# Patient Record
Sex: MALE | Race: BLACK OR AFRICAN AMERICAN | NOT HISPANIC OR LATINO | Employment: OTHER | URBAN - METROPOLITAN AREA
[De-identification: names, ages, dates, MRNs, and addresses within clinical notes are randomized per-mention and may not be internally consistent; named-entity substitution may affect disease eponyms.]

---

## 2017-01-13 ENCOUNTER — OFFICE VISIT (OUTPATIENT)
Dept: OPHTHALMOLOGY | Facility: CLINIC | Age: 82
End: 2017-01-13
Payer: MEDICARE

## 2017-01-13 DIAGNOSIS — H57.03 SMALL PUPIL: ICD-10-CM

## 2017-01-13 DIAGNOSIS — Z96.1 PSEUDOPHAKIA OF BOTH EYES: ICD-10-CM

## 2017-01-13 DIAGNOSIS — Z48.810 POSTOPERATIVE CARE FOR CATARACT: Primary | ICD-10-CM

## 2017-01-13 DIAGNOSIS — H40.023 OPEN ANGLE WITH BORDERLINE FINDINGS AND HIGH GLAUCOMA RISK IN OU: ICD-10-CM

## 2017-01-13 DIAGNOSIS — H52.31 ANISOMETROPIA: ICD-10-CM

## 2017-01-13 DIAGNOSIS — Z98.49 POSTOPERATIVE CARE FOR CATARACT: Primary | ICD-10-CM

## 2017-01-13 PROCEDURE — 99024 POSTOP FOLLOW-UP VISIT: CPT | Mod: S$GLB,,, | Performed by: OPHTHALMOLOGY

## 2017-01-13 PROCEDURE — 99999 PR PBB SHADOW E&M-EST. PATIENT-LVL II: CPT | Mod: PBBFAC,,, | Performed by: OPHTHALMOLOGY

## 2017-01-13 NOTE — PROGRESS NOTES
HPI     Post-op Evaluation    Additional comments: Pt states left eye is doing okay           Comments   S/p Complex Phaco IOL OS 11/3016    MEDS:  PA BID OS       Last edited by Paty Thomas MA on 1/13/2017  2:31 PM. (History)            Assessment /Plan     For exam results, see Encounter Report.    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 IFIS    PC IOL - od    Remote    Not sure who did it  or where it was done - if at [facility] it was pre- 2004    Ended up a bit hyperopic still - about +1.00   UNKNOWN  TYPE AND POWER OF IOL     Hyperopia / presbyopia     PLAN      S/P Complex phaco / IOL os - trypan blue and Dayan ring  - 11/30/2016    Aim for plano (other eye is a +1.00 and has PC IOL already )    PCB00 23.0    POM 1  phaco/IOL   Doing well - with mild corneal edema and mild increase in IOP   Begin Pred Acetate - decrease to q day till gone     F/U 4 weeks, AR/MR ou -  ++ anisometropia - OD is slightly hyperopic post CE yrs ago // OS is slightly myopic now   -still has a vicryl suture in corneal incision   - ?? May need slab off - old pt of MACARIO Hartley // has also seen Johnathan once     I have seen and personally examined the patient.  I agree with the findings, assessment and plan of the resident and/or fellow.     Annamarie Darling MD         grand daughter - Kathy Talavera - phone [phone] -

## 2017-01-17 ENCOUNTER — TELEPHONE (OUTPATIENT)
Dept: INTERNAL MEDICINE | Facility: CLINIC | Age: 82
End: 2017-01-17

## 2017-03-01 ENCOUNTER — HOSPITAL ENCOUNTER (EMERGENCY)
Facility: OTHER | Age: 82
Discharge: HOME | End: 2017-03-01
Attending: EMERGENCY MEDICINE
Payer: MEDICARE

## 2017-03-01 VITALS
HEART RATE: 78 BPM | DIASTOLIC BLOOD PRESSURE: 69 MMHG | TEMPERATURE: 98 F | SYSTOLIC BLOOD PRESSURE: 134 MMHG | OXYGEN SATURATION: 98 % | HEIGHT: 69 IN | RESPIRATION RATE: 18 BRPM | BODY MASS INDEX: 23.7 KG/M2 | WEIGHT: 160 LBS

## 2017-03-01 DIAGNOSIS — K59.00 CONSTIPATION, UNSPECIFIED CONSTIPATION TYPE: ICD-10-CM

## 2017-03-01 DIAGNOSIS — R33.9 URINARY RETENTION: Primary | ICD-10-CM

## 2017-03-01 LAB
BILIRUB UR QL STRIP: NEGATIVE
CLARITY UR: CLEAR
COLOR UR: YELLOW
GLUCOSE UR QL STRIP: NEGATIVE
HGB UR QL STRIP: ABNORMAL
KETONES UR QL STRIP: NEGATIVE
LEUKOCYTE ESTERASE UR QL STRIP: NEGATIVE
NITRITE UR QL STRIP: NEGATIVE
PH UR STRIP: 6 [PH] (ref 5–8)
PROT UR QL STRIP: NEGATIVE
SP GR UR STRIP: 1.01 (ref 1–1.03)
URN SPEC COLLECT METH UR: ABNORMAL
UROBILINOGEN UR STRIP-ACNC: NEGATIVE EU/DL

## 2017-03-01 PROCEDURE — 99283 EMERGENCY DEPT VISIT LOW MDM: CPT

## 2017-03-01 PROCEDURE — 81003 URINALYSIS AUTO W/O SCOPE: CPT

## 2017-03-01 RX ORDER — DOCUSATE SODIUM 100 MG/1
100 CAPSULE, LIQUID FILLED ORAL 2 TIMES DAILY
Qty: 60 CAPSULE | Refills: 0 | Status: SHIPPED | OUTPATIENT
Start: 2017-03-01 | End: 2019-04-03

## 2017-03-01 NOTE — ED PROVIDER NOTES
"Encounter Date: 3/1/2017       History     Chief Complaint   Patient presents with    Urinary Retention     Pt CO incomplete emptying of bladder, and unable to have BM Xs 2 days.     Review of patient's allergies indicates:  No Known Allergies  HPI Comments: Patient is 85 year old male with history of HTN, HLD, BPH, arthritis who presents with complaints of incomplete bladder emptying and constipation for the past 4 days. He has had trouble with these symptoms in the past. He is taking BPH medications but not taking stool softeners regularly. There is report of chronic left leg neuropathy without any changes. There is no report of trauma or injury or back pain. There is no report of bright red blood per rectum. There is no history of GI bleeding. Patient has never been catheterized in the past. He reports his last episode of urination was "sometime" this morning (exact time unable to be determined) He is currently accompanied by his granddaughter who is at bedside.     The history is provided by the patient.     Past Medical History:   Diagnosis Date    Amblyopia     refractive amblyopia left eye ()    Arthritis     BPH (benign prostatic hypertrophy)     Colon polyp     Hyperlipidemia     Hypertension      Past Surgical History:   Procedure Laterality Date    CATARACT EXTRACTION W/  INTRAOCULAR LENS IMPLANT Right n/a    done at [facility] before 2004    CATARACT EXTRACTION W/  INTRAOCULAR LENS IMPLANT Left 110/30/16    OS     TRANSURETHRAL RESECTION OF PROSTATE       Family History   Problem Relation Age of Onset    Diabetes Father     Diabetes Paternal Aunt     Cancer Brother     Heart disease Neg Hx     Amblyopia Neg Hx     Blindness Neg Hx     Cataracts Neg Hx     Glaucoma Neg Hx     Hypertension Neg Hx     Macular degeneration Neg Hx     Retinal detachment Neg Hx     Strabismus Neg Hx     Stroke Neg Hx     Thyroid disease Neg Hx      Social History   Substance Use Topics "    Smoking status: Former Smoker     Quit date: 9/26/1987    Smokeless tobacco: Never Used    Alcohol use No     Review of Systems   Constitutional: Negative for chills and fever.   HENT: Negative for sore throat and trouble swallowing.    Eyes: Negative for visual disturbance.   Respiratory: Negative for cough and shortness of breath.    Cardiovascular: Negative for chest pain.   Gastrointestinal: Positive for constipation. Negative for abdominal pain, diarrhea, nausea and vomiting.   Genitourinary: Negative for dysuria and flank pain.        Incomplete bladder emptying     Musculoskeletal: Negative for back pain, neck pain and neck stiffness.   Skin: Negative for rash.   Neurological: Negative for dizziness, syncope, weakness and headaches.   Psychiatric/Behavioral: Negative for confusion.       Physical Exam   Initial Vitals   BP Pulse Resp Temp SpO2   03/01/17 0925 03/01/17 0925 03/01/17 0925 03/01/17 0925 03/01/17 0925   161/83 83 18 98.1 °F (36.7 °C) 98 %     Physical Exam    Nursing note and vitals reviewed.  Constitutional: He appears well-developed and well-nourished. He is not diaphoretic. No distress.   Healthy appearing elderly AAM in NAD or apparent pain. He makes good eye contact and ambulates with ease.    HENT:   Head: Normocephalic and atraumatic.   Eyes: Conjunctivae and EOM are normal. Pupils are equal, round, and reactive to light. Right eye exhibits no discharge. Left eye exhibits no discharge. No scleral icterus.   Neck: Normal range of motion. Neck supple.   Cardiovascular: Normal rate, regular rhythm and normal heart sounds. Exam reveals no gallop and no friction rub.    No murmur heard.  Pulmonary/Chest: Breath sounds normal. He has no wheezes. He has no rhonchi. He has no rales.   Abdominal: Soft. Bowel sounds are normal. There is no tenderness. There is no rebound and no guarding.   Very mild suprapubic TTP without distention or overlying skin changes  No CVA TTP     Genitourinary:    Genitourinary Comments: guaiac negative stool with soft brown stool in vault, normal rectal tone no masses or bright red blood per rectum.    Musculoskeletal: Normal range of motion. He exhibits no edema or tenderness.   Lymphadenopathy:     He has no cervical adenopathy.   Neurological: He is alert and oriented to person, place, and time. He has normal strength. He displays normal reflexes. No cranial nerve deficit or sensory deficit.   Skin: Skin is warm and dry. No rash and no abscess noted. No erythema.   Psychiatric: He has a normal mood and affect. His behavior is normal. Thought content normal.         ED Course   Procedures  Labs Reviewed - No data to display     Labs Reviewed   URINALYSIS - Abnormal; Notable for the following:        Result Value    Occult Blood UA Trace (*)     All other components within normal limits          Medical Decision Making:   ED Management:  Urgent evaluation of 85 year old male who presents with complaints of constipation and sensation of incomplete bladder emptying. Patient is afebrile, non-toxic appearing and hemodynamically stable. Physical exam reveals benign abdomen, no lumbar midline bony TTP, normal cardiopulmomary auscultation and no focal neuro deficits. Patient has gauiac negative stool and has bowel movement while in the ED. He is also able to urinate without difficulty. No catheter warranted. UA reveals no evidence of UTI. Suspect symptoms are related to constipation and BPH. Granddaughter does admit that patient may not be completely compliant with BPH meds and stool softener. This is likely contributing to today's symptoms. Will discharge with instruction to follow-up with urology in 1-2 days for symptom re-check. He is amendable. Case discussed with attending who agrees with plan.       Patient able to urinate 400ccin the ED no alexis warranted. Will discharge with instruction to follow-up with Eliazar (uriologist) in 1-2 days for symptom re-check.                     ED Course     Clinical Impression:   The primary encounter diagnosis was Urinary retention. A diagnosis of Constipation, unspecified constipation type was also pertinent to this visit.          Ciara Rowe PA-C  03/01/17 7503

## 2017-03-01 NOTE — ED AVS SNAPSHOT
[facility]  [address]  [facility] [address]               Kevyn Leach   3/1/2017  9:27 AM   ED    Description:  Male : 1931   Department:  [facility]           Your Care was Coordinated By:     Provider Role From To    Steve Garduno II, MD Attending Provider 17 0935 --    Ciara Rowe PA-C Physician Assistant 17 0935 --      Reason for Visit     Urinary Retention           Diagnoses this Visit        Comments    Urinary retention    -  Primary     Constipation, unspecified constipation type           ED Disposition     None           To Do List           Follow-up Information     Follow up with Satnam Perea Jr, MD In 2 days.    Specialty:  Urology    Why:  For symptom re-check.     Contact information:    [address]  [facility] [address]  [phone]         These Medications        Disp Refills Start End    docusate sodium (COLACE) 100 MG capsule 60 capsule 0 3/1/2017     Take 1 capsule (100 mg total) by mouth 2 (two) times daily. - Oral    Pharmacy: [facility] [address] [place] [address] (N), [place] - [address] Ph #: [phone]         [facility]     [facility] Care [facility] -  Assistance  Registered nurses in the [facility] provide clinical advisement, health education, appointment booking, and other advisory services.  Call for this free service at [phone].             Medications           Message regarding Medications     Verify the changes and/or additions to your medication regime listed below are the same as discussed with your clinician today.  If any of these changes or additions are incorrect, please notify your healthcare provider.        START taking these NEW medications        Refills    docusate sodium (COLACE) 100 MG capsule 0    Sig: Take 1 capsule (100 mg total) by mouth 2 (two) times daily.    Class: Print    Route: Oral           Verify  that the below list of medications is an accurate representation of the medications you are currently taking.  If none reported, the list may be blank. If incorrect, please contact your healthcare provider. Carry this list with you in case of emergency.           Current Medications     docusate sodium (COLACE) 100 MG capsule Take 1 capsule (100 mg total) by mouth 2 (two) times daily.    finasteride (PROSCAR) 5 mg tablet Take 1 tablet (5 mg total) by mouth once daily.    hydrocortisone 2.5 % cream Apply topically 2 (two) times daily.    lovastatin (MEVACOR) 20 MG tablet TAKE ONE TABLET BY MOUTH ONCE DAILY    meloxicam (MOBIC) 15 MG tablet Take 1 tablet (15 mg total) by mouth once daily.    prednisoLONE acetate (PRED FORTE) 1 % DrpS Place 1 drop into the left eye 2 (two) times daily.     tamsulosin (FLOMAX) 0.4 mg Cp24 Take 1 capsule (0.4 mg total) by mouth once daily.    triamterene-hydrochlorothiazide 37.5-25 mg (DYAZIDE) 37.5-25 mg per capsule Take 1 capsule by mouth once daily.    verapamil (CALAN-SR) 240 MG CR tablet Take 1 tablet (240 mg total) by mouth once daily.           Clinical Reference Information           Your Vitals Were     BP                   134/69           Allergies as of 3/1/2017     No Known Allergies      Immunizations Administered on Date of Encounter - 3/1/2017     None      ED Micro, Lab, POCT     Start Ordered       Status Ordering Provider    03/01/17 0955 03/01/17 0954  Urinalysis  STAT      Final result       ED Imaging Orders     None        Discharge Instructions         Treating Constipation    Constipation is a common and often uncomfortable problem. Constipation means you have bowel movements fewer than 3 times per week, or strain to pass hard, dry stool. It can last a short time. Or it can be a problem that never seems to go away. The good news is that it can often be treated and controlled.  Eat more fiber  One of the best ways to help treat constipation is to increase your  fiber intake. You can do this either through diet or by using fiber supplements. Fiber (in whole grains, fruits, and vegetables) adds bulk and absorbs water to soften the stool. This helps the stool pass through the colon more easily. When you increase your fiber intake, do it slowly to avoid side effects such as bloating. Also increase the amount of water that you drink. Eating more of the following foods can add fiber to your diet.  · High-fiber cereals  · Whole grains, bran, and brown rice  · Vegetables such as carrots, broccoli, and greens  · Fresh fruits (especially apples, pears, and dried fruits like raisins and apricots)  · Nuts and legumes (especially beans such as lentils, kidney beans, and lima beans)  Get physically active  Exercise helps improve the working of your colon which helps ease constipation. Try to get some physical activity every day. If you havent been active for a while, talk to your healthcare provider before starting again.  Laxatives  Your healthcare provider may suggest an over-the-counter product to help ease your constipation. He or she may suggest the use of bulk-forming agents or laxatives. The use of laxatives, if used as directed, is common and safe. Follow directions carefully when using them. See your healthcare provider for new-onset constipation, or long-term constipation, to rule out other causes such as medicines or thyroid disease.  Date Last Reviewed: 7/1/2016  © 7536-7603 [employer]. [address] [place] [address], [place] [address]. All rights reserved. This information is not intended as a substitute for professional medical care. Always follow your healthcare professional's instructions.          Constipation (Adult)  Constipation means that you have bowel movements that are less frequent than usual. Stools often become very hard and difficult to pass.  Constipation is very common. At some point in life it affects almost everyone. Since everyone's bowel habits  are different, what is constipation to one person may not be to another. Your healthcare provider may do tests to diagnose constipation. It depends on what he or she finds when evaluating you.    Symptoms of constipation include:  · Abdominal pain  · Bloating  · Vomiting  · Painful bowel movements  · Itching, swelling, bleeding, or pain around the anus  Causes  Constipation can have many causes. These include:  · Diet low in fiber  · Too much dairy  · Not drinking enough liquids  · Lack of exercise or physical activity. This is especially true for older adults.  · Changes in lifestyle or daily routine, including pregnancy, aging, work, and travel  · Frequent use or misuse of laxatives  · Ignoring the urge to have a bowel movement or delaying it until later  · Medicines, such as certain prescription pain medicines, iron supplements, antacids, certain antidepressants, and calcium supplements  · Diseases like irritable bowel syndrome, bowel obstructions, stroke, diabetes, thyroid disease, Parkinson disease, hemorrhoids, and colon cancer  Complications  Potential complications of constipation can include:  · Hemorrhoids  · Rectal bleeding from hemorrhoids or anal fissures (skin tears)  · Hernias  · Dependency on laxatives  · Chronic constipation  · Fecal impaction  · Bowel obstruction or perforation  Home care  All treatment should be done after talking with your healthcare provider. This is especially true if you have another medical problems, are taking prescription medicines, or are an older adult. Treatment most often involves lifestyle changes. You may also need medicines. Your healthcare provider will tell you which will work best for you. Follow the advice below to help avoid this problem in the future.  Lifestyle changes  These lifestyle changes can help prevent constipation:  · Diet. Eat a high-fiber diet, with fresh fruit and vegetables, and reduce dairy intake, meats, and processed foods  · Fluids. It's  important to get enough fluids each day. Drink plenty of water when you eat more fiber. If you are on diet that limits the amount of fluid you can have, talk about this with your healthcare provider.  · Regular exercise. Check with your healthcare provider first.  Medications  Take any medicines as directed. Some laxatives are safe to use only every now and then. Others can be taken on a regular basis. Talk with your doctor or pharmacist if you have questions.  Prescription pain medicines can cause constipation. If you are taking this kind of medicine, ask your healthcare provider if you should also take a stool softener.  Medicines you may take to treat constipation include:  · Fiber supplements  · Stool softeners  · Laxatives  · Enemas  · Rectal suppositories  Follow-up care  Follow up with your healthcare provider if symptoms don't get better in the next few days. You may need to have more tests or see a specialist.  Call 911  Call 911 if any of these occur:  · Trouble breathing  · Stiff, rigid abdomen that is severely painful to touch  · Confusion  · Fainting or loss of consciousness  · Rapid heart rate  · Chest pain  When to seek medical advice  Call your healthcare provider right away if any of these occur:  · Fever over 100.4°F (38°C)  · Failure to resume normal bowel movements  · Pain in your abdomen or back gets worse  · Nausea or vomiting  · Swelling in your abdomen  · Blood in the stool  · Black, tarry stool  · Involuntary weight loss  · Weakness  Date Last Reviewed: 12/30/2015  © 4568-1319 [employer]. [address] [place] [address], [place] [address]. All rights reserved. This information is not intended as a substitute for professional medical care. Always follow your healthcare professional's instructions.          Discharge References/Attachments     BENIGN PROSTATIC HYPERPLASIA (ENGLISH)    URINARY RETENTION, MALE (ENGLISH)      Your Scheduled Appointments     Mar 02, 2017 11:00 AM CST   Post  OP with MD Sixto Kong - Ophthalmology ([facility] )    [address]  [facility] [address] [phone]              Smoking Cessation     If you would like to quit smoking:   You may be eligible for free services if you are a [place] resident and started smoking cigarettes before September 1, 1988.  Call the Smoking Cessation Trust (SCT) toll free at (351) 593-3491 or (647) 102-3087.   Call 1-800-QUIT-NOW if you do not meet the above criteria.             [facility] complies with applicable Federal civil rights laws and does not discriminate on the basis of race, color, national origin, age, disability, or sex.        Language Assistance Services     ATTENTION: Language assistance services are available, free of charge. Please call [phone].      ATENCIÓN: Si habla español, tiene a jerome disposición servicios gratuitos de asistencia lingüística. Llame al [phone].     CHÚ Ý: N?u b?n nói Ti?ng Vi?t, có các d?ch v? h? tr? ngôn ng? mi?n phí dành cho b?n. G?i s? [phone].

## 2017-03-01 NOTE — ED TRIAGE NOTES
Pt states difficulty emptying bladder - states difficulty starting stream and going in small amounts - pt also states no BM for the past 2 days - c/o abdominal pain

## 2017-03-01 NOTE — DISCHARGE INSTRUCTIONS
Treating Constipation    Constipation is a common and often uncomfortable problem. Constipation means you have bowel movements fewer than 3 times per week, or strain to pass hard, dry stool. It can last a short time. Or it can be a problem that never seems to go away. The good news is that it can often be treated and controlled.  Eat more fiber  One of the best ways to help treat constipation is to increase your fiber intake. You can do this either through diet or by using fiber supplements. Fiber (in whole grains, fruits, and vegetables) adds bulk and absorbs water to soften the stool. This helps the stool pass through the colon more easily. When you increase your fiber intake, do it slowly to avoid side effects such as bloating. Also increase the amount of water that you drink. Eating more of the following foods can add fiber to your diet.  · High-fiber cereals  · Whole grains, bran, and brown rice  · Vegetables such as carrots, broccoli, and greens  · Fresh fruits (especially apples, pears, and dried fruits like raisins and apricots)  · Nuts and legumes (especially beans such as lentils, kidney beans, and lima beans)  Get physically active  Exercise helps improve the working of your colon which helps ease constipation. Try to get some physical activity every day. If you havent been active for a while, talk to your healthcare provider before starting again.  Laxatives  Your healthcare provider may suggest an over-the-counter product to help ease your constipation. He or she may suggest the use of bulk-forming agents or laxatives. The use of laxatives, if used as directed, is common and safe. Follow directions carefully when using them. See your healthcare provider for new-onset constipation, or long-term constipation, to rule out other causes such as medicines or thyroid disease.  Date Last Reviewed: 7/1/2016  © 3483-4353 [employer]. [address] [place] [address], [place] [address]. All rights reserved.  This information is not intended as a substitute for professional medical care. Always follow your healthcare professional's instructions.          Constipation (Adult)  Constipation means that you have bowel movements that are less frequent than usual. Stools often become very hard and difficult to pass.  Constipation is very common. At some point in life it affects almost everyone. Since everyone's bowel habits are different, what is constipation to one person may not be to another. Your healthcare provider may do tests to diagnose constipation. It depends on what he or she finds when evaluating you.    Symptoms of constipation include:  · Abdominal pain  · Bloating  · Vomiting  · Painful bowel movements  · Itching, swelling, bleeding, or pain around the anus  Causes  Constipation can have many causes. These include:  · Diet low in fiber  · Too much dairy  · Not drinking enough liquids  · Lack of exercise or physical activity. This is especially true for older adults.  · Changes in lifestyle or daily routine, including pregnancy, aging, work, and travel  · Frequent use or misuse of laxatives  · Ignoring the urge to have a bowel movement or delaying it until later  · Medicines, such as certain prescription pain medicines, iron supplements, antacids, certain antidepressants, and calcium supplements  · Diseases like irritable bowel syndrome, bowel obstructions, stroke, diabetes, thyroid disease, Parkinson disease, hemorrhoids, and colon cancer  Complications  Potential complications of constipation can include:  · Hemorrhoids  · Rectal bleeding from hemorrhoids or anal fissures (skin tears)  · Hernias  · Dependency on laxatives  · Chronic constipation  · Fecal impaction  · Bowel obstruction or perforation  Home care  All treatment should be done after talking with your healthcare provider. This is especially true if you have another medical problems, are taking prescription medicines, or are an older adult. Treatment  most often involves lifestyle changes. You may also need medicines. Your healthcare provider will tell you which will work best for you. Follow the advice below to help avoid this problem in the future.  Lifestyle changes  These lifestyle changes can help prevent constipation:  · Diet. Eat a high-fiber diet, with fresh fruit and vegetables, and reduce dairy intake, meats, and processed foods  · Fluids. It's important to get enough fluids each day. Drink plenty of water when you eat more fiber. If you are on diet that limits the amount of fluid you can have, talk about this with your healthcare provider.  · Regular exercise. Check with your healthcare provider first.  Medications  Take any medicines as directed. Some laxatives are safe to use only every now and then. Others can be taken on a regular basis. Talk with your doctor or pharmacist if you have questions.  Prescription pain medicines can cause constipation. If you are taking this kind of medicine, ask your healthcare provider if you should also take a stool softener.  Medicines you may take to treat constipation include:  · Fiber supplements  · Stool softeners  · Laxatives  · Enemas  · Rectal suppositories  Follow-up care  Follow up with your healthcare provider if symptoms don't get better in the next few days. You may need to have more tests or see a specialist.  Call 911  Call 911 if any of these occur:  · Trouble breathing  · Stiff, rigid abdomen that is severely painful to touch  · Confusion  · Fainting or loss of consciousness  · Rapid heart rate  · Chest pain  When to seek medical advice  Call your healthcare provider right away if any of these occur:  · Fever over 100.4°F (38°C)  · Failure to resume normal bowel movements  · Pain in your abdomen or back gets worse  · Nausea or vomiting  · Swelling in your abdomen  · Blood in the stool  · Black, tarry stool  · Involuntary weight loss  · Weakness  Date Last Reviewed: 12/30/2015  © 6032-9070 [employer]. [address] [place] [address], [place] [address]. All rights reserved. This information is not intended as a substitute for professional medical care. Always follow your healthcare professional's instructions.

## 2017-03-15 DIAGNOSIS — E78.5 HYPERLIPIDEMIA: ICD-10-CM

## 2017-03-15 RX ORDER — LOVASTATIN 20 MG/1
TABLET ORAL
Qty: 30 TABLET | Refills: 0 | Status: SHIPPED | OUTPATIENT
Start: 2017-03-15 | End: 2017-04-13 | Stop reason: SDUPTHER

## 2017-03-21 RX ORDER — FINASTERIDE 5 MG/1
TABLET, FILM COATED ORAL
Qty: 90 TABLET | Refills: 3 | Status: SHIPPED | OUTPATIENT
Start: 2017-03-21 | End: 2017-10-31 | Stop reason: SDUPTHER

## 2017-04-13 DIAGNOSIS — E78.5 HYPERLIPIDEMIA: ICD-10-CM

## 2017-04-18 RX ORDER — LOVASTATIN 20 MG/1
TABLET ORAL
Qty: 30 TABLET | Refills: 12 | Status: SHIPPED | OUTPATIENT
Start: 2017-04-18 | End: 2018-04-30 | Stop reason: SDUPTHER

## 2017-04-24 DIAGNOSIS — N40.0 BENIGN NON-NODULAR PROSTATIC HYPERPLASIA WITHOUT LOWER URINARY TRACT SYMPTOMS: ICD-10-CM

## 2017-04-24 DIAGNOSIS — N40.0 BPH (BENIGN PROSTATIC HYPERTROPHY): ICD-10-CM

## 2017-04-25 RX ORDER — TAMSULOSIN HYDROCHLORIDE 0.4 MG/1
CAPSULE ORAL
Qty: 90 CAPSULE | Refills: 12 | Status: SHIPPED | OUTPATIENT
Start: 2017-04-25 | End: 2018-04-30 | Stop reason: SDUPTHER

## 2017-04-28 DIAGNOSIS — I10 ESSENTIAL HYPERTENSION: ICD-10-CM

## 2017-05-01 RX ORDER — VERAPAMIL HYDROCHLORIDE 240 MG/1
TABLET, FILM COATED, EXTENDED RELEASE ORAL
Qty: 90 TABLET | Refills: 12 | Status: SHIPPED | OUTPATIENT
Start: 2017-05-01 | End: 2018-07-26 | Stop reason: SDUPTHER

## 2017-05-18 DIAGNOSIS — I10 ESSENTIAL HYPERTENSION: ICD-10-CM

## 2017-05-18 RX ORDER — TRIAMTERENE AND HYDROCHLOROTHIAZIDE 37.5; 25 MG/1; MG/1
CAPSULE ORAL
Qty: 90 CAPSULE | Refills: 3 | Status: SHIPPED | OUTPATIENT
Start: 2017-05-18 | End: 2018-04-30 | Stop reason: SDUPTHER

## 2017-05-23 ENCOUNTER — HOSPITAL ENCOUNTER (EMERGENCY)
Facility: HOSPITAL | Age: 82
Discharge: HOME | End: 2017-05-23
Attending: FAMILY MEDICINE
Payer: MEDICARE

## 2017-05-23 ENCOUNTER — TELEPHONE (OUTPATIENT)
Dept: INTERNAL MEDICINE | Facility: CLINIC | Age: 82
End: 2017-05-23

## 2017-05-23 VITALS
WEIGHT: 169 LBS | OXYGEN SATURATION: 100 % | HEIGHT: 69 IN | HEART RATE: 77 BPM | RESPIRATION RATE: 16 BRPM | TEMPERATURE: 99 F | DIASTOLIC BLOOD PRESSURE: 88 MMHG | SYSTOLIC BLOOD PRESSURE: 174 MMHG | BODY MASS INDEX: 25.03 KG/M2

## 2017-05-23 DIAGNOSIS — R41.82 ALTERED MENTAL STATE: ICD-10-CM

## 2017-05-23 DIAGNOSIS — K59.00 CONSTIPATION, ACUTE: Primary | ICD-10-CM

## 2017-05-23 LAB
ALBUMIN SERPL BCP-MCNC: 3.9 G/DL
ALP SERPL-CCNC: 49 U/L
ALT SERPL W/O P-5'-P-CCNC: 11 U/L
ANION GAP SERPL CALC-SCNC: 11 MMOL/L
AST SERPL-CCNC: 19 U/L
BASOPHILS # BLD AUTO: 0.01 K/UL
BASOPHILS NFR BLD: 0.2 %
BILIRUB SERPL-MCNC: 0.5 MG/DL
BILIRUB UR QL STRIP: NEGATIVE
BUN SERPL-MCNC: 14 MG/DL
CALCIUM SERPL-MCNC: 10.2 MG/DL
CHLORIDE SERPL-SCNC: 102 MMOL/L
CLARITY UR REFRACT.AUTO: CLEAR
CO2 SERPL-SCNC: 26 MMOL/L
COLOR UR AUTO: ABNORMAL
CREAT SERPL-MCNC: 0.9 MG/DL
DIFFERENTIAL METHOD: NORMAL
EOSINOPHIL # BLD AUTO: 0.1 K/UL
EOSINOPHIL NFR BLD: 1.2 %
ERYTHROCYTE [DISTWIDTH] IN BLOOD BY AUTOMATED COUNT: 13.9 %
EST. GFR  (AFRICAN AMERICAN): >60 ML/MIN/1.73 M^2
EST. GFR  (NON AFRICAN AMERICAN): >60 ML/MIN/1.73 M^2
GLUCOSE SERPL-MCNC: 78 MG/DL
GLUCOSE UR QL STRIP: NEGATIVE
HCT VFR BLD AUTO: 45.6 %
HGB BLD-MCNC: 14.7 G/DL
HGB UR QL STRIP: NEGATIVE
KETONES UR QL STRIP: ABNORMAL
LEUKOCYTE ESTERASE UR QL STRIP: NEGATIVE
LYMPHOCYTES # BLD AUTO: 1.5 K/UL
LYMPHOCYTES NFR BLD: 25.9 %
MCH RBC QN AUTO: 28.4 PG
MCHC RBC AUTO-ENTMCNC: 32.2 %
MCV RBC AUTO: 88 FL
MONOCYTES # BLD AUTO: 0.7 K/UL
MONOCYTES NFR BLD: 11.9 %
NEUTROPHILS # BLD AUTO: 3.6 K/UL
NEUTROPHILS NFR BLD: 60.6 %
NITRITE UR QL STRIP: NEGATIVE
PH UR STRIP: 7 [PH] (ref 5–8)
PLATELET # BLD AUTO: 208 K/UL
PMV BLD AUTO: 9.9 FL
POTASSIUM SERPL-SCNC: 3.4 MMOL/L
PROT SERPL-MCNC: 8.6 G/DL
PROT UR QL STRIP: NEGATIVE
RBC # BLD AUTO: 5.17 M/UL
SODIUM SERPL-SCNC: 139 MMOL/L
SP GR UR STRIP: 1.01 (ref 1–1.03)
TSH SERPL DL<=0.005 MIU/L-ACNC: 0.7 UIU/ML
URN SPEC COLLECT METH UR: ABNORMAL
UROBILINOGEN UR STRIP-ACNC: NEGATIVE EU/DL
WBC # BLD AUTO: 5.88 K/UL

## 2017-05-23 PROCEDURE — 99284 EMERGENCY DEPT VISIT MOD MDM: CPT

## 2017-05-23 PROCEDURE — 25000003 PHARM REV CODE 250: Performed by: EMERGENCY MEDICINE

## 2017-05-23 PROCEDURE — 81003 URINALYSIS AUTO W/O SCOPE: CPT

## 2017-05-23 PROCEDURE — 85025 COMPLETE CBC W/AUTO DIFF WBC: CPT

## 2017-05-23 PROCEDURE — 99285 EMERGENCY DEPT VISIT HI MDM: CPT | Mod: ,,, | Performed by: EMERGENCY MEDICINE

## 2017-05-23 PROCEDURE — 80053 COMPREHEN METABOLIC PANEL: CPT

## 2017-05-23 PROCEDURE — 84443 ASSAY THYROID STIM HORMONE: CPT

## 2017-05-23 RX ORDER — POTASSIUM CHLORIDE 20 MEQ/15ML
60 SOLUTION ORAL
Status: COMPLETED | OUTPATIENT
Start: 2017-05-23 | End: 2017-05-23

## 2017-05-23 RX ORDER — POLYETHYLENE GLYCOL 3350 17 G/17G
17 POWDER, FOR SOLUTION ORAL DAILY
Qty: 595 G | Refills: 0 | Status: SHIPPED | OUTPATIENT
Start: 2017-05-23 | End: 2017-05-23

## 2017-05-23 RX ORDER — POLYETHYLENE GLYCOL 3350 17 G/17G
17 POWDER, FOR SOLUTION ORAL DAILY
Qty: 595 G | Refills: 0 | Status: SHIPPED | OUTPATIENT
Start: 2017-05-23 | End: 2022-04-25

## 2017-05-23 RX ADMIN — POTASSIUM CHLORIDE 60 MEQ: 20 SOLUTION ORAL at 05:05

## 2017-05-23 NOTE — ED TRIAGE NOTES
"Pt complains of burning in his legs and " they feel like they are going out "  Pt also complains of pain to left side of face  Rates his pain a 5/10   Pt states he has been losing weight over the past 3 to 4 weeks but does not know how much because he has not weighed himself  "

## 2017-05-23 NOTE — ED NOTES
Pt identifiers Kevyn Leach were checked and are correct  LOC: The patient is awake, alert, aware of environment with an appropriate affect. Oriented x3, speaking appropriately  APPEARANCE: Pt resting comfortably, in no acute distress, pt is clean and well groomed, clothing properly fastened  SKIN: Skin warm, dry and intact, normal skin turgor, moist mucus membranes  RESPIRATORY: Airway is open and patent, respirations are spontaneous, even and unlabored, normal effort and rate Breath sounds clear aly to all lung fields on auscultation  CARDIAC: Radial pulses strong and irregular , 1 + peripheral  edema noted, capillary refill < 3 seconds, bilateral radial pulses 2+  ABDOMEN: Soft, nontender, nondistended. Bowel sounds present to all four quad of abd on auscultation   NEUROLOGIC:, facial expression is symmetrical, patient moving all extremities spontaneously, normal sensation in all extremities when touched with a finger.  Follows all commands appropriately  MUSCULOSKELETAL: No obvious deformities.

## 2017-05-23 NOTE — ED PROVIDER NOTES
Encounter Date: 5/23/2017    SCRIBE #1 NOTE: I, Lorin Nina, am scribing for, and in the presence of,  Dr. Gresham . I have scribed the following portions of the note - the Resident attestation.       History     Chief Complaint   Patient presents with    Weakness     bilateral lower extremity, patient also states that his bowel wont move; however he had a bowel movement on yesterday      Review of patient's allergies indicates:  No Known Allergies    HPI   85M with bilateral lower extremity weakness. He presented to the intake department with c/o vague symptoms worrisome for AMS.  He noticed problems driving especially with backing up yesterday which is unusual for him. Bilateral LE are weaker than usual.  He has trouble standing and started using a makeshift cane in the past week - he reports this history to me as well. However, he denies rectal pain to me which he endorsed to the intake staff. Able to tolerate PO.        Past Medical History:   Diagnosis Date    Amblyopia     refractive amblyopia left eye ()    Arthritis     BPH (benign prostatic hypertrophy)     Colon polyp     Hyperlipidemia     Hypertension      Past Surgical History:   Procedure Laterality Date    CATARACT EXTRACTION W/  INTRAOCULAR LENS IMPLANT Right n/a    done at [facility] before 2004    CATARACT EXTRACTION W/  INTRAOCULAR LENS IMPLANT Left 110/30/16    OS     TRANSURETHRAL RESECTION OF PROSTATE       Family History   Problem Relation Age of Onset    Diabetes Father     Diabetes Paternal Aunt     Cancer Brother     Heart disease Neg Hx     Amblyopia Neg Hx     Blindness Neg Hx     Cataracts Neg Hx     Glaucoma Neg Hx     Hypertension Neg Hx     Macular degeneration Neg Hx     Retinal detachment Neg Hx     Strabismus Neg Hx     Stroke Neg Hx     Thyroid disease Neg Hx      Social History   Substance Use Topics    Smoking status: Former Smoker     Quit date: 9/26/1987    Smokeless tobacco: Never  Used    Alcohol use No     Review of Systems   Constitutional: Negative for fatigue and fever.   HENT: Negative for sneezing and sore throat.    Respiratory: Negative for cough and shortness of breath.    Cardiovascular: Negative for chest pain and palpitations.   Gastrointestinal: Positive for constipation. Negative for diarrhea and nausea.   Genitourinary: Negative for dysuria and flank pain.   Musculoskeletal: Negative for arthralgias and back pain.   Skin: Negative for pallor and rash.   Neurological: Positive for weakness. Negative for tremors.   Hematological: Does not bruise/bleed easily.   Psychiatric/Behavioral: Negative for agitation and behavioral problems.       Physical Exam     Initial Vitals [05/23/17 1201]   BP Pulse Resp Temp SpO2   (!) 170/82 82 18 99.1 °F (37.3 °C) 98 %     Physical Exam    Nursing note and vitals reviewed.  Constitutional: He appears well-developed and well-nourished. He is not diaphoretic. No distress.   Pleasant AA male with wife at bedside. Alert and oriented x 3.   HENT:   Head: Normocephalic and atraumatic.   Nose: Nose normal.   Mouth/Throat: Oropharynx is clear and moist. No oropharyngeal exudate.   Eyes: Conjunctivae and EOM are normal. Pupils are equal, round, and reactive to light. Right eye exhibits no discharge. Left eye exhibits no discharge. No scleral icterus.   Neck: Normal range of motion. Neck supple. No thyromegaly present. No tracheal deviation present. No JVD present.   Cardiovascular: Normal rate, regular rhythm, normal heart sounds and intact distal pulses. Exam reveals no gallop and no friction rub.    No murmur heard.  Pulmonary/Chest: Breath sounds normal. No stridor. No respiratory distress. He has no wheezes. He has no rhonchi. He has no rales. He exhibits no tenderness.   Abdominal: Soft. Bowel sounds are normal. He exhibits no distension and no mass. There is no tenderness. There is no rebound and no guarding.   Musculoskeletal: Normal range of  motion. He exhibits no edema or tenderness.   4/5 motor strength in bilateral lower extremities.    Lymphadenopathy:     He has no cervical adenopathy.   Neurological: He is alert and oriented to person, place, and time. He has normal strength. No cranial nerve deficit or sensory deficit.   No dysdiadokinesis or dysmetria. Gait non-antalgic however slow and steady with assistance of a stick.cane.    Skin: Skin is warm and dry. No rash and no abscess noted. No erythema. No pallor.   Psychiatric: He has a normal mood and affect. Thought content normal.            ED Course   Procedures  Labs Reviewed   COMPREHENSIVE METABOLIC PANEL - Abnormal; Notable for the following:        Result Value    Potassium 3.4 (*)     Total Protein 8.6 (*)     Alkaline Phosphatase 49 (*)     All other components within normal limits   URINALYSIS - Abnormal; Notable for the following:     Ketones, UA Trace (*)     All other components within normal limits   CBC W/ AUTO DIFFERENTIAL   TSH          X-Rays:   Independently Interpreted Readings:   Head CT: Age-related involutional changes noted without evidence of intracranial hemorrhage or mass effect.   Other Readings:  XR Abdomen - No jessica evidence of intestinal obstruction noted. Stool noted in the rectum.       Imaging Results          CT Head Without Contrast (Final result)  Result time 05/23/17 17:30:37    Final result by Michael Pal MD (05/23/17 17:30:37)                 Impression:       Noncontrast CT demonstrates no evidence of acute intracranial pathology.    Changes of chronic small vessel ischemic disease and cerebral volume loss.  ______________________________________     Electronically signed by resident: DEREK DUARTE  Date:     05/23/17  Time:    17:20            As the supervising and teaching physician, I personally reviewed the images and resident's interpretation and I agree with the findings.          Electronically signed by: MICHAEL PAL MD  Date:      05/23/17  Time:    17:30              Narrative:    CT head without contrast    CLINICAL INDICATION: Mild bilateral lower extremity weakness of gradual onset over the course of weeks    TECHNIQUE: Axial CT images obtained throughout the region of the head without the use of intravenous contrast. Axial, sagittal and coronal reconstructions were performed.    COMPARISON: No available dedicated priors, Limited comparison made to maxillofacial CT 5/2004    FINDINGS:  There is generalized cerebral volume loss with compensatory enlargement of the ventricular system and widening of sulci. There is patchy periventricular white matter hypoattenuation.    The brain appears within normal limits. No parenchymal mass, hemorrhage, edema or major vascular distribution infarct.    No extra-axial blood or fluid collections.    The cranium appears intact. Mastoid air cells and paranasal sinuses are essentially clear.  There are postoperative changes in the orbits.                             X-Ray Abdomen Flat And Erect (Final result)  Result time 05/23/17 16:31:00    Final result by Ector Jeffery MD (05/23/17 16:31:00)                 Impression:     As above.      Electronically signed by: ECTOR JEFFERY MD  Date:     05/23/17  Time:    16:31              Narrative:    AP abdomen x-ray, 3 view(s)    Comparison: Not available.    Findings: Copious stool noted within the rectum. Bowel gas pattern is nonobstructive.  No evidence for pneumoperitoneum.  Degenerative changes are seen in the lower lumbar spine.                              Medical Decision Making:   History:   Old Medical Records: I decided to obtain old medical records.  Independently Interpreted Test(s):   I have ordered and independently interpreted X-rays - see prior notes.  Clinical Tests:   Lab Tests: Ordered and Reviewed  Radiological Study: Ordered and Reviewed       APC / Resident Notes:   MDM: 85M with bilateral leg pain. Appeared confused at intake  Initial  ddx included but was not limited: ICH/SAH, metabolic encephalopathy, hepatic encephalopathy, dysrrhythmia, constipation, SBO  CBC wnl  CMP wnl  UA negative for nitrites, WBC, or RBC  CT head negative for acute pathology; age related microvascular changes and volume loss.  XR Abdomen reveals large volume of stool, but no obstruction.   Will discharge to home with rx for miralax, PCP f/u and return precautions.   Jagdish Lyles MD  PGY-1 [employer] EM           Scribe Attestation:   Scribe #1: I performed the above scribed service and the documentation accurately describes the services I performed. I attest to the accuracy of the note.    Attending Attestation:   Physician Attestation Statement for Resident:  As the supervising MD   Physician Attestation Statement: I have personally seen and examined this patient.   I agree with the above history. -: 84y/o male with a history of recurrent constipation presents for evaluation of generalized weakness and constipation.    As the supervising MD I agree with the above PE.    As the supervising MD I agree with the above treatment, course, plan, and disposition.  I have reviewed and agree with the residents interpretation of the following: lab data, x-rays and CT scans.          Physician Attestation for Scribe:  Physician Attestation Statement for Scribe #1: I, Dr. Gresham, reviewed documentation, as scribed by Lorin Nina in my presence, and it is both accurate and complete.                 ED Course     Clinical Impression:   The primary encounter diagnosis was Constipation, acute. A diagnosis of Altered mental state was also pertinent to this visit.    Disposition:   Disposition: Discharged  Condition: Stable       Jagdish Lyles MD  Resident  05/25/17 1702       Michael Gresham MD  06/02/17 6339

## 2017-05-23 NOTE — PROVIDER PROGRESS NOTES - EMERGENCY DEPT.
Encounter Date: 5/23/2017    ED Physician Progress Notes         SCRIBE NOTE: I, Jeanne Maldonado, am scribing for, and in the presence of,  Dr. Prasad.  Physician Statement: I, Dr. Prasad, personally performed the services described in this documentation as scribed by Jeanne Maldonado in my presence, and it is both accurate and complete.    I initially evaluated this patient and ordered workup while in intake.  The patient will receive a full evaluation in an ED pod when space is available.  All results from tests ordered in intake will not be followed by the intake team, including myself. All results will be followed by the ED Pod team.    Time seen by provider: 3:09 PM    This is a 85 y.o. male who presents with complaint of vague symptoms that are worrisome for AMS.  He noticed problems driving especially with backing up yesterday which is unusual for him.  Bilateral LE are weaker than usual.  He has trouble standing and started  Using a cane in the past week.  The patient denies significant LE pain but indicates pain in his rectal area that feels like constipation but he had a BM yesterday.  He doesn't appear toxic but I am concerned that he may have suffered a CVA given his vague symptoms. The patient has palpable dorsalis pedis pulses bilaterally and no other evidence of acute ischemia. I will obtain appropriate blood work, UA, and head CT and refer to the main ED pods for final disposition.

## 2017-05-23 NOTE — TELEPHONE ENCOUNTER
----- Message from Liliya Bravo sent at 5/23/2017 11:02 AM CDT -----  Contact: dazahra/Kathy/[phone]  Pt's granddaughter said that she is calling in regards needing to speak with the nurse she stated that pt thinks that something is wrong, but the family said that they don't see anything wrong with pt please call her asap. Please call and advise            Thank you

## 2017-05-23 NOTE — TELEPHONE ENCOUNTER
Family states they think he is fine but always ask to go to the ER they feel for attention. Feels weak, no dyspnea no chest pain no other sx's he constantly does this. She asked for someone to see him today. appt booked

## 2017-05-24 ENCOUNTER — TELEPHONE (OUTPATIENT)
Dept: INTERNAL MEDICINE | Facility: CLINIC | Age: 82
End: 2017-05-24

## 2017-05-24 NOTE — TELEPHONE ENCOUNTER
Another visit to ER for constipation/ they said he needs fu this week with you granddaughter states he was told to stop all medications until he speaks with you,

## 2017-05-24 NOTE — TELEPHONE ENCOUNTER
----- Message from Antonietta Duarte sent at 5/24/2017 10:21 AM CDT -----  Contact: Pt's granddaughter  Pt's granddaughter is calling to speak with nurse in regards to Pt's care.  Pt was seen in ED yesterday 5/23/17 and granddaughter would like nurse to speak with pt based diagnosis from discharge papers.    Pt's granddaughter can be reached at [phone].  Thank you

## 2017-05-25 ENCOUNTER — OFFICE VISIT (OUTPATIENT)
Dept: INTERNAL MEDICINE | Facility: CLINIC | Age: 82
End: 2017-05-25
Payer: MEDICARE

## 2017-05-25 ENCOUNTER — TELEPHONE (OUTPATIENT)
Dept: INTERNAL MEDICINE | Facility: CLINIC | Age: 82
End: 2017-05-25

## 2017-05-25 VITALS
WEIGHT: 166.88 LBS | SYSTOLIC BLOOD PRESSURE: 120 MMHG | BODY MASS INDEX: 24.72 KG/M2 | DIASTOLIC BLOOD PRESSURE: 78 MMHG | HEIGHT: 69 IN | HEART RATE: 60 BPM

## 2017-05-25 DIAGNOSIS — K59.00 CONSTIPATION, UNSPECIFIED CONSTIPATION TYPE: ICD-10-CM

## 2017-05-25 DIAGNOSIS — R26.81 GAIT INSTABILITY: ICD-10-CM

## 2017-05-25 DIAGNOSIS — I10 ESSENTIAL HYPERTENSION: Primary | ICD-10-CM

## 2017-05-25 PROCEDURE — 99999 PR PBB SHADOW E&M-EST. PATIENT-LVL III: CPT | Mod: PBBFAC,,, | Performed by: INTERNAL MEDICINE

## 2017-05-25 PROCEDURE — 99499 UNLISTED E&M SERVICE: CPT | Mod: S$GLB,,, | Performed by: INTERNAL MEDICINE

## 2017-05-25 NOTE — TELEPHONE ENCOUNTER
----- Message from Dahlia Bobo sent at 5/24/2017 11:02 AM CDT -----  Good Morning,    The patient's wife is calling to see if the patient needs a f/u appointment after his ED visit on 5/23/17.  Please call with any information.    Thank you,    Dahlia Bobo  [facility]

## 2017-05-25 NOTE — PROGRESS NOTES
Subjective:       Patient ID: Kevyn Leach is a 85 y.o. male.    Chief Complaint: Follow-up (constipation) and Leg Pain (bilaterial)    Feeling better today-  BP controlled-  Back on reg meds.  Will cancel this appt-  Issues have self-corrected      Leg Pain        Review of Systems    Objective:      Physical Exam    Assessment:       No diagnosis found.    Plan:       There are no diagnoses linked to this encounter.    No Follow-up on file.

## 2017-05-31 RX ORDER — MELOXICAM 15 MG/1
TABLET ORAL
Qty: 30 TABLET | Refills: 12 | Status: SHIPPED | OUTPATIENT
Start: 2017-05-31 | End: 2017-10-03

## 2017-07-31 ENCOUNTER — OFFICE VISIT (OUTPATIENT)
Dept: OPHTHALMOLOGY | Facility: CLINIC | Age: 82
End: 2017-07-31
Payer: MEDICARE

## 2017-07-31 ENCOUNTER — OFFICE VISIT (OUTPATIENT)
Dept: INTERNAL MEDICINE | Facility: CLINIC | Age: 82
End: 2017-07-31
Payer: MEDICARE

## 2017-07-31 VITALS
SYSTOLIC BLOOD PRESSURE: 140 MMHG | HEART RATE: 78 BPM | DIASTOLIC BLOOD PRESSURE: 80 MMHG | WEIGHT: 161.81 LBS | BODY MASS INDEX: 23.97 KG/M2 | HEIGHT: 69 IN

## 2017-07-31 DIAGNOSIS — H57.03 SMALL PUPIL: ICD-10-CM

## 2017-07-31 DIAGNOSIS — Z96.1 PSEUDOPHAKIA OF BOTH EYES: ICD-10-CM

## 2017-07-31 DIAGNOSIS — I70.0 AORTIC ATHEROSCLEROSIS: ICD-10-CM

## 2017-07-31 DIAGNOSIS — N40.1 BENIGN PROSTATIC HYPERPLASIA WITH LOWER URINARY TRACT SYMPTOMS, SYMPTOM DETAILS UNSPECIFIED: ICD-10-CM

## 2017-07-31 DIAGNOSIS — M48.061 LUMBAR STENOSIS: ICD-10-CM

## 2017-07-31 DIAGNOSIS — E78.5 HYPERLIPIDEMIA, UNSPECIFIED HYPERLIPIDEMIA TYPE: ICD-10-CM

## 2017-07-31 DIAGNOSIS — Z00.00 ENCOUNTER FOR PREVENTIVE HEALTH EXAMINATION: Primary | ICD-10-CM

## 2017-07-31 DIAGNOSIS — H40.013 OPEN ANGLE WITH BORDERLINE FINDINGS AND LOW GLAUCOMA RISK IN BOTH EYES: Primary | ICD-10-CM

## 2017-07-31 DIAGNOSIS — M47.812 CERVICAL SPINE ARTHRITIS: ICD-10-CM

## 2017-07-31 DIAGNOSIS — I10 ESSENTIAL HYPERTENSION: ICD-10-CM

## 2017-07-31 PROCEDURE — G0439 PPPS, SUBSEQ VISIT: HCPCS | Mod: S$GLB,,, | Performed by: NURSE PRACTITIONER

## 2017-07-31 PROCEDURE — 92012 INTRM OPH EXAM EST PATIENT: CPT | Mod: S$GLB,,, | Performed by: OPHTHALMOLOGY

## 2017-07-31 PROCEDURE — 99499 UNLISTED E&M SERVICE: CPT | Mod: S$GLB,,, | Performed by: NURSE PRACTITIONER

## 2017-07-31 PROCEDURE — 99999 PR PBB SHADOW E&M-EST. PATIENT-LVL IV: CPT | Mod: PBBFAC,,, | Performed by: NURSE PRACTITIONER

## 2017-07-31 PROCEDURE — 99999 PR PBB SHADOW E&M-EST. PATIENT-LVL II: CPT | Mod: PBBFAC,,, | Performed by: OPHTHALMOLOGY

## 2017-07-31 NOTE — PROGRESS NOTES
"Kevyn Leach presented for a  Medicare AWV and comprehensive Health Risk Assessment today. The following components were reviewed and updated:    · Medical history  · Family History  · Social history  · Allergies and Current Medications  · Health Risk Assessment  · Health Maintenance  · Care Team     ** See Completed Assessments for Annual Wellness Visit within the encounter summary.**       The following assessments were completed:  · Living Situation  · CAGE  · Depression Screening  · Timed Get Up and Go  · Whisper Test  · Cognitive Function Screening  ·   ·   ·   · Nutrition Screening  · ADL Screening  · PAQ Screening    Vitals:    07/31/17 1124   BP: (!) 140/80   BP Location: Left arm   Pulse: 78   Weight: 73.4 kg (161 lb 13.1 oz)   Height: 5' 9" (1.753 m)     Body mass index is 23.9 kg/m².  Physical Exam   Constitutional: He is oriented to person, place, and time.   Thin, frail   HENT:   Head: Normocephalic.   Cardiovascular: Normal rate and regular rhythm.    Pulmonary/Chest: Effort normal and breath sounds normal.   Abdominal: Soft. Bowel sounds are normal.   Musculoskeletal: He exhibits no edema.   Gait antalgic, ambulates with cane   Neurological: He is alert and oriented to person, place, and time.   Skin: Skin is warm and dry.   Psychiatric: He has a normal mood and affect.   Nursing note and vitals reviewed.        Diagnoses and health risks identified today and associated recommendations/orders:    1. Encounter for preventive health examination  Declined TDAP, zoster vaccines.  Here for Health Risk Assessment. Follow up in one year.    2. Essential hypertension  Chronic, stable on current medications. Followed by PCP.    3. Aortic atherosclerosis  Chronic, stable on current medications. Noted CT of abdomen 6/25/14.  Followed by PCP.    4. Benign prostatic hyperplasia with lower urinary tract symptoms, symptom details unspecified  Chronic, stable on current medications. Followed by PCP.    5. " Hyperlipidemia, unspecified hyperlipidemia type  Chronic, stable on current medication. Followed by PCP.    6. Lumbar stenosis  Chronic, stable. Followed by PCP.    7. Cervical spine arthritis  Chronic, reporting right arm/shoulder pain. Noted MRI cervical spine 6/04/16. Followed by PCP.    8. Abnormal cognitive assessment.  Mini-Cog score 2/7. 1/3 word recall. 1/4 clock drawing. Reporting no memory problems, but does not know which medications he taking. Grand daughter reports concerns about his memory. She reports that she set up his medications, but he is not taking them as ordered and is resistant to using a pill planner. He drives occasionally. Grand daughter is seeking POA to assist with management.      Provided Kevyn with a 5-10 year written screening schedule and personal prevention plan. Recommendations were developed using the USPSTF age appropriate recommendations. Education, counseling, and referrals were provided as needed. After Visit Summary printed and given to patient which includes a list of additional screenings\tests needed.    Return in 4 weeks (on 8/29/2017).    Lory Mayorga NP

## 2017-07-31 NOTE — PATIENT INSTRUCTIONS
Counseling and Referral of Other Preventative  (Italic type indicates deductible and co-insurance are waived)    Patient Name: Kevyn Leach  Today's Date: 7/31/2017      SERVICE LIMITATIONS RECOMMENDATION    Vaccines    · Pneumococcal (once after 65)    · Influenza (annually)    · Hepatitis B (if medium/high risk)    · Prevnar 13      Hepatitis B medium/high risk factors:       - End-stage renal disease       - Hemophiliacs who received Factor VII or         IX concentrates       - Clients of institutions for the mentally             retarded       - Persons who live in the same house as          a HepB carrier       - Homosexual men       - Illicit injectable drug abusers     Pneumococcal: Done, no repeat necessary     Influenza: N/A     Hepatitis B: N/A     Prevnar 13: Done, no repeat necessary    Prostate cancer screening (annually to age 75)     Prostate specific antigen (PSA) Shared decision making with Provider. Sometimes a co-pay may be required if the patient decides to have this test. The USPSTF no longer recommends prostate cancer screening routinely in medicine: per PCP    Colorectal cancer screening (to age 75)    · Fecal occult blood test (annual)  · Flexible sigmoidoscopy (5y)  · Screening colonoscopy (10y)  · Barium enema   N/A    Diabetes self-management training (no USPSTF recommendations)  Requires referral by treating physician for patient with diabetes or renal disease. 10 hours of initial DSMT sessions of no less than 30 minutes each in a continuous 12-month period. 2 hours of follow-up DSMT in subsequent years.  N/A    Glaucoma screening (no USPSTF recommendation)  Diabetes mellitus, family history   , age 50 or over    American, age 65 or over  Done this year, repeat every year    Medical nutrition therapy for diabetes or renal disease (no recommended schedule)  Requires referral by treating physician for patient with diabetes or renal disease or kidney transplant  within the past 3 years.  Can be provided in same year as diabetes self-management training (DSMT), and CMS recommends medical nutrition therapy take place after DSMT. Up to 3 hours for initial year and 2 hours in subsequent years.  N/A    Cardiovascular screening blood tests (every 5 years)  · Fasting lipid panel  Order as a panel if possible  Last done 4/2015, recommend to repeat every 5  years    Diabetes screening tests (at least every 3 years, Medicare covers annually or at 6-month intervals for prediabetic patients)  · Fasting blood sugar (FBS) or glucose tolerance test (GTT)  Patient must be diagnosed with one of the following:       - Hypertension       - Dyslipidemia       - Obesity (BMI 30kg/m2)       - Previous elevated impaired FBS or GTT       ... or any two of the following:       - Overweight (BMI 25 but <30)       - Family history of diabetes       - Age 65 or older       - History of gestational diabetes or birth of baby weighing more than 9 pounds  Done this year, repeat every year    Abdominal aortic aneurysm screening (once)  · Sonogram   Limited to patients who meet one of the following criteria:       - Men who are 65-75 years old and have smoked more than 100 cigarette in their lifetime       - Anyone with a family history of abdominal aortic aneurysm       - Anyone recommended for screening by the USPSTF  N/A    HIV screening (annually for increased risk patients)  · HIV-1 and HIV-2 by EIA, or CHRISTO, rapid antibody test or oral mucosa transudate  Patients must be at increased risk for HIV infection per USPSTF guidelines or pregnant. Tests covered annually for patient at increased risk or as requested by the patient. Pregnant patients may receive up to 3 tests during pregnancy.  Risks discussed, screening is not recommended    Smoking cessation counseling (up to 8 sessions per year)  Patients must be asymptomatic of tobacco-related conditions to receive as a preventative service.  Non-smoker     Subsequent annual wellness visit  At least 12 months since last AWV  Return in one year     The following information is provided to all patients.  This information is to help you find resources for any of the problems found today that may be affecting your health:                Living healthy guide: www.[facility].[place].[facility]      Understanding Diabetes: www.diabetes.org      Eating healthy: www.cdc.gov/healthyweight      CDC home safety checklist: www.cdc.gov/steadi/patient.html      Agency on Aging: www.goea.[place].[facility]      Alcoholics anonymous (AA): www.aa.org      Physical Activity: www.bob.nih.gov/iv0rbos      Tobacco use: www.quitwithusla.org

## 2017-07-31 NOTE — Clinical Note
Here for HRA. Mini-Cog score 2/7 with 1/3 word recall, 1/4 clock drawing. He denies any memory problems, but did not know which medications he is taking. Also he as lost 5 pounds since seeing you in May. Thanks

## 2017-07-31 NOTE — PROGRESS NOTES
HPI     Follow-up    Additional comments: Pt here for overdue follow up CE OS           Comments   Pt here for overdue follow up for sx OS.  S/p Complex Phaco IOL OS 11/3016    MEDS:  [facility] OS       Last edited by Paty Thomas MA on 7/31/2017 10:04 AM. (History)            Assessment /Plan     For exam results, see Encounter Report.    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 IFIS    PC IOL - od    Remote    Not sure who did it  or where it was done - if at [facility] it was pre- 2004    Ended up a bit hyperopic still - about +1.00   UNKNOWN  TYPE AND POWER OF IOL     Hyperopia / presbyopia     PLAN      S/P Complex phaco / IOL os - trypan blue and Dayan ring  - 11/30/2016    Aim for plano (other eye is a +1.00 and has PC IOL already )    PCB00 23.0    POM 8  phaco/IOL   Doing well   STOP pred acetate  Ok to use AT's prn     Rx glasses given - can go without if likes     F/u 1 YEAR - routine dilated eye exam ou - low risk for glaucoma - but is being monitored   Old pt of MACARIO Hartley   Also saw Johnathan in past   Or can see me     I have seen and personally examined the patient.  I agree with the findings, assessment and plan of the resident and/or fellow.     Annamarie Darling MD         grand daughter - Kathy Talavera - phone [phone] -

## 2017-10-03 ENCOUNTER — NURSE TRIAGE (OUTPATIENT)
Dept: ADMINISTRATIVE | Facility: CLINIC | Age: 82
End: 2017-10-03

## 2017-10-03 ENCOUNTER — HOSPITAL ENCOUNTER (EMERGENCY)
Facility: HOSPITAL | Age: 82
Discharge: HOME | End: 2017-10-03
Attending: EMERGENCY MEDICINE
Payer: MEDICARE

## 2017-10-03 VITALS
HEART RATE: 76 BPM | OXYGEN SATURATION: 99 % | HEIGHT: 69 IN | RESPIRATION RATE: 17 BRPM | TEMPERATURE: 99 F | DIASTOLIC BLOOD PRESSURE: 72 MMHG | BODY MASS INDEX: 23.7 KG/M2 | WEIGHT: 160 LBS | SYSTOLIC BLOOD PRESSURE: 160 MMHG

## 2017-10-03 DIAGNOSIS — M79.10 MYALGIA: Primary | ICD-10-CM

## 2017-10-03 DIAGNOSIS — R52 PAIN: ICD-10-CM

## 2017-10-03 DIAGNOSIS — G62.9 NEUROPATHY: ICD-10-CM

## 2017-10-03 DIAGNOSIS — M47.812 CERVICAL SPINE ARTHRITIS: ICD-10-CM

## 2017-10-03 DIAGNOSIS — I10 ESSENTIAL HYPERTENSION: ICD-10-CM

## 2017-10-03 LAB
ANION GAP SERPL CALC-SCNC: 10 MMOL/L
BASOPHILS # BLD AUTO: 0.02 K/UL
BASOPHILS NFR BLD: 0.3 %
BUN SERPL-MCNC: 11 MG/DL
CALCIUM SERPL-MCNC: 9.8 MG/DL
CHLORIDE SERPL-SCNC: 102 MMOL/L
CK SERPL-CCNC: 122 U/L
CO2 SERPL-SCNC: 27 MMOL/L
CREAT SERPL-MCNC: 1 MG/DL
DIFFERENTIAL METHOD: ABNORMAL
EOSINOPHIL # BLD AUTO: 0.1 K/UL
EOSINOPHIL NFR BLD: 1.1 %
ERYTHROCYTE [DISTWIDTH] IN BLOOD BY AUTOMATED COUNT: 14.2 %
EST. GFR  (AFRICAN AMERICAN): >60 ML/MIN/1.73 M^2
EST. GFR  (NON AFRICAN AMERICAN): >60 ML/MIN/1.73 M^2
GLUCOSE SERPL-MCNC: 90 MG/DL
HCT VFR BLD AUTO: 41.7 %
HGB BLD-MCNC: 13.5 G/DL
LYMPHOCYTES # BLD AUTO: 2 K/UL
LYMPHOCYTES NFR BLD: 30.3 %
MAGNESIUM SERPL-MCNC: 2.1 MG/DL
MCH RBC QN AUTO: 28.2 PG
MCHC RBC AUTO-ENTMCNC: 32.4 G/DL
MCV RBC AUTO: 87 FL
MONOCYTES # BLD AUTO: 0.8 K/UL
MONOCYTES NFR BLD: 11.4 %
NEUTROPHILS # BLD AUTO: 3.8 K/UL
NEUTROPHILS NFR BLD: 56.6 %
PHOSPHATE SERPL-MCNC: 2.9 MG/DL
PLATELET # BLD AUTO: 218 K/UL
PMV BLD AUTO: 9.7 FL
POTASSIUM SERPL-SCNC: 3.5 MMOL/L
RBC # BLD AUTO: 4.78 M/UL
SODIUM SERPL-SCNC: 139 MMOL/L
WBC # BLD AUTO: 6.66 K/UL

## 2017-10-03 PROCEDURE — 84100 ASSAY OF PHOSPHORUS: CPT

## 2017-10-03 PROCEDURE — 93005 ELECTROCARDIOGRAM TRACING: CPT

## 2017-10-03 PROCEDURE — 99285 EMERGENCY DEPT VISIT HI MDM: CPT | Mod: ,,, | Performed by: EMERGENCY MEDICINE

## 2017-10-03 PROCEDURE — 82550 ASSAY OF CK (CPK): CPT

## 2017-10-03 PROCEDURE — 93010 ELECTROCARDIOGRAM REPORT: CPT | Mod: ,,, | Performed by: INTERNAL MEDICINE

## 2017-10-03 PROCEDURE — 83735 ASSAY OF MAGNESIUM: CPT

## 2017-10-03 PROCEDURE — 85025 COMPLETE CBC W/AUTO DIFF WBC: CPT

## 2017-10-03 PROCEDURE — 99284 EMERGENCY DEPT VISIT MOD MDM: CPT | Mod: 25

## 2017-10-03 PROCEDURE — 25000003 PHARM REV CODE 250: Performed by: EMERGENCY MEDICINE

## 2017-10-03 PROCEDURE — 80048 BASIC METABOLIC PNL TOTAL CA: CPT

## 2017-10-03 RX ORDER — TRAMADOL HYDROCHLORIDE 50 MG/1
50 TABLET ORAL
Status: COMPLETED | OUTPATIENT
Start: 2017-10-03 | End: 2017-10-03

## 2017-10-03 RX ORDER — MELOXICAM 15 MG/1
15 TABLET ORAL DAILY
Qty: 15 TABLET | Refills: 0 | Status: SHIPPED | OUTPATIENT
Start: 2017-10-03 | End: 2017-10-31 | Stop reason: SDUPTHER

## 2017-10-03 RX ADMIN — TRAMADOL HYDROCHLORIDE 50 MG: 50 TABLET, FILM COATED ORAL at 06:10

## 2017-10-03 NOTE — TELEPHONE ENCOUNTER
Reason for Disposition   Abdominal pain and age > 60    Protocols used: ST BACK PAIN-A-OH    Patient is complaining of a constant pain his side that radiates from left to right.

## 2017-10-03 NOTE — ED NOTES
LOC: The patient is awake, alert, and oriented to place, time, situation. Affect is appropriate.  Speech is appropriate and clear.     APPEARANCE: Patient resting comfortably in no acute distress.  Patient is clean and well groomed.    SKIN: The skin is warm and dry; color consistent with ethnicity.  Patient has normal skin turgor and moist mucus membranes.  Skin intact; no breakdown or bruising noted.     MUSCULOSKELETAL: Patient moving upper and lower extremities without difficulty.  Report weakness. C/o neck pain, left foot pain. Ambulates with cane assist.    RESPIRATORY: Airway is open and patent. Respirations spontaneous, even, easy, and non-labored.  Patient has a normal effort and rate.  No accessory muscle use noted. Denies cough.     CARDIAC:   No peripheral edema noted. No complaints of chest pain.      ABDOMEN: Soft and non tender to palpation.  No distention noted.     NEUROLOGIC: Eyes open spontaneously.  Behavior appropriate to situation.  Follows commands; facial expression symmetrical.  Purposeful motor response noted;  Report decreased sensation in all extremities.

## 2017-10-03 NOTE — ED PROVIDER NOTES
Encounter Date: 10/3/2017    SCRIBE #1 NOTE: I, Shirlene Anthony, am scribing for, and in the presence of,  Dr. Sierra. I have scribed the following portions of the note - Other sections scribed: CAMMIE ROS.       History     Chief Complaint   Patient presents with    Neck Pain     Pt presented to the ED c/o neck pain and left side pain on and off. Pt c/o back pain as well. Pt denies injury.      Time seen by provider: 5:57 PM    This is a 86 y.o. male with PM Hx of HTN, HLD, BPH, and arthritis who presents with complaint of waxing and waning LUE pain and left-sided neck pain for two days. Pt states symptoms originally started on the right side and describes pain as sharp. Now pain mostly on the left side affecting his neck, and upper and lower extremities. No exacerbating or relieving factors. Pt has been taking Tylenol for pain, last dose at 3 PM today. Pt denies fever, cough, sore throat, ear pain, dysuria, hematuria, constipation, or any other symptoms at this time. No associated sleep disturbance. Pt denies any recent increased activity, falls, or trauma. He denies any hx of similar pain however his relative states he complains of such pain every other month or so. No hx of DM but relative reports hx of neuropathy.     Pt also reports weight loss in the last 3-6 months. Relative states he does not eat much in general and tends to eat wieners often. LBM this morning.           The history is provided by the patient, a relative and medical records.     Review of patient's allergies indicates:  No Known Allergies  Past Medical History:   Diagnosis Date    Amblyopia     refractive amblyopia left eye ()    Arthritis     BPH (benign prostatic hypertrophy)     Colon polyp     Hyperlipidemia     Hypertension      Past Surgical History:   Procedure Laterality Date    CATARACT EXTRACTION W/  INTRAOCULAR LENS IMPLANT Right n/a    done at [facility] before 2004    CATARACT EXTRACTION W/  INTRAOCULAR LENS IMPLANT  [facility]    EYE SURGERY      PROSTATE SURGERY      TRANSURETHRAL RESECTION OF PROSTATE       Family History   Problem Relation Age of Onset    Diabetes Father     Diabetes Paternal Aunt     Cancer Brother     No Known Problems Mother     Heart disease Neg Hx     Amblyopia Neg Hx     Blindness Neg Hx     Cataracts Neg Hx     Glaucoma Neg Hx     Hypertension Neg Hx     Macular degeneration Neg Hx     Retinal detachment Neg Hx     Strabismus Neg Hx     Stroke Neg Hx     Thyroid disease Neg Hx      Social History   Substance Use Topics    Smoking status: Former Smoker     Quit date: 9/26/1987    Smokeless tobacco: Never Used    Alcohol use No     Review of Systems   Constitutional: Positive for unexpected weight change (weight loss in the last 3-6 months). Negative for chills and fever.   HENT: Negative for congestion, ear pain and sore throat.    Eyes: Negative for visual disturbance.   Respiratory: Negative for cough and shortness of breath.    Cardiovascular: Negative for chest pain.   Gastrointestinal: Negative for abdominal pain, constipation, nausea and vomiting.   Genitourinary: Negative for dysuria and hematuria.   Musculoskeletal:        Positive left-sided neck pain, LUE pain, and LLE pain.   Skin: Negative for rash.   Neurological: Negative for headaches.       Physical Exam     Initial Vitals [10/03/17 1646]   BP Pulse Resp Temp SpO2   (!) 160/72 76 17 98.6 °F (37 °C) 99 %      MAP       101.33         Physical Exam    Constitutional: He appears well-developed and well-nourished. He is not diaphoretic. No distress.   HENT:   Head: Normocephalic and atraumatic.   Right Ear: External ear normal.   Left Ear: External ear normal.   Mouth/Throat: Oropharynx is clear and moist.   Eyes: Conjunctivae are normal. Pupils are equal, round, and reactive to light. Right eye exhibits no discharge. Left eye exhibits no discharge. No scleral icterus.   Neck: Normal range of  motion. Neck supple. No tracheal deviation present. No JVD present.   No bony tenderness, some reproduction of pain when turning head fully to L, mild tenderness over L trapezius, no rash, no rigidity   Cardiovascular: Normal rate, regular rhythm and intact distal pulses. Exam reveals no gallop.    Pulmonary/Chest: Breath sounds normal. No stridor. No respiratory distress. He has no wheezes. He has no rhonchi. He has no rales.   Abdominal: Soft. Bowel sounds are normal. He exhibits no distension. There is no tenderness.   Musculoskeletal: Normal range of motion. He exhibits no edema or tenderness.   No spinal tenderness   Lymphadenopathy:     He has no cervical adenopathy.   Neurological: He is alert and oriented to person, place, and time. He has normal strength. No cranial nerve deficit or sensory deficit.   Skin: Skin is warm and dry. No rash noted. No erythema.   Psychiatric: He has a normal mood and affect.         ED Course   Procedures  Labs Reviewed   CBC W/ AUTO DIFFERENTIAL - Abnormal; Notable for the following:        Result Value    Hemoglobin 13.5 (*)     All other components within normal limits   BASIC METABOLIC PANEL   MAGNESIUM   PHOSPHORUS   CK             Medical Decision Making:   History:   Old Medical Records: I decided to obtain old medical records.  Initial Assessment:   On further questioning, from both pt and relative, these are chronic complaints.  He has intermittent flares of this pain, which shoots down the arm primarily and occ the leg.  There is no focal weakness or sensory deficit, no headaches, no trauma, no fevers.  I suspect this is radicular pain, likely from djd.  Low suspicion for stroke, dissection, cord compression, other bony pathology.  Will check screening labs, provide with pain medication, and re-eval.      Feeling better after meds.  Screening studies w/o acute findings.  D/w pt/fam suspected cause and recommended home care, further mgt by pcp on an outpt basis.  They  indicate understanding and are agreeable to d/c.  Advised on reasons to return to ED.    Clinical Tests:   Lab Tests: Ordered and Reviewed  Radiological Study: Ordered and Reviewed  Medical Tests: Ordered and Reviewed            Scribe Attestation:   Scribe #1: I performed the above scribed service and the documentation accurately describes the services I performed. I attest to the accuracy of the note.            ED Course      Clinical Impression:   The primary encounter diagnosis was Myalgia. Diagnoses of Pain, Neuropathy, Essential hypertension, and Cervical spine arthritis were also pertinent to this visit.                           Bigg Sierra MD  10/08/17 3131

## 2017-10-31 ENCOUNTER — IMMUNIZATION (OUTPATIENT)
Dept: INTERNAL MEDICINE | Facility: CLINIC | Age: 82
End: 2017-10-31
Payer: MEDICARE

## 2017-10-31 ENCOUNTER — OFFICE VISIT (OUTPATIENT)
Dept: INTERNAL MEDICINE | Facility: CLINIC | Age: 82
End: 2017-10-31
Payer: MEDICARE

## 2017-10-31 VITALS
BODY MASS INDEX: 24 KG/M2 | HEIGHT: 69 IN | DIASTOLIC BLOOD PRESSURE: 72 MMHG | HEART RATE: 76 BPM | SYSTOLIC BLOOD PRESSURE: 110 MMHG | WEIGHT: 162.06 LBS

## 2017-10-31 DIAGNOSIS — I10 ESSENTIAL HYPERTENSION: Primary | ICD-10-CM

## 2017-10-31 DIAGNOSIS — R41.89 COGNITIVE IMPAIRMENT: ICD-10-CM

## 2017-10-31 PROCEDURE — 99214 OFFICE O/P EST MOD 30 MIN: CPT | Mod: S$GLB,,, | Performed by: INTERNAL MEDICINE

## 2017-10-31 PROCEDURE — G0008 ADMIN INFLUENZA VIRUS VAC: HCPCS | Mod: S$GLB,,, | Performed by: INTERNAL MEDICINE

## 2017-10-31 PROCEDURE — 99999 PR PBB SHADOW E&M-EST. PATIENT-LVL III: CPT | Mod: PBBFAC,,, | Performed by: INTERNAL MEDICINE

## 2017-10-31 PROCEDURE — 99499 UNLISTED E&M SERVICE: CPT | Mod: S$GLB,,, | Performed by: INTERNAL MEDICINE

## 2017-10-31 PROCEDURE — 90662 IIV NO PRSV INCREASED AG IM: CPT | Mod: S$GLB,,, | Performed by: INTERNAL MEDICINE

## 2017-10-31 RX ORDER — FINASTERIDE 5 MG/1
5 TABLET, FILM COATED ORAL DAILY
Qty: 90 TABLET | Refills: 3 | Status: SHIPPED | OUTPATIENT
Start: 2017-10-31 | End: 2018-11-28 | Stop reason: SDUPTHER

## 2017-10-31 RX ORDER — DONEPEZIL HYDROCHLORIDE 5 MG/1
5 TABLET, FILM COATED ORAL NIGHTLY
Qty: 30 TABLET | Refills: 11 | Status: SHIPPED | OUTPATIENT
Start: 2017-10-31 | End: 2018-06-22 | Stop reason: SDUPTHER

## 2017-10-31 RX ORDER — MELOXICAM 15 MG/1
15 TABLET ORAL DAILY
Qty: 30 TABLET | Refills: 5 | Status: SHIPPED | OUTPATIENT
Start: 2017-10-31 | End: 2019-06-07

## 2017-10-31 NOTE — PROGRESS NOTES
Subjective:       Patient ID: Kevyn Leach is a 86 y.o. male.    Chief Complaint: Annual Exam; Neck Pain (L); and Nail Problem (R foot)    Neck Pain    This is a chronic problem. The current episode started more than 1 year ago. The problem occurs intermittently. The problem has been waxing and waning. The quality of the pain is described as aching. The pain is moderate.   Neurologic Problem   The patient's primary symptoms include memory loss. This is a chronic problem. The neurological problem developed insidiously. The problem has been gradually worsening since onset. There was no focality noted.     Review of Systems   Psychiatric/Behavioral: Positive for memory loss.       Objective:      Physical Exam   Constitutional: He is oriented to person, place, and time. He appears well-developed and well-nourished. No distress.   HENT:   Head: Normocephalic and atraumatic.   Mouth/Throat: Oropharynx is clear and moist.   Eyes: Conjunctivae are normal. Pupils are equal, round, and reactive to light.   Neck: Normal range of motion. Neck supple.   Cardiovascular: Normal rate, regular rhythm and normal heart sounds.    Pulmonary/Chest: Effort normal and breath sounds normal. He has no wheezes.   Abdominal: Soft. Bowel sounds are normal. There is no tenderness.   Musculoskeletal: Normal range of motion. He exhibits no edema or tenderness.   Neurological: He is alert and oriented to person, place, and time. No cranial nerve deficit.   Skin: No erythema.   Psychiatric: He has a normal mood and affect.   Vitals reviewed.      Assessment:       1. Essential hypertension    2. Cognitive impairment        Plan:       Kevyn was seen today for annual exam, neck pain and nail problem.    Diagnoses and all orders for this visit:    Essential hypertension    Cognitive impairment  Comments:  mild, seen on HRA  Orders:  -     donepezil (ARICEPT) 5 MG tablet; Take 1 tablet (5 mg total) by mouth every evening.    Other orders  -      meloxicam (MOBIC) 15 MG tablet; Take 1 tablet (15 mg total) by mouth once daily.  -     finasteride (PROSCAR) 5 mg tablet; Take 1 tablet (5 mg total) by mouth once daily.        Return in about 6 months (around 4/30/2018) for F/U APPOINTMENT WITH ME.

## 2018-02-16 ENCOUNTER — HOSPITAL ENCOUNTER (EMERGENCY)
Facility: HOSPITAL | Age: 83
Discharge: HOME | End: 2018-02-16
Attending: FAMILY MEDICINE
Payer: MEDICARE

## 2018-02-16 VITALS
TEMPERATURE: 99 F | HEIGHT: 69 IN | SYSTOLIC BLOOD PRESSURE: 163 MMHG | HEART RATE: 76 BPM | RESPIRATION RATE: 16 BRPM | DIASTOLIC BLOOD PRESSURE: 83 MMHG | WEIGHT: 160 LBS | BODY MASS INDEX: 23.7 KG/M2 | OXYGEN SATURATION: 97 %

## 2018-02-16 DIAGNOSIS — R25.1 TREMOR, UNSPECIFIED: ICD-10-CM

## 2018-02-16 DIAGNOSIS — R61 DIAPHORESIS: Primary | ICD-10-CM

## 2018-02-16 DIAGNOSIS — R68.89 MULTIPLE COMPLAINTS: ICD-10-CM

## 2018-02-16 LAB
ALBUMIN SERPL BCP-MCNC: 3.4 G/DL
ALP SERPL-CCNC: 55 U/L
ALT SERPL W/O P-5'-P-CCNC: 10 U/L
ANION GAP SERPL CALC-SCNC: 9 MMOL/L
AST SERPL-CCNC: 19 U/L
BASOPHILS # BLD AUTO: 0.02 K/UL
BASOPHILS NFR BLD: 0.3 %
BILIRUB SERPL-MCNC: 0.5 MG/DL
BUN SERPL-MCNC: 14 MG/DL
CALCIUM SERPL-MCNC: 9.6 MG/DL
CHLORIDE SERPL-SCNC: 102 MMOL/L
CO2 SERPL-SCNC: 30 MMOL/L
CREAT SERPL-MCNC: 1 MG/DL
DIFFERENTIAL METHOD: ABNORMAL
EOSINOPHIL # BLD AUTO: 0.1 K/UL
EOSINOPHIL NFR BLD: 1.5 %
ERYTHROCYTE [DISTWIDTH] IN BLOOD BY AUTOMATED COUNT: 13.9 %
EST. GFR  (AFRICAN AMERICAN): >60 ML/MIN/1.73 M^2
EST. GFR  (NON AFRICAN AMERICAN): >60 ML/MIN/1.73 M^2
GLUCOSE SERPL-MCNC: 84 MG/DL
HCT VFR BLD AUTO: 41.1 %
HGB BLD-MCNC: 13 G/DL
IMM GRANULOCYTES # BLD AUTO: 0.01 K/UL
IMM GRANULOCYTES NFR BLD AUTO: 0.2 %
LYMPHOCYTES # BLD AUTO: 1.7 K/UL
LYMPHOCYTES NFR BLD: 28.3 %
MCH RBC QN AUTO: 27.5 PG
MCHC RBC AUTO-ENTMCNC: 31.6 G/DL
MCV RBC AUTO: 87 FL
MONOCYTES # BLD AUTO: 0.8 K/UL
MONOCYTES NFR BLD: 13.7 %
NEUTROPHILS # BLD AUTO: 3.4 K/UL
NEUTROPHILS NFR BLD: 56 %
NRBC BLD-RTO: 0 /100 WBC
PLATELET # BLD AUTO: 204 K/UL
PMV BLD AUTO: 9.8 FL
POTASSIUM SERPL-SCNC: 3.5 MMOL/L
PROT SERPL-MCNC: 8 G/DL
RBC # BLD AUTO: 4.73 M/UL
SODIUM SERPL-SCNC: 141 MMOL/L
TSH SERPL DL<=0.005 MIU/L-ACNC: 0.56 UIU/ML
WBC # BLD AUTO: 6.05 K/UL

## 2018-02-16 PROCEDURE — 93005 ELECTROCARDIOGRAM TRACING: CPT

## 2018-02-16 PROCEDURE — 80053 COMPREHEN METABOLIC PANEL: CPT

## 2018-02-16 PROCEDURE — 99284 EMERGENCY DEPT VISIT MOD MDM: CPT | Mod: ,,, | Performed by: FAMILY MEDICINE

## 2018-02-16 PROCEDURE — 85025 COMPLETE CBC W/AUTO DIFF WBC: CPT

## 2018-02-16 PROCEDURE — 84443 ASSAY THYROID STIM HORMONE: CPT

## 2018-02-16 PROCEDURE — 93010 ELECTROCARDIOGRAM REPORT: CPT | Mod: ,,, | Performed by: INTERNAL MEDICINE

## 2018-02-16 PROCEDURE — 99284 EMERGENCY DEPT VISIT MOD MDM: CPT | Mod: 25

## 2018-02-16 NOTE — ED PROVIDER NOTES
Encounter Date: 2/16/2018    SCRIBE #1 NOTE: I, Armen Sharp, am scribing for, and in the presence of,  Dr. Prasad. I have scribed the following portions of the note - Other sections scribed: HPI, ROS, PE.       History     Chief Complaint   Patient presents with    Multiple Complaints     woke up sweating, burning all over, R arm is shaking more than usual, lil pain to r side of neck     Time patient was seen by the provider: 12:25 PM      The patient is a 86 y.o. male with co-morbidities including: HLD, HTN, and arthritis. He woke up twice last night with diaphoresis. The pt notes that he has these symptoms every three months but not as severe. The pt did not do anything significant yesterday except for cutting his lawn; he had not done it in a while though.  When he woke up the first time, the pt noted of  burning sensation all over his body that lasted for few seconds; the daughter notes that she noticed tremor on his rt arm. These symptoms are not present at the time of exam.  The pt also notes of posterior neck pain. He denies N/V/D, chest pain, SOB, fever(??), trouble swallowing,dysuria or any neurological complaints. He had his flu shot this year and denies any sick contact. He has not eaten anything this morning; states that he did not feel hungry . No change in medications.  No other complaints.      The history is provided by the patient, medical records and a relative.     Review of patient's allergies indicates:  No Known Allergies  Past Medical History:   Diagnosis Date    Amblyopia     refractive amblyopia left eye ()    Arthritis     BPH (benign prostatic hypertrophy)     Colon polyp     Hyperlipidemia     Hypertension      Past Surgical History:   Procedure Laterality Date    CATARACT EXTRACTION W/  INTRAOCULAR LENS IMPLANT Right n/a    done at [facility] before 2004    CATARACT EXTRACTION W/  INTRAOCULAR LENS IMPLANT [facility]    EYE SURGERY      PROSTATE  SURGERY      TRANSURETHRAL RESECTION OF PROSTATE       Family History   Problem Relation Age of Onset    Diabetes Father     Diabetes Paternal Aunt     Cancer Brother     No Known Problems Mother     Heart disease Neg Hx     Amblyopia Neg Hx     Blindness Neg Hx     Cataracts Neg Hx     Glaucoma Neg Hx     Hypertension Neg Hx     Macular degeneration Neg Hx     Retinal detachment Neg Hx     Strabismus Neg Hx     Stroke Neg Hx     Thyroid disease Neg Hx      Social History   Substance Use Topics    Smoking status: Former Smoker     Quit date: 9/26/1987    Smokeless tobacco: Never Used    Alcohol use No     Review of Systems   Constitutional: Positive for diaphoresis. Negative for fever.        Positive for burning sensation all over the body.   HENT: Negative for sore throat and trouble swallowing.    Respiratory: Negative for shortness of breath.    Cardiovascular: Negative for chest pain.   Gastrointestinal: Negative for diarrhea, nausea and vomiting.   Genitourinary: Negative for dysuria.   Musculoskeletal: Negative for back pain.   Skin: Negative for rash.   Neurological: Positive for tremors. Negative for weakness.   Hematological: Does not bruise/bleed easily.       Physical Exam     Initial Vitals [02/16/18 1007]   BP Pulse Resp Temp SpO2   (!) 188/91 86 18 98.4 °F (36.9 °C) 99 %      MAP       123.33         Physical Exam    Nursing note and vitals reviewed.  Constitutional: No distress.   Pt is elderly, appears younger than his age.    HENT:   Head: Normocephalic and atraumatic.   Eyes: No scleral icterus.   Neck:   No tenderness, no masses.    Cardiovascular: Normal rate, regular rhythm, normal heart sounds and intact distal pulses.   Pulmonary/Chest: Breath sounds normal. No respiratory distress. He has no wheezes. He has no rhonchi. He has no rales.   Abdominal: Soft. He exhibits no distension. There is no tenderness. There is no rebound and no guarding.   Musculoskeletal:   Full ROM in  extremities x4.  Equal strength bilaterally. No clubbing, cyanosis, or edema. Peripheral vascular at baseline.   Neurological: He is alert and oriented to person, place, and time.   No acute focal deficits.          ED Course   Procedures  Labs Reviewed - No data to display          Medical Decision Making:   History:   Old Medical Records: I decided to obtain old medical records.  Clinical Tests:   Lab Tests: Ordered and Reviewed  Medical Tests: Ordered and Reviewed  ED Management:  Unremarkable.  Blood and urine tests.  Patient stable for discharge.  Follow-up with PCP for additional symptoms            Scribe Attestation:   Scribe #1: I performed the above scribed service and the documentation accurately describes the services I performed. I attest to the accuracy of the note.               Clinical Impression:   The encounter diagnosis was Multiple complaints.                           Rolando Prasad MD  02/16/18 0977

## 2018-02-16 NOTE — ED NOTES
LOC: The patient is awake, alert and aware of environment with an appropriate affect, the patient is oriented x 3 and speaking appropriately.  APPEARANCE: Patient resting comfortably and in no acute distress, patient is clean and well groomed, patient's clothing is properly fastened.  SKIN: The skin is warm and dry, color consistent with ethnicity, patient has normal skin turgor and moist mucus membranes, skin intact, no breakdown or bruising noted.  MUSCULOSKELETAL: Patient moving all extremities spontaneously, no obvious swelling or deformities noted.  RESPIRATORY: Airway is open and patent, respirations are spontaneous, patient has a normal effort and rate, no accessory muscle use noted.  ABDOMEN: Soft and non tender to palpation, no distention noted.  NEUROLOGIC:  facial expression is symmetrical, patient moving all extremities spontaneously, normal sensation in all extremities when touched with a finger.  Follows all commands appropriately.

## 2018-02-16 NOTE — ED TRIAGE NOTES
"Pt states the he went to bed fine last night, he woke up at 1 am and 6 am and "everything was wet".  Pt states he was sweating profusely and "felt like my body was burning". Pt's granddaughter states pt with vague symptoms this morning.  Granddaughter reports pt with hand tremors "when you mention it".  "

## 2018-02-16 NOTE — DISCHARGE INSTRUCTIONS
Your blood tests are fine.      It does not appear that your episode of severe night sweat this morning was the result of anything dangerous.  Continue your usual medicines  and activities.  Report any persistent symptoms or problems to Dr. Lindquist or return to ED.

## 2018-04-30 DIAGNOSIS — I10 ESSENTIAL HYPERTENSION: ICD-10-CM

## 2018-04-30 DIAGNOSIS — N40.0 BENIGN NON-NODULAR PROSTATIC HYPERPLASIA WITHOUT LOWER URINARY TRACT SYMPTOMS: ICD-10-CM

## 2018-04-30 DIAGNOSIS — N40.0 BENIGN PROSTATIC HYPERPLASIA: ICD-10-CM

## 2018-04-30 DIAGNOSIS — E78.5 HYPERLIPIDEMIA: ICD-10-CM

## 2018-04-30 RX ORDER — LOVASTATIN 20 MG/1
TABLET ORAL
Qty: 60 TABLET | Refills: 6 | Status: SHIPPED | OUTPATIENT
Start: 2018-04-30 | End: 2019-04-23 | Stop reason: SDUPTHER

## 2018-04-30 RX ORDER — TAMSULOSIN HYDROCHLORIDE 0.4 MG/1
CAPSULE ORAL
Qty: 90 CAPSULE | Refills: 12 | Status: SHIPPED | OUTPATIENT
Start: 2018-04-30 | End: 2019-07-31 | Stop reason: SDUPTHER

## 2018-04-30 RX ORDER — TRIAMTERENE AND HYDROCHLOROTHIAZIDE 37.5; 25 MG/1; MG/1
CAPSULE ORAL
Qty: 90 CAPSULE | Refills: 3 | Status: SHIPPED | OUTPATIENT
Start: 2018-04-30 | End: 2019-04-03

## 2018-05-18 ENCOUNTER — HOSPITAL ENCOUNTER (EMERGENCY)
Facility: HOSPITAL | Age: 83
Discharge: HOME | End: 2018-05-18
Attending: EMERGENCY MEDICINE
Payer: MEDICARE

## 2018-05-18 VITALS
DIASTOLIC BLOOD PRESSURE: 72 MMHG | SYSTOLIC BLOOD PRESSURE: 148 MMHG | HEART RATE: 87 BPM | HEIGHT: 69 IN | RESPIRATION RATE: 16 BRPM | BODY MASS INDEX: 20.73 KG/M2 | TEMPERATURE: 98 F | OXYGEN SATURATION: 97 % | WEIGHT: 140 LBS

## 2018-05-18 DIAGNOSIS — S30.810A ABRASION OF LEFT BUTTOCK, INITIAL ENCOUNTER: Primary | ICD-10-CM

## 2018-05-18 DIAGNOSIS — R68.84 JAW PAIN, NON-TMJ: ICD-10-CM

## 2018-05-18 DIAGNOSIS — R52 PAIN: ICD-10-CM

## 2018-05-18 PROCEDURE — 99283 EMERGENCY DEPT VISIT LOW MDM: CPT | Mod: 25

## 2018-05-18 PROCEDURE — 99284 EMERGENCY DEPT VISIT MOD MDM: CPT | Mod: ,,, | Performed by: EMERGENCY MEDICINE

## 2018-05-18 RX ORDER — BACITRACIN ZINC 500 UNIT/G
OINTMENT (GRAM) TOPICAL 2 TIMES DAILY
Qty: 30 G | Refills: 0 | Status: SHIPPED | OUTPATIENT
Start: 2018-05-18 | End: 2018-05-23

## 2018-05-18 NOTE — ED TRIAGE NOTES
Patient states pain to left buttock x 2 days, superficial abrasion to left middle buttock, pain with sitting

## 2018-05-18 NOTE — ED NOTES
Patient identifiers verified and correct for  Walker  C/C: Pain and abrasion to left buttock,   APPEARANCE: awake and alert in NAD.  SKIN: warm, dry and intact. Scratch like westley to left inner buttock, no open areas, no redness  MUSCULOSKELETAL: Patient moving all extremities spontaneously, no obvious swelling or deformities noted. Ambulates independently.  RESPIRATORY: Denies shortness of breath.Respirations unlabored.   CARDIAC: Denies CP, 2+ distal pulses; no peripheral edema  ABDOMEN: S/ND/NT, Denies nausea  : voids spontaneously, denies difficulty  Neurologic: AAO x 4; follows commands equal strength in all extremities; denies numbness/tingling. Denies dizziness

## 2018-05-18 NOTE — ED PROVIDER NOTES
SCRIBE #1 NOTE: I, Natali June, am scribing for, and in the presence of, Dr. Zimmerman.       CC: Tailbone Pain      HPI: Kevyn Leach is a 86 y.o. year old male who presents to the ED complaining of a blister on his left buttock for the past few days that causes pain when he sits. He also endorses bilateral leg pain. Pt denies any recent falls. He denies any other acute complaints aside from some soreness in his mouth for the past few days. Pt does not have teeth in the affected area on the left side. He states that his mouth hurts when he chews        Past Medical History:   Diagnosis Date    Amblyopia     refractive amblyopia left eye ()    Arthritis     BPH (benign prostatic hypertrophy)     Colon polyp     Hyperlipidemia     Hypertension      Past Surgical History:   Procedure Laterality Date    CATARACT EXTRACTION W/  INTRAOCULAR LENS IMPLANT Right n/a    done at [facility] before 2004    CATARACT EXTRACTION W/  INTRAOCULAR LENS IMPLANT Left 11/30/2016    OS     EYE SURGERY      PROSTATE SURGERY      TRANSURETHRAL RESECTION OF PROSTATE       Family History   Problem Relation Age of Onset    Diabetes Father     Diabetes Paternal Aunt     Cancer Brother     No Known Problems Mother     Heart disease Neg Hx     Amblyopia Neg Hx     Blindness Neg Hx     Cataracts Neg Hx     Glaucoma Neg Hx     Hypertension Neg Hx     Macular degeneration Neg Hx     Retinal detachment Neg Hx     Strabismus Neg Hx     Stroke Neg Hx     Thyroid disease Neg Hx      No current facility-administered medications on file prior to encounter.      Current Outpatient Prescriptions on File Prior to Encounter   Medication Sig Dispense Refill    docusate sodium (COLACE) 100 MG capsule Take 1 capsule (100 mg total) by mouth 2 (two) times daily. 60 capsule 0    donepezil (ARICEPT) 5 MG tablet Take 1 tablet (5 mg total) by mouth every evening. 30 tablet 11    finasteride (PROSCAR) 5 mg tablet Take 1  tablet (5 mg total) by mouth once daily. 90 tablet 3    hydrocortisone 2.5 % cream Apply topically 2 (two) times daily. 28 g 1    lovastatin (MEVACOR) 20 MG tablet TAKE ONE TABLET BY MOUTH ONCE DAILY 60 tablet 6    meloxicam (MOBIC) 15 MG tablet Take 1 tablet (15 mg total) by mouth once daily. 30 tablet 5    polyethylene glycol (GLYCOLAX) 17 gram/dose powder Take 17 g by mouth once daily. 595 g 0    tamsulosin (FLOMAX) 0.4 mg Cp24 TAKE ONE CAPSULE BY MOUTH ONCE DAILY 90 capsule 12    triamterene-hydrochlorothiazide 37.5-25 mg (DYAZIDE) 37.5-25 mg per capsule TAKE ONE CAPSULE BY MOUTH ONCE DAILY 90 capsule 3    verapamil (CALAN-SR) 240 MG CR tablet TAKE ONE TABLET BY MOUTH ONCE DAILY 90 tablet 12     Patient has no known allergies.  Social History     Social History    Marital status:      Spouse name: N/A    Number of children: N/A    Years of education: N/A     Social History Main Topics    Smoking status: Former Smoker     Quit date: 9/26/1987    Smokeless tobacco: Never Used    Alcohol use No    Drug use: No    Sexual activity: No     Other Topics Concern    None     Social History Narrative    Lives with wife; 3 kids; 10 grandkids; 5 great grandkids    +driving day and night       ROS:     Constitutional : neg  HEENT post for left sided jaw pain  Resp neg  Cardiac  neg  GI neg   neg  Neuro neg  Heme/Immune: neg  Endo neg  Skin pos for blister on the left buttock    PHYSICAL EXAM:  Vitals:    05/18/18 1121   BP: (!) 148/72   Pulse: 87   Resp: 16   Temp: 98.4 °F (36.9 °C)         PHYSICAL EXAM:   general: comfortable, in no acute distress  VS: triage VS reviewed  HEENT: NC/AT, PERRL, EOMI. Jaw: mild ttp over the left mandibular body, No erythema, no edema, no deformity, and no mass that is palpated over the jaw, no pain over the gingiva, no swelling or lesions over the gingiva. No trismus. Submandibular gland is soft and non tender.   CV: RRR, no m/r/g, no LE pitting edema  Resp:  CTAB  ABD:  ND, + normal BS, NT  Renal: No CVAT  Neuro: AAO x 3, 5/5 muscle strength in upper and lower extremities, sensation grossly intact, face symmetric, speech normal  Ext: no deformity, +2 symmetrical peripheral pulses   Skin: wound over the left buttock appears to be an abrasion with slight erythema and induration along the borders of the wound, no fluctuance, no discharge.           DATA & INTERVENTIONS:    LABS reviewed:  Labs Reviewed - No data to display    RADIOLOGY reviewed:  Imaging Results          X-Ray Mandible More Than 4 Views (Final result)  Result time 05/18/18 13:05:47    Final result by Ad Cramer MD (05/18/18 13:05:47)                 Impression:      No acute abnormality.      Electronically signed by: Ad Cramer MD  Date:    05/18/2018  Time:    13:05             Narrative:    EXAMINATION:  XR MANDIBLE MORE THAN 4 VIEWS    CLINICAL HISTORY:  Pain, unspecified    TECHNIQUE:  Mandible, four views.    COMPARISON:  None    FINDINGS:  No displaced fracture or subluxation.  No lytic or blastic bone lesion.    Paranasal sinuses and mastoid air cells are clear.    Unremarkable soft tissues.                                MEDICATIONS/FLUIDS:  Medications - No data to display      MDM: 86 year old male presents with wound over the left buttock and left sided jaw pain. Unlikely infection or mass. Will get an XR.     XR Mandible with no bony lesions.   Left buttock wound appears to be an abrasion, no concern for infection         IMPRESSION:  1.) Left buttock abrasion   2.) Jaw pain    Dispo: Discharge    Critical Care Time: N/A     Kim Zimmreman MD  05/19/18 0912

## 2018-06-22 ENCOUNTER — OFFICE VISIT (OUTPATIENT)
Dept: INTERNAL MEDICINE | Facility: CLINIC | Age: 83
End: 2018-06-22
Payer: MEDICARE

## 2018-06-22 VITALS
DIASTOLIC BLOOD PRESSURE: 60 MMHG | WEIGHT: 165.38 LBS | SYSTOLIC BLOOD PRESSURE: 122 MMHG | HEART RATE: 90 BPM | HEIGHT: 69 IN | BODY MASS INDEX: 24.5 KG/M2

## 2018-06-22 DIAGNOSIS — R41.89 COGNITIVE IMPAIRMENT: ICD-10-CM

## 2018-06-22 DIAGNOSIS — L98.9 SKIN LESION OF LEFT LOWER EXTREMITY: ICD-10-CM

## 2018-06-22 DIAGNOSIS — I70.0 AORTIC ATHEROSCLEROSIS: ICD-10-CM

## 2018-06-22 DIAGNOSIS — M47.812 CERVICAL SPINE ARTHRITIS: ICD-10-CM

## 2018-06-22 DIAGNOSIS — I10 ESSENTIAL HYPERTENSION: Primary | ICD-10-CM

## 2018-06-22 PROCEDURE — 99999 PR PBB SHADOW E&M-EST. PATIENT-LVL III: CPT | Mod: PBBFAC,,, | Performed by: INTERNAL MEDICINE

## 2018-06-22 PROCEDURE — 99214 OFFICE O/P EST MOD 30 MIN: CPT | Mod: S$GLB,,, | Performed by: INTERNAL MEDICINE

## 2018-06-22 PROCEDURE — 99499 UNLISTED E&M SERVICE: CPT | Mod: S$PBB,,, | Performed by: INTERNAL MEDICINE

## 2018-06-22 RX ORDER — DONEPEZIL HYDROCHLORIDE 10 MG/1
10 TABLET, FILM COATED ORAL NIGHTLY
Qty: 30 TABLET | Refills: 11 | Status: SHIPPED | OUTPATIENT
Start: 2018-06-22 | End: 2019-06-25 | Stop reason: SDUPTHER

## 2018-06-22 NOTE — PROGRESS NOTES
Subjective:       Patient ID: Kevyn Leach is a 86 y.o. male.    Chief Complaint: Annual Exam    Hypertension   This is a chronic problem. The current episode started today. The problem is unchanged. The problem is controlled. Pertinent negatives include no chest pain or shortness of breath. The current treatment provides significant improvement. There are no compliance problems.      Review of Systems   Respiratory: Negative for shortness of breath.    Cardiovascular: Negative for chest pain.   Skin:        Continuing complaints of lesion left buttock-  2 Ed visits with no findings       Objective:      Physical Exam   Constitutional: He is oriented to person, place, and time. He appears well-developed and well-nourished. No distress.   HENT:   Head: Normocephalic and atraumatic.   Mouth/Throat: Oropharynx is clear and moist.   Eyes: Conjunctivae are normal. Pupils are equal, round, and reactive to light.   Neck: Normal range of motion. Neck supple.   Cardiovascular: Normal rate, regular rhythm and normal heart sounds.    Pulmonary/Chest: Effort normal and breath sounds normal. He has no wheezes.   Abdominal: Soft. Bowel sounds are normal. There is no tenderness.   Musculoskeletal: Normal range of motion. He exhibits no edema or tenderness.   Neurological: He is alert and oriented to person, place, and time. No cranial nerve deficit.   Skin: No erythema.   Minor skin irri/excoriation left buttock   Psychiatric: He has a normal mood and affect.   Vitals reviewed.      Assessment:       1. Essential hypertension    2. Skin lesion of left lower extremity    3. Cognitive impairment    4. Aortic atherosclerosis    5. Cervical spine arthritis        Plan:       Kevyn was seen today for annual exam.    Diagnoses and all orders for this visit:    Essential hypertension  -     CBC auto differential; Future  -     Comprehensive metabolic panel; Future    Skin lesion of left lower extremity  Comments:  continued concerns  in spite of minimal findings-  ?fixed delusion?  Will ask Derm to confirm no disease process present, also asked michelleter to get a seat cushio  Orders:  -     Ambulatory consult to Dermatology    Cognitive impairment  Comments:  better with Aricept-  will incr dose  Orders:  -     donepezil (ARICEPT) 10 MG tablet; Take 1 tablet (10 mg total) by mouth every evening.    Aortic atherosclerosis  Comments:  meds stable-  no symptoms    Cervical spine arthritis  Comments:  cont pain-  tries exercises and Mobic-  no change in pattern, no change in mgmt plan        Follow-up in about 9 months (around 3/22/2019) for F/U APPOINTMENT WITH ME, WITH LAB BEFORE.

## 2018-06-25 RX ORDER — MELOXICAM 15 MG/1
TABLET ORAL
Qty: 30 TABLET | Refills: 12 | Status: SHIPPED | OUTPATIENT
Start: 2018-06-25 | End: 2019-04-03

## 2018-07-26 DIAGNOSIS — I10 ESSENTIAL HYPERTENSION: ICD-10-CM

## 2018-07-27 RX ORDER — VERAPAMIL HYDROCHLORIDE 240 MG/1
TABLET, FILM COATED, EXTENDED RELEASE ORAL
Qty: 90 TABLET | Refills: 12 | Status: SHIPPED | OUTPATIENT
Start: 2018-07-27 | End: 2019-07-31 | Stop reason: SDUPTHER

## 2018-09-18 ENCOUNTER — TELEPHONE (OUTPATIENT)
Dept: INTERNAL MEDICINE | Facility: CLINIC | Age: 83
End: 2018-09-18

## 2018-09-18 NOTE — TELEPHONE ENCOUNTER
----- Message from Shefali Tian sent at 9/17/2018 11:28 AM CDT -----  Contact: Grand daughter Barbara Cell# [phone]  She's calling in regards to wanting to speak with you about her grandfather.

## 2018-09-18 NOTE — TELEPHONE ENCOUNTER
Spoke with Barbara, she took her grandfather to the ER yesterday and has to follow up with us in 3 days.    Barbara will schedule an appointment either Thursday or Friday via pt portal.

## 2018-12-03 RX ORDER — FINASTERIDE 5 MG/1
TABLET, FILM COATED ORAL
Qty: 90 TABLET | Refills: 3 | Status: SHIPPED | OUTPATIENT
Start: 2018-12-03 | End: 2019-11-01 | Stop reason: SDUPTHER

## 2018-12-06 ENCOUNTER — TELEPHONE (OUTPATIENT)
Dept: INTERNAL MEDICINE | Facility: CLINIC | Age: 83
End: 2018-12-06

## 2018-12-06 ENCOUNTER — OFFICE VISIT (OUTPATIENT)
Dept: INTERNAL MEDICINE | Facility: CLINIC | Age: 83
End: 2018-12-06
Payer: MEDICARE

## 2018-12-06 VITALS
HEIGHT: 69 IN | TEMPERATURE: 98 F | OXYGEN SATURATION: 95 % | DIASTOLIC BLOOD PRESSURE: 78 MMHG | SYSTOLIC BLOOD PRESSURE: 132 MMHG | WEIGHT: 157.63 LBS | HEART RATE: 76 BPM | BODY MASS INDEX: 23.35 KG/M2

## 2018-12-06 DIAGNOSIS — L85.3 DRY SKIN DERMATITIS: ICD-10-CM

## 2018-12-06 DIAGNOSIS — M15.9 OSTEOARTHRITIS OF MULTIPLE JOINTS, UNSPECIFIED OSTEOARTHRITIS TYPE: ICD-10-CM

## 2018-12-06 DIAGNOSIS — R52 GENERALIZED BODY ACHES: Primary | ICD-10-CM

## 2018-12-06 PROCEDURE — 99999 PR PBB SHADOW E&M-EST. PATIENT-LVL IV: CPT | Mod: PBBFAC,,, | Performed by: NURSE PRACTITIONER

## 2018-12-06 PROCEDURE — 99213 OFFICE O/P EST LOW 20 MIN: CPT | Mod: 25,S$GLB,, | Performed by: NURSE PRACTITIONER

## 2018-12-06 PROCEDURE — 96372 THER/PROPH/DIAG INJ SC/IM: CPT | Mod: S$GLB,,, | Performed by: NURSE PRACTITIONER

## 2018-12-06 PROCEDURE — 1101F PT FALLS ASSESS-DOCD LE1/YR: CPT | Mod: CPTII,S$GLB,, | Performed by: NURSE PRACTITIONER

## 2018-12-06 RX ORDER — BETAMETHASONE SODIUM PHOSPHATE AND BETAMETHASONE ACETATE 3; 3 MG/ML; MG/ML
6 INJECTION, SUSPENSION INTRA-ARTICULAR; INTRALESIONAL; INTRAMUSCULAR; SOFT TISSUE
Status: COMPLETED | OUTPATIENT
Start: 2018-12-06 | End: 2018-12-06

## 2018-12-06 RX ADMIN — BETAMETHASONE SODIUM PHOSPHATE AND BETAMETHASONE ACETATE 6 MG: 3; 3 INJECTION, SUSPENSION INTRA-ARTICULAR; INTRALESIONAL; INTRAMUSCULAR; SOFT TISSUE at 04:12

## 2018-12-06 NOTE — TELEPHONE ENCOUNTER
----- Message from Lilian Briseno sent at 12/6/2018  2:49 PM CST -----  Contact: Pam - [phone]      ----- Message -----  From: Nela Juárez  Sent: 12/6/2018   2:45 PM  To: Ameena Singh Staff    Patients granddaughter is requesting a call from the office. She stated patient Is having pain in his knees.    Please advise, thanks

## 2018-12-06 NOTE — PROGRESS NOTES
"Subjective:       Patient ID: Kevyn Leach is a 87 y.o. male.    Chief Complaint: Pain (whole body pain and feel like burning)    HPI:  86 yo male that presents to clinic today with complaint of generalized body aches/pain.    States that pain started a few weeks ago.  Denies any trauma.  States that he is currently taking daily mobic 15mg po daily.  States that aches are "all over in hands, legs, knees, hands, arms and shoulders."  States that he feels like it "aches and burns at times."    Rates current discomfort as a 6/10.    Patient also has complaint of dry skin all over his body.    Denies any fever, SOB, chest pain, n/v or dizziness.  States that appetite and energy level are okay.    Review of Systems   Constitutional: Negative for activity change, appetite change, fatigue and fever.   Respiratory: Negative for apnea, cough, shortness of breath and wheezing.    Cardiovascular: Negative for chest pain, palpitations and leg swelling.   Gastrointestinal: Negative for abdominal distention, abdominal pain, constipation, diarrhea, nausea and vomiting.   Musculoskeletal: Positive for arthralgias. Negative for joint swelling, neck pain and neck stiffness.   Skin: Positive for color change.        Admits dry skin   Neurological: Negative for dizziness, light-headedness, numbness and headaches.   Psychiatric/Behavioral: Negative for behavioral problems.       Objective:      Physical Exam   Constitutional: He is oriented to person, place, and time. He appears well-developed and well-nourished. No distress.   Neck: Normal range of motion. Neck supple. No thyromegaly present.   Cardiovascular: Normal rate, regular rhythm, normal heart sounds and intact distal pulses.   No murmur heard.  Pulmonary/Chest: Effort normal and breath sounds normal. No stridor. No respiratory distress. He has no wheezes. He has no rales.   Musculoskeletal: Normal range of motion.   There is no paint with palpation to joints. "   Lymphadenopathy:     He has no cervical adenopathy.   Neurological: He is alert and oriented to person, place, and time. No cranial nerve deficit or sensory deficit.   Skin: Skin is warm and dry. No rash noted.   Dry skin noted at hands, legs, feet and arms.   Psychiatric: His behavior is normal.       Assessment:       1. Generalized body aches    2. Osteoarthritis of multiple joints, unspecified osteoarthritis type    3. Dry skin dermatitis        Plan:     1.  Generalized body aches/osteoarthritis  -Vitals are stable in clinic  -Appears to be arthritic related pain.  -Encouraged patient to continue to daily mobic.  -Will refer to rheumatology for further evaluation and management.  -Will give 6mg IM of betamethasone to help with symptom relief.    2.  Dry skin dermatitis  -Encouraged use of mild unscented soap like dove or dial when bathing.  -Recommended OTC Cetaphil or aveeno moisturizing lotion to help with dry skin.  -Encouraged to increase water intake to help promote better skin hydration.

## 2018-12-06 NOTE — TELEPHONE ENCOUNTER
I spoke to the patient. He is scheduled today with FARHAN Carrillo NP. C/o bilateral knee pain with no injury.

## 2019-01-22 ENCOUNTER — HOSPITAL ENCOUNTER (EMERGENCY)
Facility: HOSPITAL | Age: 84
Discharge: HOME | End: 2019-01-22
Attending: EMERGENCY MEDICINE
Payer: MEDICARE

## 2019-01-22 VITALS
RESPIRATION RATE: 18 BRPM | DIASTOLIC BLOOD PRESSURE: 75 MMHG | HEIGHT: 69 IN | BODY MASS INDEX: 23.7 KG/M2 | OXYGEN SATURATION: 100 % | HEART RATE: 85 BPM | WEIGHT: 160 LBS | TEMPERATURE: 99 F | SYSTOLIC BLOOD PRESSURE: 139 MMHG

## 2019-01-22 DIAGNOSIS — R52 GENERALIZED PAIN: Primary | ICD-10-CM

## 2019-01-22 DIAGNOSIS — G62.9 NEUROPATHY: ICD-10-CM

## 2019-01-22 PROCEDURE — 99283 EMERGENCY DEPT VISIT LOW MDM: CPT | Mod: HCNC

## 2019-01-22 PROCEDURE — 99284 EMERGENCY DEPT VISIT MOD MDM: CPT | Mod: ,,, | Performed by: EMERGENCY MEDICINE

## 2019-01-22 PROCEDURE — 99284 PR EMERGENCY DEPT VISIT,LEVEL IV: ICD-10-PCS | Mod: ,,, | Performed by: EMERGENCY MEDICINE

## 2019-01-22 RX ORDER — GABAPENTIN 100 MG/1
100 CAPSULE ORAL 3 TIMES DAILY
Qty: 30 CAPSULE | Refills: 0 | Status: SHIPPED | OUTPATIENT
Start: 2019-01-22 | End: 2019-02-01

## 2019-01-22 NOTE — ED PROVIDER NOTES
"Encounter Date: 1/22/2019    SCRIBE #1 NOTE: I, Mellissa Ferreira, am scribing for, and in the presence of,  Dr. Valle. I have scribed the entire note.       History     Chief Complaint   Patient presents with    Generalized pain     x 1 week. Unknown fever at home. states "legs, ankles, and arms" when asked for more details of pain.     87 y.o. male with PMHx of OA, hyperlipidemia, hypertension, BPH, OA, dementia, ?neuropathy presents with chief complaint of generalized pain.  Patient endorses 2 weeks of "burning all over".  Pain is constant. No hx of similar pain.  Nothing seems to make it better or worse.  No new medications.  Reports compliance with his meloxicam.  Patient reports no previous issues like this however on chart review he's had hsd multiple prior visits with similar presentations.  Wife assisted with history - she does not feel the patient has been complaining about pain until today.        The history is provided by the patient and medical records.     Review of patient's allergies indicates:  No Known Allergies  Past Medical History:   Diagnosis Date    Amblyopia     refractive amblyopia left eye ()    Arthritis     BPH (benign prostatic hypertrophy)     Colon polyp     Hyperlipidemia     Hypertension     URI (upper respiratory infection)      Past Surgical History:   Procedure Laterality Date    CATARACT EXTRACTION W/  INTRAOCULAR LENS IMPLANT Right n/a    done at [facility] before 2004    CATARACT EXTRACTION W/  INTRAOCULAR LENS IMPLANT Left 11/30/2016    OS     EYE SURGERY      INSERTION-INTRAOCULAR LENS (IOL) Left 11/30/2016    [facility]ST [facility]    PHACOEMULSIFICATION-ASPIRATION-CATARACT Left 11/30/2016    [facility] at [facility] OR 1ST [facility]LR    PROSTATE SURGERY      TRANSURETHRAL RESECTION OF PROSTATE       Family History   Problem Relation Age of Onset    Diabetes Father     Diabetes Paternal Aunt     " Cancer Brother     No Known Problems Mother     Heart disease Neg Hx     Amblyopia Neg Hx     Blindness Neg Hx     Cataracts Neg Hx     Glaucoma Neg Hx     Hypertension Neg Hx     Macular degeneration Neg Hx     Retinal detachment Neg Hx     Strabismus Neg Hx     Stroke Neg Hx     Thyroid disease Neg Hx      Social History     Tobacco Use    Smoking status: Former Smoker     Last attempt to quit: 1987     Years since quittin.3    Smokeless tobacco: Never Used   Substance Use Topics    Alcohol use: No    Drug use: No     Review of Systems   Constitutional: Negative for fever.   HENT: Negative for sore throat.    Respiratory: Negative for shortness of breath.    Cardiovascular: Negative for chest pain.   Gastrointestinal: Negative for nausea.   Genitourinary: Negative for dysuria.   Musculoskeletal: Negative for back pain.        (+) Generalized Pain   Skin: Negative for rash.   Neurological: Negative for weakness.   Hematological: Does not bruise/bleed easily.       Physical Exam     Initial Vitals [19 1458]   BP Pulse Resp Temp SpO2   139/75 85 18 98.8 °F (37.1 °C) 100 %      MAP       --         Physical Exam    Nursing note and vitals reviewed.  Constitutional: He appears well-developed and well-nourished. He is not diaphoretic. No distress.   HENT:   Head: Normocephalic and atraumatic.   Eyes: Conjunctivae and EOM are normal.   Neck: Normal range of motion. Neck supple. No JVD present.   Cardiovascular: Normal rate, regular rhythm, normal heart sounds and intact distal pulses. Exam reveals no gallop and no friction rub.    No murmur heard.  Pulmonary/Chest: Breath sounds normal. No respiratory distress. He has no wheezes. He has no rhonchi. He has no rales. He exhibits no tenderness.   Abdominal: Soft. Bowel sounds are normal. He exhibits no distension and no mass. There is no tenderness. There is no rebound and no guarding.   Musculoskeletal: Normal range of motion. He exhibits no  tenderness.   Lymphadenopathy:     He has no cervical adenopathy.   Neurological: He is alert. He has normal strength. No sensory deficit.   Oriented x2   Skin: Skin is warm and dry. Capillary refill takes less than 2 seconds.   Nothing cellulitic appearing.     Psychiatric: He has a normal mood and affect.         ED Course   Procedures  Labs Reviewed - No data to display       Imaging Results    None          Medical Decision Making:   History:   Old Medical Records: I decided to obtain old medical records.  Initial Assessment:   87 y.o. male with PMHx of OA, hyperlipidemia, hypertension, BPH, OA, dementia, ?neuropathy presents with chief complaint of generalized pain.  ED Management:  Patient with no evidence of cellulitis or dermatitis.  He has good ROM throughout joints. Patient appears well hydrated, does not appear to be rhabdomyolysis.  Generalized aches possible secondary to OA vs neuropathy.  Patient will be provided with a trial of gabapentin. Advised close follow up with PCP.              Scribe Attestation:   Scribe #1: I performed the above scribed service and the documentation accurately describes the services I performed. I attest to the accuracy of the note.    Attending Attestation:           Physician Attestation for Scribe:      Comments: I, Dr. Vance Valle, personally performed the services described in this documentation. All medical record entries made by the scribe were at my direction and in my presence.  I have reviewed the chart and agree that the record reflects my personal performance and is accurate and complete. Vance Valle MD.  5:14 PM 01/22/2019                 Clinical Impression:   The primary encounter diagnosis was Generalized pain. A diagnosis of Neuropathy was also pertinent to this visit.                             Vance Valle MD  01/22/19 5891

## 2019-01-22 NOTE — ED TRIAGE NOTES
"Patient states he is hurting "all over" states aly legs, aly ankles, left shoulder, neck. States his whole body is "burning all over" onset 2 weeks ago. No OTC pain meds.   "

## 2019-01-22 NOTE — ED NOTES
Patient identifiers verified and correct for Mr Walker  C/C: All over gen pain and burning.   APPEARANCE: awake and alert in NAD.  SKIN: warm, dry and intact. No breakdown or bruising.  MUSCULOSKELETAL: Patient moving all extremities spontaneously, no obvious swelling or deformities noted. Ambulates with cane  RESPIRATORY: Denies shortness of breath.Respirations unlabored.   CARDIAC: Denies CP, 2+ distal pulses; no peripheral edema  ABDOMEN: S/ND/NT, Denies nausea  : voids spontaneously, denies difficulty  Neurologic: AAO x 4; follows commands equal strength in all extremities; denies numbness/tingling. Denies dizziness Pascual weakness

## 2019-01-24 ENCOUNTER — OUTPATIENT CASE MANAGEMENT (OUTPATIENT)
Dept: ADMINISTRATIVE | Facility: OTHER | Age: 84
End: 2019-01-24

## 2019-01-24 NOTE — PROGRESS NOTES
The following patient has been assigned to Cat Rowe RN with Outpatient Complex Care Management for high risk screening.    Reason: High Risk : Recently discharged Report    Please contact [facility] at [phone] with any questions.    Thank you,    Ghislaine Ramirez, [facility]  [phone]

## 2019-01-24 NOTE — PROGRESS NOTES
01/24/19-SUMMARY-  Called and spoke to Barbara, granddaughter of patient. She asked to be called back later today. Called granddaughter's phone number back times 2 and unable to leave a message. 1' st attempt to screen patient.

## 2019-01-28 ENCOUNTER — OUTPATIENT CASE MANAGEMENT (OUTPATIENT)
Dept: ADMINISTRATIVE | Facility: OTHER | Age: 84
End: 2019-01-28

## 2019-01-28 NOTE — PROGRESS NOTES
01/28/19-Called and spoke to patient's wife, Karime. Patient is not home at this time. Explained program to wife. Left my contact information for patient to call me back. 2'nd attempt to screen patient.

## 2019-01-31 ENCOUNTER — OUTPATIENT CASE MANAGEMENT (OUTPATIENT)
Dept: ADMINISTRATIVE | Facility: OTHER | Age: 84
End: 2019-01-31

## 2019-01-31 NOTE — PROGRESS NOTES
01/31/18-SUMMARY-  Called and spoke to Kevyn Leach, 87 year old man. Past Medical Hx of  hyperlipidemia, hypertension, GERD, and cervical spine arthritis who presented to the ER on 1/24/19  with chief complaint of generalized pain. Patient was discharged home with gabapentin 100 mg tid for 10 days. Patient has an appointment with rheumatology on 02/01/19 at 2:40 and his granddaughter, Shyanne is taking him to his appointment. Patient's pain in a 6 today. Patient takes tylenol and his medications for pain. Patient feels like the cold makes his pain worse. Patient lives with his wife, Karime and his granddaughter assists with needs. Patient is independent with his ADL's. Patient uses a cane. Patient is hard of hearing. Patient does not have a blood pressure cuff to check his blood pressure. Reviewed with patient taking medications for pain and proper nutrition. Continue to educate about pain and arthritis. Encourage patient to follow medication and treatment regiment. Encourage patient to maintain follow up with doctors.     INTERVENTIONS-  Called Brenda granddaughter for Med Rec and unable to leave a message.   Refer to [facility] for advance directive and support at home    PLAN-  Follow up in two weeks  Call Brenda for Med Rec   Mail educational material on pain and arthritis        Todays [facility] Self-Management Care Plan was developed with the patients/caregivers input and was based on identified barriers from todays assessment.  Goals were written today with the patient/caregiver and the patient has agreed to work towards these goals to improve his/her overall well-being. Patient verbalized understanding of the care plan, goals, and all of today's instructions. Encouraged patient/caregiver to communicate with his/her physician and health care team about health conditions and the treatment plan.  Provided my contact information today and encouraged patient/caregiver to call me with any questions as needed.

## 2019-02-01 ENCOUNTER — OFFICE VISIT (OUTPATIENT)
Dept: RHEUMATOLOGY | Facility: CLINIC | Age: 84
End: 2019-02-01
Payer: MEDICARE

## 2019-02-01 ENCOUNTER — OUTPATIENT CASE MANAGEMENT (OUTPATIENT)
Dept: ADMINISTRATIVE | Facility: OTHER | Age: 84
End: 2019-02-01

## 2019-02-01 VITALS
BODY MASS INDEX: 23.28 KG/M2 | WEIGHT: 157.19 LBS | SYSTOLIC BLOOD PRESSURE: 138 MMHG | HEART RATE: 65 BPM | HEIGHT: 69 IN | DIASTOLIC BLOOD PRESSURE: 70 MMHG

## 2019-02-01 DIAGNOSIS — M47.812 SPONDYLOSIS OF CERVICAL REGION WITHOUT MYELOPATHY OR RADICULOPATHY: Primary | ICD-10-CM

## 2019-02-01 DIAGNOSIS — M15.9 PRIMARY OSTEOARTHRITIS INVOLVING MULTIPLE JOINTS: ICD-10-CM

## 2019-02-01 DIAGNOSIS — M47.816 SPONDYLOSIS OF LUMBAR REGION WITHOUT MYELOPATHY OR RADICULOPATHY: ICD-10-CM

## 2019-02-01 PROCEDURE — 99204 OFFICE O/P NEW MOD 45 MIN: CPT | Mod: HCNC,S$GLB,, | Performed by: INTERNAL MEDICINE

## 2019-02-01 PROCEDURE — 99999 PR PBB SHADOW E&M-EST. PATIENT-LVL III: ICD-10-PCS | Mod: PBBFAC,HCNC,, | Performed by: INTERNAL MEDICINE

## 2019-02-01 PROCEDURE — 99999 PR PBB SHADOW E&M-EST. PATIENT-LVL III: CPT | Mod: PBBFAC,HCNC,, | Performed by: INTERNAL MEDICINE

## 2019-02-01 PROCEDURE — 1101F PT FALLS ASSESS-DOCD LE1/YR: CPT | Mod: HCNC,CPTII,S$GLB, | Performed by: INTERNAL MEDICINE

## 2019-02-01 PROCEDURE — 99204 PR OFFICE/OUTPT VISIT, NEW, LEVL IV, 45-59 MIN: ICD-10-PCS | Mod: HCNC,S$GLB,, | Performed by: INTERNAL MEDICINE

## 2019-02-01 PROCEDURE — 1101F PR PT FALLS ASSESS DOC 0-1 FALLS W/OUT INJ PAST YR: ICD-10-PCS | Mod: HCNC,CPTII,S$GLB, | Performed by: INTERNAL MEDICINE

## 2019-02-01 RX ORDER — MELOXICAM 7.5 MG/1
7.5 TABLET ORAL DAILY
Qty: 30 TABLET | Refills: 3 | Status: SHIPPED | OUTPATIENT
Start: 2019-02-01 | End: 2019-03-03

## 2019-02-01 RX ORDER — GABAPENTIN 100 MG/1
100 CAPSULE ORAL DAILY
Qty: 30 CAPSULE | Refills: 3 | Status: SHIPPED | OUTPATIENT
Start: 2019-02-01 | End: 2019-04-03

## 2019-02-01 ASSESSMENT — ROUTINE ASSESSMENT OF PATIENT INDEX DATA (RAPID3)
MDHAQ FUNCTION SCORE: 1.4
PSYCHOLOGICAL DISTRESS SCORE: 3.3
TOTAL RAPID3 SCORE: 4.89
FATIGUE SCORE: 5
PAIN SCORE: 5
AM STIFFNESS SCORE: 1, YES
PATIENT GLOBAL ASSESSMENT SCORE: 5

## 2019-02-01 NOTE — LETTER
February 1, 2019      Mookie Carrillo, NP  [address]  [place] [address]           [facility] - Rheumatology  [address]  [place] [address]  Phone: [phone]  Fax: [phone]          Patient: Kevyn Leach   MR Number: [id number]   YOB: 1931   Date of Visit: 2/1/2019       Dear Mookie Carrillo:    Thank you for referring Kevyn Leach to me for evaluation. Attached you will find relevant portions of my assessment and plan of care.    If you have questions, please do not hesitate to call me. I look forward to following Kevyn Leach along with you.    Sincerely,    Eliu Solano MD    Enclosure  CC:  No Recipients    If you would like to receive this communication electronically, please contact [email or web address] or [phone] to request more information on [employer] access.    For providers and/or their staff who would like to refer a patient to [facility], please contact us through our one-stop-shop provider referral line, [facility], at [phone].    If you feel you have received this communication in error or would no longer like to receive these types of communications, please e-mail [email or web address]

## 2019-02-01 NOTE — PROGRESS NOTES
Summary:  1st Attempt to complete Social Work Assessment for Outpatient Care Management; Spouse answered home phone and stated pt was not there; left contact info and requested pt return call.  Attempted to contact alternate number but no answer and voicemail indicated it was not pt.  LCSW will reattempt at a later date.      Intervention:  Message left for pt to call    Plan:  Attempt assessment 2/4/19

## 2019-02-01 NOTE — PROGRESS NOTES
Chief Complaint   Patient presents with    Disease Management    Pain       Patient was referred by Mookie Carrillo    History of presenting illness    87 year old black male has pain     All over the body    He has burning all over  He feels weak    Legs and arms hurt    Knees hurt at times  Legs give out    Good and bad days+    Neurology,cardiology has seen him    He used to walk a lot  He doesn't walk much  He does some things in the house  He walks around the yard    Once he has started limiting his activities he started to feel better    Over 30 years he has limited his activities    He is on meloxicam 15 mg and gabapentin 100 mg tid  He feels better    Labs     TSH nml  CMP,CBC nml  Ck nml  Phos,mag nml  RF neg  SWAPNA pos subsets negative  1: 160  CRP nml    Lumbar spine deg changes  Cervical spine deg changes   Knees deg changes     Past history : HTN,HLD,BPH  dementia    Family history : none relevant     Social history : former smoker,quit 1987      Review of Systems   Constitutional: Negative for activity change, appetite change, chills, diaphoresis, fatigue, fever and unexpected weight change.   HENT: Negative for congestion, dental problem, drooling, ear discharge, ear pain, facial swelling, hearing loss, mouth sores, nosebleeds, postnasal drip, rhinorrhea, sinus pressure, sinus pain, sneezing, sore throat, tinnitus, trouble swallowing and voice change.    Eyes: Negative for photophobia, pain, discharge, redness, itching and visual disturbance.   Respiratory: Negative for apnea, cough, choking, chest tightness, shortness of breath, wheezing and stridor.    Cardiovascular: Negative for chest pain, palpitations and leg swelling.   Gastrointestinal: Negative for abdominal distention, abdominal pain, anal bleeding, blood in stool, constipation, diarrhea, nausea, rectal pain and vomiting.   Endocrine: Negative for cold intolerance, heat intolerance, polydipsia, polyphagia and polyuria.   Genitourinary: Negative  for decreased urine volume, difficulty urinating, dysuria, enuresis, flank pain, frequency, genital sores, hematuria and urgency.   Musculoskeletal: Positive for arthralgias. Negative for back pain, gait problem, joint swelling, myalgias, neck pain and neck stiffness.   Skin: Negative for color change, pallor, rash and wound.   Allergic/Immunologic: Negative for environmental allergies, food allergies and immunocompromised state.   Neurological: Negative for dizziness, tremors, seizures, syncope, facial asymmetry, speech difficulty, weakness, light-headedness, numbness and headaches.   Hematological: Negative for adenopathy. Does not bruise/bleed easily.   Psychiatric/Behavioral: Negative for agitation, behavioral problems, confusion, decreased concentration, dysphoric mood, hallucinations, self-injury, sleep disturbance and suicidal ideas. The patient is not nervous/anxious and is not hyperactive.      Physical Exam     ALMARAZ-28 tender joint count: 0  ALMARAZ-28 swollen joint count: 0    Physical Exam   Constitutional: He is oriented to person, place, and time and well-developed, well-nourished, and in no distress. No distress.   HENT:   Head: Normocephalic.   Mouth/Throat: Oropharynx is clear and moist.   Eyes: Conjunctivae are normal. Pupils are equal, round, and reactive to light. Right eye exhibits no discharge. Left eye exhibits no discharge. No scleral icterus.   Neck: Normal range of motion. No thyromegaly present.   Cardiovascular: Normal rate, regular rhythm, normal heart sounds and intact distal pulses.    Pulmonary/Chest: Effort normal and breath sounds normal. No stridor.   Abdominal: Soft. Bowel sounds are normal.   Lymphadenopathy:     He has no cervical adenopathy.   Neurological: He is alert and oriented to person, place, and time.   Skin: Skin is warm. No rash noted. He is not diaphoretic.     Psychiatric: Affect and judgment normal.   Musculoskeletal: Normal range of motion.         Assessment     87  year old black male with  HTN,HLD,BPH  Dementia    He has leg and arm pain and weakness    He has really great quality of life,he can cut the grass and wash his car  He doesn't do much these days  So the pain is not an issue anymore    He feels weak but he can get up without support  He has great strength  He has normal joint exam,normal ROM in most joints    He has osteoarthritis in the neck and low back      1. Spondylosis of cervical region without myelopathy or radiculopathy    2. Spondylosis of lumbar region without myelopathy or radiculopathy    3. Primary osteoarthritis involving multiple joints            New problem     Plan    Suggest that he should not worry    Some degree of aches and pains is ok with his ageing joints    meloxicam and gabapentin help him  Suggested 7.5 mg meloxicam and gabapentin 100 mg daily should be enough    Kevyn was seen today for disease management and pain.    Diagnoses and all orders for this visit:    Spondylosis of cervical region without myelopathy or radiculopathy    Spondylosis of lumbar region without myelopathy or radiculopathy    Primary osteoarthritis involving multiple joints    Other orders  -     gabapentin (NEURONTIN) 100 MG capsule; Take 1 capsule (100 mg total) by mouth once daily.  -     meloxicam (MOBIC) 7.5 MG tablet; Take 1 tablet (7.5 mg total) by mouth once daily.            No need for extensive labs and xrays    Just rtn prn

## 2019-02-04 ENCOUNTER — OUTPATIENT CASE MANAGEMENT (OUTPATIENT)
Dept: ADMINISTRATIVE | Facility: OTHER | Age: 84
End: 2019-02-04

## 2019-02-04 NOTE — PROGRESS NOTES
Summary:  Partial assessment completed today; patient was tired and requested a call back in the afternoon.  Called pt back as requested, however he was not home.  Plan to continue assessment at next contact.    Interventions:  Chart review  Partial assessmnet    Plan:  Continue assessment 2/7/19      Todays [facility] Self-Management Care Plan was developed with the patients/caregivers input and was based on identified barriers from todays assessment.  Goals were written today with the patient/caregiver and the patient has agreed to work towards these goals to improve his/her overall well-being. Patient verbalized understanding of the care plan, goals, and all of today's instructions. Encouraged patient/caregiver to communicate with his/her physician and health care team about health conditions and the treatment plan.  Provided my contact information today and encouraged patient/caregiver to call me with any questions as needed.

## 2019-02-07 ENCOUNTER — OUTPATIENT CASE MANAGEMENT (OUTPATIENT)
Dept: ADMINISTRATIVE | Facility: OTHER | Age: 84
End: 2019-02-07

## 2019-02-07 NOTE — PROGRESS NOTES
Summary:  Partial assessment completed with pt on 2/4/19.  1st Attempt to complete Social Work Assessment for Outpatient Care Management; left message requesting a return call.  LCSW will reattempt at a later date.       Interventions:  Message left for pt to call    Plan:  Continue assessment 2/12/19      Todays [facility] Self-Management Care Plan was developed with the patients/caregivers input and was based on identified barriers from todays assessment.  Goals were written today with the patient/caregiver and the patient has agreed to work towards these goals to improve his/her overall well-being. Patient verbalized understanding of the care plan, goals, and all of today's instructions. Encouraged patient/caregiver to communicate with his/her physician and health care team about health conditions and the treatment plan.  Provided my contact information today and encouraged patient/caregiver to call me with any questions as needed.

## 2019-02-12 ENCOUNTER — OUTPATIENT CASE MANAGEMENT (OUTPATIENT)
Dept: ADMINISTRATIVE | Facility: OTHER | Age: 84
End: 2019-02-12

## 2019-02-12 NOTE — PROGRESS NOTES
Summary:  2nd attempt to complete Social Work Assessment for O[facility]; left message requesting a return call. Partial assessment done on 2/7/19.  LCSW will mail a letter with contact information requesting a return call.  [facility] RN notified.      Interventions:  Message left for pt to call  Mailed letter to pt requesting return call  Notified [facility] RN of above    Plan:  Continue assessment at next contact 2/21/19  If unable to reach close case

## 2019-02-14 ENCOUNTER — OUTPATIENT CASE MANAGEMENT (OUTPATIENT)
Dept: ADMINISTRATIVE | Facility: OTHER | Age: 84
End: 2019-02-14

## 2019-02-14 NOTE — PROGRESS NOTES
02/14/19-SUMMARY-  Attempted to follow up with  patient for outpatient case management. No answer. Left messages requesting call back. Called and spoke to fredrick Jimenez who will call me back at another time.Barbara not able to go over medications at this time.  1'st attempt to follow up with patient/caregiver.     INTERVENTIONS-  Called Brenda fredrick for Med Rec       PLAN-  Follow up next week   Call Brenda for Med Rec   Mail educational material on pain and arthritis        Today[facility] Self-Management Care Plan was developed with the patients/caregivers input and was based on identified barriers from todays assessment.  Goals were written today with the patient/caregiver and the patient has agreed to work towards these goals to improve his/her overall well-being. Patient verbalized understanding of the care plan, goals, and all of today's instructions. Encouraged patient/caregiver to communicate with his/her physician and health care team about health conditions and the treatment plan.  Provided my contact information today and encouraged patient/caregiver to call me with any questions as needed.

## 2019-02-20 ENCOUNTER — OUTPATIENT CASE MANAGEMENT (OUTPATIENT)
Dept: ADMINISTRATIVE | Facility: OTHER | Age: 84
End: 2019-02-20

## 2019-02-20 NOTE — PROGRESS NOTES
Summary:  LCSW mailed letter after 2nd attempt to complete SW Assessment with contact information requesting a return call and pt has not reached out to this LCSW.  Final attempt made today to reach pt by phone.  No answer but left message for pt to call should any needs arise in the future.  LCSW will close case and notified [facility] RN.    Intervention:  Message left for pt   Notified [facility] RN of case closure    Plan:  Close case

## 2019-02-21 ENCOUNTER — OUTPATIENT CASE MANAGEMENT (OUTPATIENT)
Dept: ADMINISTRATIVE | Facility: OTHER | Age: 84
End: 2019-02-21

## 2019-02-21 NOTE — PROGRESS NOTES
02/21/19-SUMMARY-  Called and spoke to patient. Patient is taking all his medications for pain and using OTC cream at night. Patient does not exercise but is physically active for his age.Patient with no new medications for his pain. Patient is taking all his medications. Patient uses ice or heat if needed for pain. Patient is eating a good diet. KASH Mcleod has called patient for services that he may obtain but had to close case because she was unable to reach him.  Reviewed with patient taking medications for pain and proper nutrition. Continue to educate about pain and arthritis. Encourage patient to follow medication and treatment regiment. Encourage patient to maintain follow up with doctors.     INTERVENTIONS-  Called Brenda granddaughter for Med Rec and unable to leave a message, mail box is full   Update KASH Mcleod     PLAN-  Follow up in two weeks  Call Brenda for Med Rec   Continue review of educational material on  pain and arthritis        Today[facility] Self-Management Care Plan was developed with the patients/caregivers input and was based on identified barriers from todays assessment.  Goals were written today with the patient/caregiver and the patient has agreed to work towards these goals to improve his/her overall well-being. Patient verbalized understanding of the care plan, goals, and all of today's instructions. Encouraged patient/caregiver to communicate with his/her physician and health care team about health conditions and the treatment plan.  Provided my contact information today and encouraged patient/caregiver to call me with any questions as needed.

## 2019-03-06 ENCOUNTER — OUTPATIENT CASE MANAGEMENT (OUTPATIENT)
Dept: ADMINISTRATIVE | Facility: OTHER | Age: 84
End: 2019-03-06

## 2019-03-06 NOTE — PROGRESS NOTES
03/06/19-SUMMARY-  Attempted to follow up with patient for outpatient case management. No answer. Left message requesting call back. Called daughterBarbara and unable to leave a voice mail. 1'st attempt to follow up with patient.     INTERVENTIONS-  Called Brenda granddaughter for Med Rec and unable to leave a message, mail box is full   Updated KASH Mcleod who closed case because she was unable to contact patient.     PLAN-  Follow up next week   Call Brenda for Med Rec   Continue review of educational material on  pain and arthritis

## 2019-03-08 ENCOUNTER — TELEPHONE (OUTPATIENT)
Dept: INTERNAL MEDICINE | Facility: CLINIC | Age: 84
End: 2019-03-08

## 2019-03-08 NOTE — TELEPHONE ENCOUNTER
Recommend one of both of the following (OTC)  flonase 1 spray qhs  &/of  Zyrtec 10mg dialy    Both of the above are available in generic ([facility]er)

## 2019-03-08 NOTE — TELEPHONE ENCOUNTER
I tried calling Carmela who called for the patient. She did not answer the phone and her voice mailbox is full and cannot except messages.

## 2019-03-08 NOTE — TELEPHONE ENCOUNTER
----- Message from Lilian Briseno sent at 3/8/2019 11:00 AM CST -----  Contact: Carmela [phone] - Granddaughter      ----- Message -----  From: Alecia Rosales  Sent: 3/8/2019  10:46 AM  To: Ameena Singh Staff    Patient is requesting something for a runny nose. Has had symptoms for sevaral months.  Please call and advise  Thank you

## 2019-03-12 ENCOUNTER — OUTPATIENT CASE MANAGEMENT (OUTPATIENT)
Dept: ADMINISTRATIVE | Facility: OTHER | Age: 84
End: 2019-03-12

## 2019-03-12 ENCOUNTER — TELEPHONE (OUTPATIENT)
Dept: INTERNAL MEDICINE | Facility: CLINIC | Age: 84
End: 2019-03-12

## 2019-03-12 NOTE — TELEPHONE ENCOUNTER
Spoke with patient, attempted to schedule with Scott. Patient will call back when he is sure that someone can bring him in.

## 2019-03-12 NOTE — TELEPHONE ENCOUNTER
----- Message from Miguel Angel Hatch sent at 3/11/2019  5:29 PM CDT -----  Contact: Via [employer]   Type:  Sooner Apoointment Request    (Via [facility])  When is the first available appointment? 6\26\19  Symptoms:Continuous Nasal drip.  Would the patient rather a call back or a response via [facility]? [facility]   Best Call Back Number:[phone]  Additional Information: sent via [facility] Portal pt would like an appointment for nasal drip, cough

## 2019-03-12 NOTE — LETTER
March 12, 2019    Kevyn Leach  [address] [place] [address]             [facility] [address] [address] Dear Mr. Leach:    I work with [facility]s Outpatient Case Management Department. I recently tried to contact you to see how you have been doing. I was unable to reach you. If you would like to discuss any concerns with your health maintenance or progress or services, you may call Outpatient Case Management. My phone number is [phone].     The Outpatient Case Management Department can be reached at [phone] from 8:00 am to 4:30 pm on Monday thru Friday. [facility] also has a program where a nurse is available 24/7 to answer questions or provide medical advice, their phone number is      [phone].    Sincerely,       Cat Rowe RN [facility]  Outpatient Complex Care Management    [phone]

## 2019-03-12 NOTE — PROGRESS NOTES
03/12/19-SUMMARY-  Attempted to follow up with  patient for outpatient case management. No answer. Left message requesting call back. 2'nd attempt to follow up with patient. Will mail letter to patient.     INTERVENTIONS-  Attempted follow up   Letter mailed     PLAN-  Follow up   Call Brenda for Med Rec - mail box full   Continue review of educational material on  pain and arthritis        Todays [facility] Self-Management Care Plan was developed with the patients/caregivers input and was based on identified barriers from todays assessment.  Goals were written today with the patient/caregiver and the patient has agreed to work towards these goals to improve his/her overall well-being. Patient verbalized understanding of the care plan, goals, and all of today's instructions. Encouraged patient/caregiver to communicate with his/her physician and health care team about health conditions and the treatment plan.  Provided my contact information today and encouraged patient/caregiver to call me with any questions as needed.

## 2019-03-19 ENCOUNTER — OUTPATIENT CASE MANAGEMENT (OUTPATIENT)
Dept: ADMINISTRATIVE | Facility: OTHER | Age: 84
End: 2019-03-19

## 2019-03-19 NOTE — PROGRESS NOTES
03/19/19-SUMMARY-  Attempted to follow up with  Patient/caregiver for outpatient case management. No answer. Left message requesting call back. Called Tracitell, granddaughter and unable to leave a voice mail. Third attempt to follow up with patient/cargiver. Will close case and dis-enroll patient from [facility].        INTERVENTIONS-  Case Closed     PLAN-  Case Closed

## 2019-03-26 ENCOUNTER — HOSPITAL ENCOUNTER (OUTPATIENT)
Facility: HOSPITAL | Age: 84
Discharge: HOME HEALTH | End: 2019-03-28
Attending: EMERGENCY MEDICINE | Admitting: HOSPITALIST
Payer: MEDICARE

## 2019-03-26 DIAGNOSIS — R41.82 ALTERED MENTAL STATUS: Primary | ICD-10-CM

## 2019-03-26 DIAGNOSIS — R07.9 CHEST PAIN, UNSPECIFIED TYPE: ICD-10-CM

## 2019-03-26 LAB
ALBUMIN SERPL BCP-MCNC: 3.7 G/DL (ref 3.5–5.2)
ALP SERPL-CCNC: 48 U/L (ref 55–135)
ALT SERPL W/O P-5'-P-CCNC: 9 U/L (ref 10–44)
ANION GAP SERPL CALC-SCNC: 11 MMOL/L (ref 8–16)
AST SERPL-CCNC: 15 U/L (ref 10–40)
BASOPHILS # BLD AUTO: 0.03 K/UL (ref 0–0.2)
BASOPHILS NFR BLD: 0.6 % (ref 0–1.9)
BILIRUB SERPL-MCNC: 0.4 MG/DL (ref 0.1–1)
BILIRUB UR QL STRIP: NEGATIVE
BNP SERPL-MCNC: 63 PG/ML (ref 0–99)
BUN SERPL-MCNC: 21 MG/DL (ref 8–23)
BUN SERPL-MCNC: 22 MG/DL (ref 6–30)
CALCIUM SERPL-MCNC: 9.6 MG/DL (ref 8.7–10.5)
CHLORIDE SERPL-SCNC: 100 MMOL/L (ref 95–110)
CHLORIDE SERPL-SCNC: 102 MMOL/L (ref 95–110)
CLARITY UR REFRACT.AUTO: CLEAR
CO2 SERPL-SCNC: 26 MMOL/L (ref 23–29)
COLOR UR AUTO: YELLOW
CREAT SERPL-MCNC: 1.4 MG/DL (ref 0.5–1.4)
CREAT SERPL-MCNC: 1.5 MG/DL (ref 0.5–1.4)
CTP QC/QA: YES
DIFFERENTIAL METHOD: ABNORMAL
EOSINOPHIL # BLD AUTO: 0.1 K/UL (ref 0–0.5)
EOSINOPHIL NFR BLD: 2 % (ref 0–8)
ERYTHROCYTE [DISTWIDTH] IN BLOOD BY AUTOMATED COUNT: 13.5 % (ref 11.5–14.5)
EST. GFR  (AFRICAN AMERICAN): 47.7 ML/MIN/1.73 M^2
EST. GFR  (NON AFRICAN AMERICAN): 41.3 ML/MIN/1.73 M^2
GLUCOSE SERPL-MCNC: 80 MG/DL (ref 70–110)
GLUCOSE SERPL-MCNC: 82 MG/DL (ref 70–110)
GLUCOSE UR QL STRIP: NEGATIVE
HCT VFR BLD AUTO: 41.6 % (ref 40–54)
HCT VFR BLD CALC: 39 %PCV (ref 36–54)
HGB BLD-MCNC: 13 G/DL (ref 14–18)
HGB UR QL STRIP: NEGATIVE
IMM GRANULOCYTES # BLD AUTO: 0.01 K/UL (ref 0–0.04)
IMM GRANULOCYTES NFR BLD AUTO: 0.2 % (ref 0–0.5)
KETONES UR QL STRIP: NEGATIVE
LEUKOCYTE ESTERASE UR QL STRIP: NEGATIVE
LYMPHOCYTES # BLD AUTO: 1.8 K/UL (ref 1–4.8)
LYMPHOCYTES NFR BLD: 33.4 % (ref 18–48)
MAGNESIUM SERPL-MCNC: 2 MG/DL (ref 1.6–2.6)
MCH RBC QN AUTO: 28.4 PG (ref 27–31)
MCHC RBC AUTO-ENTMCNC: 31.3 G/DL (ref 32–36)
MCV RBC AUTO: 91 FL (ref 82–98)
MONOCYTES # BLD AUTO: 0.7 K/UL (ref 0.3–1)
MONOCYTES NFR BLD: 13.4 % (ref 4–15)
NEUTROPHILS # BLD AUTO: 2.8 K/UL (ref 1.8–7.7)
NEUTROPHILS NFR BLD: 50.4 % (ref 38–73)
NITRITE UR QL STRIP: NEGATIVE
NRBC BLD-RTO: 0 /100 WBC
PH UR STRIP: 5 [PH] (ref 5–8)
PHOSPHATE SERPL-MCNC: 3.5 MG/DL (ref 2.7–4.5)
PLATELET # BLD AUTO: 227 K/UL (ref 150–350)
PMV BLD AUTO: 9.8 FL (ref 9.2–12.9)
POC IONIZED CALCIUM: 1.12 MMOL/L (ref 1.06–1.42)
POC MOLECULAR INFLUENZA A AGN: NEGATIVE
POC MOLECULAR INFLUENZA B AGN: NEGATIVE
POC TCO2 (MEASURED): 28 MMOL/L (ref 23–29)
POTASSIUM BLD-SCNC: 3.3 MMOL/L (ref 3.5–5.1)
POTASSIUM SERPL-SCNC: 3.5 MMOL/L (ref 3.5–5.1)
PROT SERPL-MCNC: 7.9 G/DL (ref 6–8.4)
PROT UR QL STRIP: NEGATIVE
RBC # BLD AUTO: 4.58 M/UL (ref 4.6–6.2)
SAMPLE: ABNORMAL
SODIUM BLD-SCNC: 141 MMOL/L (ref 136–145)
SODIUM SERPL-SCNC: 139 MMOL/L (ref 136–145)
SP GR UR STRIP: 1.01 (ref 1–1.03)
TROPONIN I SERPL DL<=0.01 NG/ML-MCNC: 0.01 NG/ML (ref 0–0.03)
URN SPEC COLLECT METH UR: NORMAL
WBC # BLD AUTO: 5.45 K/UL (ref 3.9–12.7)

## 2019-03-26 PROCEDURE — 84100 ASSAY OF PHOSPHORUS: CPT | Mod: HCNC

## 2019-03-26 PROCEDURE — 80053 COMPREHEN METABOLIC PANEL: CPT | Mod: HCNC

## 2019-03-26 PROCEDURE — 25000003 PHARM REV CODE 250: Mod: HCNC | Performed by: PHYSICIAN ASSISTANT

## 2019-03-26 PROCEDURE — 85025 COMPLETE CBC W/AUTO DIFF WBC: CPT | Mod: HCNC

## 2019-03-26 PROCEDURE — 80307 DRUG TEST PRSMV CHEM ANLYZR: CPT | Mod: HCNC

## 2019-03-26 PROCEDURE — 96360 HYDRATION IV INFUSION INIT: CPT | Mod: HCNC

## 2019-03-26 PROCEDURE — 83880 ASSAY OF NATRIURETIC PEPTIDE: CPT | Mod: HCNC

## 2019-03-26 PROCEDURE — 83735 ASSAY OF MAGNESIUM: CPT | Mod: HCNC

## 2019-03-26 PROCEDURE — 93005 ELECTROCARDIOGRAM TRACING: CPT | Mod: HCNC

## 2019-03-26 PROCEDURE — 99285 EMERGENCY DEPT VISIT HI MDM: CPT | Mod: 25,HCNC

## 2019-03-26 PROCEDURE — 96361 HYDRATE IV INFUSION ADD-ON: CPT | Mod: HCNC

## 2019-03-26 PROCEDURE — 93010 EKG 12-LEAD: ICD-10-PCS | Mod: HCNC,,, | Performed by: INTERNAL MEDICINE

## 2019-03-26 PROCEDURE — 99285 EMERGENCY DEPT VISIT HI MDM: CPT | Mod: ,,, | Performed by: PHYSICIAN ASSISTANT

## 2019-03-26 PROCEDURE — 87502 INFLUENZA DNA AMP PROBE: CPT | Mod: HCNC

## 2019-03-26 PROCEDURE — 99285 PR EMERGENCY DEPT VISIT,LEVEL V: ICD-10-PCS | Mod: ,,, | Performed by: PHYSICIAN ASSISTANT

## 2019-03-26 PROCEDURE — 84484 ASSAY OF TROPONIN QUANT: CPT | Mod: HCNC

## 2019-03-26 PROCEDURE — 93010 ELECTROCARDIOGRAM REPORT: CPT | Mod: HCNC,,, | Performed by: INTERNAL MEDICINE

## 2019-03-26 PROCEDURE — 81003 URINALYSIS AUTO W/O SCOPE: CPT | Mod: HCNC,59

## 2019-03-26 RX ORDER — TRAMADOL HYDROCHLORIDE 50 MG/1
50 TABLET ORAL EVERY 6 HOURS PRN
COMMUNITY
End: 2019-04-03

## 2019-03-26 RX ADMIN — SODIUM CHLORIDE 1000 ML: 0.9 INJECTION, SOLUTION INTRAVENOUS at 09:03

## 2019-03-26 RX ADMIN — ALUMINUM HYDROXIDE, MAGNESIUM HYDROXIDE, AND SIMETHICONE 50 ML: 200; 200; 20 SUSPENSION ORAL at 07:03

## 2019-03-26 NOTE — ED PROVIDER NOTES
"Encounter Date: 3/26/2019    SCRIBE #1 NOTE: I, Nelia Zac, am scribing for, and in the presence of,  Dr. Gresham. I have scribed the following portions of the note - the APC attestation.       History     Chief Complaint   Patient presents with    Multiple Complaints     hx neuropathy, hurting in both my legs, back and my whole body     87-year-old with PMHx of HLP, HTN, and arthritis who presents to the ED with c/o AMS. Pt is being cared for by his granddaughter, who noticed this AM that he was "incoherent," which she described as not acting himself and more confused than usual. Per pt's daughter, his AMS has been persistent throughout the day. She also noticed that he has had a decreased appetite for the past few days. Pt states that this morning after eating two breakfast sausages, he developed a burning/pressure pain over his left sided chest with globus sensation. His pain has been intermittent since. He also reports "hurting all over." Denies any known alleviating or exacerbating features to his pain. Pt denies fevers, chills, cough, SOB, numbness, weakness, dysuria, abdominal pain, n/v, or any other medical complaints.     The history is provided by the patient.     Review of patient's allergies indicates:  No Known Allergies  Past Medical History:   Diagnosis Date    Amblyopia     refractive amblyopia left eye ()    Arthritis     BPH (benign prostatic hypertrophy)     Colon polyp     Generalized pain     Hyperlipidemia     Hypertension     URI (upper respiratory infection)      Past Surgical History:   Procedure Laterality Date    CATARACT EXTRACTION W/  INTRAOCULAR LENS IMPLANT Right n/a    done at [facility] before 2004    CATARACT EXTRACTION W/  INTRAOCULAR LENS IMPLANT Left 11/30/2016    OS     EYE SURGERY      INSERTION-INTRAOCULAR LENS (IOL) Left 11/30/2016    [facility]    PHACOEMULSIFICATION-ASPIRATION-CATARACT Left " 2016    [facility] at [facility] [address] [place] [address]    PROSTATE SURGERY      TRANSURETHRAL RESECTION OF PROSTATE       Family History   Problem Relation Age of Onset    Diabetes Father     Diabetes Paternal Aunt     Cancer Brother     No Known Problems Mother     Heart disease Neg Hx     Amblyopia Neg Hx     Blindness Neg Hx     Cataracts Neg Hx     Glaucoma Neg Hx     Hypertension Neg Hx     Macular degeneration Neg Hx     Retinal detachment Neg Hx     Strabismus Neg Hx     Stroke Neg Hx     Thyroid disease Neg Hx      Social History     Tobacco Use    Smoking status: Former Smoker     Last attempt to quit: 1987     Years since quittin.5    Smokeless tobacco: Never Used   Substance Use Topics    Alcohol use: No    Drug use: No     Review of Systems   Constitutional: Positive for appetite change (decreased). Negative for chills, fatigue and fever.   HENT: Negative for congestion and sore throat.    Eyes: Negative for pain and visual disturbance.   Respiratory: Negative for cough and shortness of breath.    Cardiovascular: Positive for chest pain. Negative for palpitations and leg swelling.   Gastrointestinal: Negative for abdominal pain, diarrhea, nausea and vomiting.   Genitourinary: Negative for dysuria, flank pain and hematuria.   Musculoskeletal: Positive for arthralgias. Negative for back pain and neck pain.   Skin: Negative for rash.   Neurological: Negative for dizziness, weakness, numbness and headaches.        +AMS   Hematological: Does not bruise/bleed easily.   Psychiatric/Behavioral: Positive for confusion.       Physical Exam     Initial Vitals [19 1816]   BP Pulse Resp Temp SpO2   (!) 149/70 66 18 98.2 °F (36.8 °C) 99 %      MAP       --         Physical Exam    Nursing note and vitals reviewed.  Constitutional: He appears well-developed and well-nourished. No distress.   HENT:   Head: Normocephalic and atraumatic.   Eyes: Conjunctivae and EOM are  normal.   Neck: Normal range of motion. Neck supple.   Cardiovascular: Normal rate, regular rhythm and normal heart sounds.   Pulmonary/Chest: Breath sounds normal. He has no wheezes. He has no rhonchi. He has no rales. He exhibits no tenderness.   Abdominal: Soft. There is no tenderness. There is no rebound and no guarding.   Musculoskeletal: Normal range of motion. He exhibits no edema or tenderness.   Neurological: He is alert.   Decreased strength throughout. Oriented to place, but not to time or person, which daughter states is not his baseline. Sensation intact and symmetric throughout upper and lower extremities.    Skin: Skin is warm. Capillary refill takes less than 2 seconds.   Psychiatric: He has a normal mood and affect.         ED Course   Procedures  Labs Reviewed   CBC W/ AUTO DIFFERENTIAL - Abnormal; Notable for the following components:       Result Value    RBC 4.58 (*)     Hemoglobin 13.0 (*)     MCHC 31.3 (*)     All other components within normal limits    Narrative:     1 LAV SHARED   COMPREHENSIVE METABOLIC PANEL - Abnormal; Notable for the following components:    Creatinine 1.5 (*)     Alkaline Phosphatase 48 (*)     ALT 9 (*)     eGFR if  47.7 (*)     eGFR if non  41.3 (*)     All other components within normal limits    Narrative:     1 LAV SHARED   ISTAT PROCEDURE - Abnormal; Notable for the following components:    POC Potassium 3.3 (*)     All other components within normal limits   ISTAT PROCEDURE - Abnormal; Notable for the following components:    POC TCO2 (MEASURED) 30 (*)     All other components within normal limits   MAGNESIUM    Narrative:     1 LAV SHARED   PHOSPHORUS    Narrative:     1 LAV SHARED   TROPONIN I    Narrative:     1 LAV SHARED   B-TYPE NATRIURETIC PEPTIDE    Narrative:     1 LAV SHARED   URINALYSIS, REFLEX TO URINE CULTURE    Narrative:     Preferred Collection Type->Urine, Clean Catch   POCT INFLUENZA A/B MOLECULAR   ISTAT CHEM8    ISTAT CHEM8          Imaging Results          MRI Brain Without Contrast (Final result)  Result time 03/26/19 23:10:38    Final result by Mireille Vann MD (03/26/19 23:10:38)                 Impression:      1. Punctate focus of subtle cortical increased diffusivity in the left frontal lobe with corresponding FLAIR hyperintensity possibly reflecting focus of recent/prior infarction noting there is no associated abnormal signal intensity on ADC maps.  No additional abnormal restricted diffusion to suggest acute infarction.  Clinical correlation advised.  2. Generalized cerebral volume loss, findings suggestive of chronic microvascular ischemic change and small remote right cerebellar infarct.      Electronically signed by: Mireille Vnan MD  Date:    03/26/2019  Time:    23:10             Narrative:    EXAMINATION:  MRI BRAIN WITHOUT CONTRAST    CLINICAL HISTORY:  Confusion/delirium, altered LOC, unexplained;    TECHNIQUE:  Multiplanar multisequence MR imaging of the brain was performed without contrast.    COMPARISON:  Head CT 03/26/2019 and 05/23/2017    FINDINGS:  There is generalized cerebral volume loss with unchanged compensatory prominence of cerebral sulci and the ventricular system, unchanged from prior examination.  There is no evidence of midline shift or hydrocephalus.  There is a punctate focus of cortical increased diffusivity involving the left frontal lobe without definite corresponding abnormal signal intensity on ADC maps.  There is corresponding FLAIR hyperintensity.  Findings could relate region of recent/prior infarction.  No additional abnormal restricted diffusion identified on today's examination.  Specifically, there is no evidence of abnormal restricted diffusion within the mariam to coincide with abnormality seen on CT examination.  There is T2/FLAIR signal hyperintensity involving the supratentorial and periventricular white matter which is nonspecific but likely reflect sequela of chronic  microvascular ischemic change.  There is a small remote right cerebellar infarct.  There are no abnormal areas of gradient susceptibility to suggest parenchymal hemorrhage.  No extra-axial hemorrhage or fluid collections. The craniocervical junction and sellar regions are within normal limits.  Skull base T2 flow-voids are present.                               CT Head Without Contrast (Final result)  Result time 03/26/19 20:48:42    Final result by Corwin Parrish MD (03/26/19 20:48:42)                 Impression:      No acute large vascular territory infarct or intracranial hemorrhage identified.  Further evaluation/follow-up as warranted.    Right pontine subtle hypoattenuating focus which could represent artifact versus age-indeterminate lacunar type infarct.  Correlate clinically.    Changes of chronic small vessel ischemic disease and cerebral volume loss, with stable right cerebellar small remote lacunar type infarct.      Electronically signed by: Corwin Parrish MD  Date:    03/26/2019  Time:    20:48             Narrative:    EXAMINATION:  CT HEAD WITHOUT CONTRAST    CLINICAL HISTORY:  Confusion/delirium, altered LOC, unexplained;    TECHNIQUE:  Low dose axial CT images obtained throughout the head without intravenous contrast. Sagittal and coronal reconstructions were performed.    COMPARISON:  Head CT 05/23/2017    FINDINGS:  Patient motion with streak artifact limited evaluation on the initial images, but mostly resolved with repeat scanning.    Intracranial compartment:    Generalized cerebral volume loss similar to prior.  The ventricles are midline and stable in size configuration without distortion by mass effect or acute hydrocephalus.  Grossly stable mild degree of supratentorial white matter chronic small vessel ischemic change.  Right cerebellar small remote lacunar type infarct, unchanged.  Subtle hypoattenuating focus at the right mariam.  No extra-axial blood or fluid collections.    No  "parenchymal mass, hemorrhage, edema or major vascular distribution infarct.    Skull/extracranial contents (limited evaluation): No fracture. Mastoid air cells and paranasal sinuses are essentially clear.                               X-Ray Chest AP Portable (Final result)  Result time 03/26/19 19:26:59    Final result by Simone Hunt MD (03/26/19 19:26:59)                 Impression:      1. No acute cardiopulmonary process.      Electronically signed by: Simone Hunt MD  Date:    03/26/2019  Time:    19:26             Narrative:    EXAMINATION:  XR CHEST AP PORTABLE    CLINICAL HISTORY:  chest pain;    TECHNIQUE:  Single frontal view of the chest was performed.    COMPARISON:  10/03/2017    FINDINGS:  The cardiomediastinal silhouette is not enlarged.  There is no pleural effusion.  The trachea is midline.  The lungs are symmetrically expanded bilaterally without evidence of acute parenchymal process.  There is mild bilateral basilar subsegmental atelectasis.  No large focal consolidation seen.  There is no pneumothorax.  The osseous structures are remarkable for degenerative changes noting remote right rib injury..                                 Medical Decision Making:   History:   Old Medical Records: I decided to obtain old medical records.  Initial Assessment:   87-year-old with PMHx of HLP, HTN, and arthritis who presents to the ED with c/o AMS. Pt's granddaughter reported noticing that the patient was "incoherent" and more confused this AM. Pt reports having burning chest pain and "pain all over." Vital signs are stable. RRR. Lungs CTA bilaterally. Strength decreased throughout. Pt oriented to place, but not to time or person. CN2-12 intact.   Differential Diagnosis:   DDx includes but is not limited to CVA/TIA, ICH, influenza, ACS, CHF, UTI, urinary retention, GUNNER, hypoglycemia, electrolyte abnormality, medication reaction.   Clinical Tests:   Lab Tests: Ordered and Reviewed  Radiological Study: " Ordered and Reviewed  Medical Tests: Reviewed and Ordered  ED Management:  Will obtain basic labs, mag, phos, and head CT. I have a low suspicion for cardiac etiology of pt's symptoms, but will check troponin, BNP, and CXR. Will also swab pt for the flu. Will give pt GI cocktail.     Influenza negative. UA with negative nitrites, negative leukocytes. Hemoglobin 13.0. No leukocytosis. Troponin negative. BNP 63. CXR without acute cardiopulmonary process. Mag and phos within normal limits. Cr elevated at 1.5, compared to baseline of 1. Will give 1 L of IVFs and repeat after completion.     Head CT without acute large vascular territory infarct or intracranial hemorrhage identified. Right pontine subtle hypoattenuating focus which could represent artifact versus age-indeterminate lacunar type infarct. Changes of chronic small vessel ischemic disease and cerebral volume loss, with stable right cerebellar small remote lacunar type infarct. Will obtain Brain MRI for further evaluation in patient with acute change in mental status.    Brain MRI with punctate focus of subtle cortical increased diffusivity in the left frontal lobe with corresponding FLAIR hyperintensity possibly reflecting focus of recent/prior infarction noting there is no associated abnormal signal intensity on ADC maps.  No additional abnormal restricted diffusion to suggest acute infarction. Generalized cerebral volume loss, findings suggestive of chronic microvascular ischemic change and small remote right cerebellar infarct.     Discussed results with Vascular Neurology, who stated that findings were not consistent with an acute stroke.     Repeat Cr 1.3. Reviewed results with patient and patient's daughter. Pt's daughter states that he is still not back to his baseline mental status and is acting altered. She expressed concern having him return home in the care of his elderly wife as his grandchild is not their to care for them anymore. Discussed with  [facility], who will admit the patient to their service for observation.     I have discussed the treatment and management of this patient with my supervising physician, and we agree on the plan of care.      SHAISTA Munoz Attestation:   Scribe #1: I performed the above scribed service and the documentation accurately describes the services I performed. I attest to the accuracy of the note.    Attending Attestation:     Physician Attestation Statement for NP/PA:   I discussed this assessment and plan of this patient with the NP/PA, but I did not personally examine the patient. The face to face encounter was performed by the NP/PA.    Other NP/PA Attestation Additions:    History of Present Illness: 87 y.o. male presents for evaluation of AMS noted by family member today.                       Clinical Impression:       ICD-10-CM ICD-9-CM   1. Altered mental status R41.82 780.97   2. Chest pain, unspecified type R07.9 786.50         Disposition:   Disposition: Placed in Observation  Condition: Stable                        India Bradley PA-C  03/27/19 0216

## 2019-03-26 NOTE — ED NOTES
Patient identifiers verified and correct for Mr Walker  C/C: Gen all over pain  SEE NN  APPEARANCE: awake and alert in NAD.  SKIN: warm, dry and intact. No breakdown or bruising.  MUSCULOSKELETAL: Patient moving all extremities spontaneously, no obvious swelling or deformities noted. Ambulates with assist due to pain   RESPIRATORY: Denies shortness of breath.Respirations unlabored.   CARDIAC: Denies CP, 2+ distal pulses; no peripheral edema  ABDOMEN: S/ND/NT, Denies nausea  : voids spontaneously, denies difficulty  Neurologic: AAO x 4; follows commands equal strength in all extremities; denies numbness/tingling. Denies dizziness Positive gen weakness, all over pain

## 2019-03-26 NOTE — ED TRIAGE NOTES
"Patient states he is "hurting all over" legs, stomach, arms, neck and shoulders, states he feels like his body is "burning" x 2 days. Denies fevers. Denies urinary symptoms. States Tylenol this morning. Family states he is "incoherent" Answers questions appropriately, unknown if he is taking Tylenol #3, Gabapentin or tramadol.   "

## 2019-03-27 PROBLEM — G93.40 ENCEPHALOPATHY ACUTE: Status: ACTIVE | Noted: 2019-03-27

## 2019-03-27 PROBLEM — I10 ESSENTIAL HYPERTENSION: Chronic | Status: ACTIVE | Noted: 2017-07-31

## 2019-03-27 PROBLEM — R41.82 ALTERED MENTAL STATUS: Status: ACTIVE | Noted: 2019-03-27

## 2019-03-27 PROBLEM — N17.9 AKI (ACUTE KIDNEY INJURY): Status: ACTIVE | Noted: 2019-03-27

## 2019-03-27 LAB
25(OH)D3+25(OH)D2 SERPL-MCNC: 25 NG/ML (ref 30–96)
ALBUMIN SERPL BCP-MCNC: 3.7 G/DL (ref 3.5–5.2)
ALP SERPL-CCNC: 51 U/L (ref 55–135)
ALT SERPL W/O P-5'-P-CCNC: 9 U/L (ref 10–44)
AMPHET+METHAMPHET UR QL: NEGATIVE
ANION GAP SERPL CALC-SCNC: 11 MMOL/L (ref 8–16)
AST SERPL-CCNC: 16 U/L (ref 10–40)
BARBITURATES UR QL SCN>200 NG/ML: NEGATIVE
BASOPHILS # BLD AUTO: 0.04 K/UL (ref 0–0.2)
BASOPHILS NFR BLD: 0.6 % (ref 0–1.9)
BENZODIAZ UR QL SCN>200 NG/ML: NEGATIVE
BILIRUB SERPL-MCNC: 0.5 MG/DL (ref 0.1–1)
BUN SERPL-MCNC: 17 MG/DL (ref 8–23)
BUN SERPL-MCNC: 18 MG/DL (ref 6–30)
BZE UR QL SCN: NEGATIVE
CALCIUM SERPL-MCNC: 9.8 MG/DL (ref 8.7–10.5)
CANNABINOIDS UR QL SCN: NEGATIVE
CHLORIDE SERPL-SCNC: 100 MMOL/L (ref 95–110)
CHLORIDE SERPL-SCNC: 102 MMOL/L (ref 95–110)
CK SERPL-CCNC: 100 U/L (ref 20–200)
CK SERPL-CCNC: 102 U/L (ref 20–200)
CO2 SERPL-SCNC: 27 MMOL/L (ref 23–29)
CREAT SERPL-MCNC: 1.2 MG/DL (ref 0.5–1.4)
CREAT SERPL-MCNC: 1.3 MG/DL (ref 0.5–1.4)
CREAT UR-MCNC: 93 MG/DL (ref 23–375)
CRP SERPL-MCNC: 7.02 MG/L (ref 0–3.19)
CRP SERPL-MCNC: 7.1 MG/L (ref 0–8.2)
DIFFERENTIAL METHOD: ABNORMAL
EOSINOPHIL # BLD AUTO: 0.2 K/UL (ref 0–0.5)
EOSINOPHIL NFR BLD: 3 % (ref 0–8)
ERYTHROCYTE [DISTWIDTH] IN BLOOD BY AUTOMATED COUNT: 13.5 % (ref 11.5–14.5)
ERYTHROCYTE [SEDIMENTATION RATE] IN BLOOD BY WESTERGREN METHOD: 56 MM/HR (ref 0–23)
EST. GFR  (AFRICAN AMERICAN): >60 ML/MIN/1.73 M^2
EST. GFR  (NON AFRICAN AMERICAN): 54 ML/MIN/1.73 M^2
ESTIMATED AVG GLUCOSE: 111 MG/DL (ref 68–131)
ETHANOL UR-MCNC: <10 MG/DL
GLUCOSE SERPL-MCNC: 72 MG/DL (ref 70–110)
GLUCOSE SERPL-MCNC: 78 MG/DL (ref 70–110)
HBA1C MFR BLD HPLC: 5.5 % (ref 4–5.6)
HCT VFR BLD AUTO: 43.9 % (ref 40–54)
HCT VFR BLD CALC: 38 %PCV (ref 36–54)
HGB BLD-MCNC: 12.9 G/DL (ref 14–18)
IMM GRANULOCYTES # BLD AUTO: 0.01 K/UL (ref 0–0.04)
IMM GRANULOCYTES NFR BLD AUTO: 0.1 % (ref 0–0.5)
LYMPHOCYTES # BLD AUTO: 1.9 K/UL (ref 1–4.8)
LYMPHOCYTES NFR BLD: 28.5 % (ref 18–48)
MAGNESIUM SERPL-MCNC: 1.9 MG/DL (ref 1.6–2.6)
MCH RBC QN AUTO: 28 PG (ref 27–31)
MCHC RBC AUTO-ENTMCNC: 29.4 G/DL (ref 32–36)
MCV RBC AUTO: 95 FL (ref 82–98)
METHADONE UR QL SCN>300 NG/ML: NEGATIVE
MONOCYTES # BLD AUTO: 1 K/UL (ref 0.3–1)
MONOCYTES NFR BLD: 15.4 % (ref 4–15)
NEUTROPHILS # BLD AUTO: 3.5 K/UL (ref 1.8–7.7)
NEUTROPHILS NFR BLD: 52.4 % (ref 38–73)
NRBC BLD-RTO: 0 /100 WBC
OPIATES UR QL SCN: NEGATIVE
PCP UR QL SCN>25 NG/ML: NEGATIVE
PHOSPHATE SERPL-MCNC: 3.1 MG/DL (ref 2.7–4.5)
PLATELET # BLD AUTO: 206 K/UL (ref 150–350)
PMV BLD AUTO: 10.3 FL (ref 9.2–12.9)
POC IONIZED CALCIUM: 1.2 MMOL/L (ref 1.06–1.42)
POC TCO2 (MEASURED): 30 MMOL/L (ref 23–29)
POTASSIUM BLD-SCNC: 3.5 MMOL/L (ref 3.5–5.1)
POTASSIUM SERPL-SCNC: 3.2 MMOL/L (ref 3.5–5.1)
PROT SERPL-MCNC: 8 G/DL (ref 6–8.4)
RBC # BLD AUTO: 4.61 M/UL (ref 4.6–6.2)
RPR SER QL: NORMAL
SAMPLE: ABNORMAL
SODIUM BLD-SCNC: 142 MMOL/L (ref 136–145)
SODIUM SERPL-SCNC: 140 MMOL/L (ref 136–145)
TOXICOLOGY INFORMATION: NORMAL
TROPONIN I SERPL DL<=0.01 NG/ML-MCNC: 0.01 NG/ML (ref 0–0.03)
TROPONIN I SERPL DL<=0.01 NG/ML-MCNC: <0.006 NG/ML (ref 0–0.03)
TSH SERPL DL<=0.005 MIU/L-ACNC: 1.2 UIU/ML (ref 0.4–4)
VIT B12 SERPL-MCNC: 299 PG/ML (ref 210–950)
WBC # BLD AUTO: 6.7 K/UL (ref 3.9–12.7)

## 2019-03-27 PROCEDURE — 85025 COMPLETE CBC W/AUTO DIFF WBC: CPT | Mod: HCNC

## 2019-03-27 PROCEDURE — G0378 HOSPITAL OBSERVATION PER HR: HCPCS | Mod: HCNC

## 2019-03-27 PROCEDURE — 82550 ASSAY OF CK (CPK): CPT | Mod: 91,HCNC

## 2019-03-27 PROCEDURE — 86141 C-REACTIVE PROTEIN HS: CPT | Mod: HCNC

## 2019-03-27 PROCEDURE — 80053 COMPREHEN METABOLIC PANEL: CPT | Mod: HCNC

## 2019-03-27 PROCEDURE — 25000003 PHARM REV CODE 250: Mod: HCNC | Performed by: PHYSICIAN ASSISTANT

## 2019-03-27 PROCEDURE — 84100 ASSAY OF PHOSPHORUS: CPT | Mod: HCNC

## 2019-03-27 PROCEDURE — 86592 SYPHILIS TEST NON-TREP QUAL: CPT | Mod: HCNC

## 2019-03-27 PROCEDURE — 84443 ASSAY THYROID STIM HORMONE: CPT | Mod: HCNC

## 2019-03-27 PROCEDURE — 86140 C-REACTIVE PROTEIN: CPT | Mod: HCNC

## 2019-03-27 PROCEDURE — 99220 PR INITIAL OBSERVATION CARE,LEVL III: CPT | Mod: HCNC,,, | Performed by: PHYSICIAN ASSISTANT

## 2019-03-27 PROCEDURE — 25000242 PHARM REV CODE 250 ALT 637 W/ HCPCS: Mod: HCNC | Performed by: PHYSICIAN ASSISTANT

## 2019-03-27 PROCEDURE — 83735 ASSAY OF MAGNESIUM: CPT | Mod: HCNC

## 2019-03-27 PROCEDURE — 82607 VITAMIN B-12: CPT | Mod: HCNC

## 2019-03-27 PROCEDURE — 84425 ASSAY OF VITAMIN B-1: CPT | Mod: HCNC

## 2019-03-27 PROCEDURE — 82550 ASSAY OF CK (CPK): CPT | Mod: HCNC

## 2019-03-27 PROCEDURE — 84484 ASSAY OF TROPONIN QUANT: CPT | Mod: HCNC

## 2019-03-27 PROCEDURE — 85652 RBC SED RATE AUTOMATED: CPT | Mod: HCNC

## 2019-03-27 PROCEDURE — 82306 VITAMIN D 25 HYDROXY: CPT | Mod: HCNC

## 2019-03-27 PROCEDURE — 99220 PR INITIAL OBSERVATION CARE,LEVL III: ICD-10-PCS | Mod: HCNC,,, | Performed by: PHYSICIAN ASSISTANT

## 2019-03-27 PROCEDURE — 83036 HEMOGLOBIN GLYCOSYLATED A1C: CPT | Mod: HCNC

## 2019-03-27 RX ORDER — RAMELTEON 8 MG/1
8 TABLET ORAL NIGHTLY PRN
Status: DISCONTINUED | OUTPATIENT
Start: 2019-03-27 | End: 2019-03-28 | Stop reason: HOSPADM

## 2019-03-27 RX ORDER — PROMETHAZINE HYDROCHLORIDE 25 MG/1
25 TABLET ORAL EVERY 6 HOURS PRN
Status: DISCONTINUED | OUTPATIENT
Start: 2019-03-27 | End: 2019-03-28 | Stop reason: HOSPADM

## 2019-03-27 RX ORDER — IBUPROFEN 200 MG
16 TABLET ORAL
Status: DISCONTINUED | OUTPATIENT
Start: 2019-03-27 | End: 2019-03-28 | Stop reason: HOSPADM

## 2019-03-27 RX ORDER — IBUPROFEN 200 MG
24 TABLET ORAL
Status: DISCONTINUED | OUTPATIENT
Start: 2019-03-27 | End: 2019-03-28 | Stop reason: HOSPADM

## 2019-03-27 RX ORDER — DONEPEZIL HYDROCHLORIDE 10 MG/1
10 TABLET, FILM COATED ORAL NIGHTLY
Status: DISCONTINUED | OUTPATIENT
Start: 2019-03-27 | End: 2019-03-28 | Stop reason: HOSPADM

## 2019-03-27 RX ORDER — FLUTICASONE PROPIONATE 50 MCG
2 SPRAY, SUSPENSION (ML) NASAL DAILY
Status: DISCONTINUED | OUTPATIENT
Start: 2019-03-27 | End: 2019-03-28 | Stop reason: HOSPADM

## 2019-03-27 RX ORDER — ACETAMINOPHEN 325 MG/1
650 TABLET ORAL EVERY 4 HOURS PRN
Status: DISCONTINUED | OUTPATIENT
Start: 2019-03-27 | End: 2019-03-28 | Stop reason: HOSPADM

## 2019-03-27 RX ORDER — SODIUM CHLORIDE 0.9 % (FLUSH) 0.9 %
5 SYRINGE (ML) INJECTION
Status: DISCONTINUED | OUTPATIENT
Start: 2019-03-27 | End: 2019-03-28 | Stop reason: HOSPADM

## 2019-03-27 RX ORDER — HYDRALAZINE HYDROCHLORIDE 25 MG/1
25 TABLET, FILM COATED ORAL EVERY 8 HOURS PRN
Status: DISCONTINUED | OUTPATIENT
Start: 2019-03-27 | End: 2019-03-28 | Stop reason: HOSPADM

## 2019-03-27 RX ORDER — POTASSIUM CHLORIDE 20 MEQ/1
40 TABLET, EXTENDED RELEASE ORAL EVERY 4 HOURS
Status: COMPLETED | OUTPATIENT
Start: 2019-03-27 | End: 2019-03-27

## 2019-03-27 RX ORDER — POLYETHYLENE GLYCOL 3350 17 G/17G
17 POWDER, FOR SOLUTION ORAL DAILY
Status: DISCONTINUED | OUTPATIENT
Start: 2019-03-27 | End: 2019-03-28 | Stop reason: HOSPADM

## 2019-03-27 RX ORDER — CHOLECALCIFEROL (VITAMIN D3) 25 MCG
1000 TABLET ORAL DAILY
Status: DISCONTINUED | OUTPATIENT
Start: 2019-03-27 | End: 2019-03-28 | Stop reason: HOSPADM

## 2019-03-27 RX ORDER — IPRATROPIUM BROMIDE AND ALBUTEROL SULFATE 2.5; .5 MG/3ML; MG/3ML
3 SOLUTION RESPIRATORY (INHALATION) EVERY 4 HOURS PRN
Status: DISCONTINUED | OUTPATIENT
Start: 2019-03-27 | End: 2019-03-28 | Stop reason: HOSPADM

## 2019-03-27 RX ORDER — ONDANSETRON 8 MG/1
8 TABLET, ORALLY DISINTEGRATING ORAL EVERY 8 HOURS PRN
Status: DISCONTINUED | OUTPATIENT
Start: 2019-03-27 | End: 2019-03-28 | Stop reason: HOSPADM

## 2019-03-27 RX ORDER — GABAPENTIN 100 MG/1
100 CAPSULE ORAL NIGHTLY
Status: DISCONTINUED | OUTPATIENT
Start: 2019-03-27 | End: 2019-03-28 | Stop reason: HOSPADM

## 2019-03-27 RX ORDER — VERAPAMIL HYDROCHLORIDE 240 MG/1
240 TABLET, FILM COATED, EXTENDED RELEASE ORAL DAILY
Status: DISCONTINUED | OUTPATIENT
Start: 2019-03-27 | End: 2019-03-28 | Stop reason: HOSPADM

## 2019-03-27 RX ORDER — TAMSULOSIN HYDROCHLORIDE 0.4 MG/1
1 CAPSULE ORAL DAILY
Status: DISCONTINUED | OUTPATIENT
Start: 2019-03-27 | End: 2019-03-28 | Stop reason: HOSPADM

## 2019-03-27 RX ORDER — LOVASTATIN 20 MG/1
20 TABLET ORAL DAILY
Status: DISCONTINUED | OUTPATIENT
Start: 2019-03-27 | End: 2019-03-28 | Stop reason: HOSPADM

## 2019-03-27 RX ORDER — FINASTERIDE 5 MG/1
5 TABLET, FILM COATED ORAL DAILY
Status: DISCONTINUED | OUTPATIENT
Start: 2019-03-27 | End: 2019-03-28 | Stop reason: HOSPADM

## 2019-03-27 RX ORDER — MELOXICAM 7.5 MG/1
15 TABLET ORAL DAILY
Status: DISCONTINUED | OUTPATIENT
Start: 2019-03-27 | End: 2019-03-28 | Stop reason: HOSPADM

## 2019-03-27 RX ORDER — TRIAMTERENE AND HYDROCHLOROTHIAZIDE 37.5; 25 MG/1; MG/1
1 CAPSULE ORAL DAILY
Status: DISCONTINUED | OUTPATIENT
Start: 2019-03-27 | End: 2019-03-27

## 2019-03-27 RX ORDER — AMOXICILLIN 250 MG
1 CAPSULE ORAL 2 TIMES DAILY
Status: DISCONTINUED | OUTPATIENT
Start: 2019-03-27 | End: 2019-03-28 | Stop reason: HOSPADM

## 2019-03-27 RX ORDER — GLUCAGON 1 MG
1 KIT INJECTION
Status: DISCONTINUED | OUTPATIENT
Start: 2019-03-27 | End: 2019-03-28 | Stop reason: HOSPADM

## 2019-03-27 RX ADMIN — DONEPEZIL HYDROCHLORIDE 10 MG: 10 TABLET ORAL at 09:03

## 2019-03-27 RX ADMIN — HYDRALAZINE HYDROCHLORIDE 25 MG: 25 TABLET, FILM COATED ORAL at 06:03

## 2019-03-27 RX ADMIN — TAMSULOSIN HYDROCHLORIDE 0.4 MG: 0.4 CAPSULE ORAL at 08:03

## 2019-03-27 RX ADMIN — VERAPAMIL HYDROCHLORIDE 240 MG: 240 TABLET, FILM COATED, EXTENDED RELEASE ORAL at 01:03

## 2019-03-27 RX ADMIN — POTASSIUM CHLORIDE 40 MEQ: 1500 TABLET, EXTENDED RELEASE ORAL at 09:03

## 2019-03-27 RX ADMIN — FINASTERIDE 5 MG: 5 TABLET, FILM COATED ORAL at 08:03

## 2019-03-27 RX ADMIN — POTASSIUM CHLORIDE 40 MEQ: 1500 TABLET, EXTENDED RELEASE ORAL at 03:03

## 2019-03-27 RX ADMIN — FLUTICASONE PROPIONATE 100 MCG: 50 SPRAY, METERED NASAL at 09:03

## 2019-03-27 RX ADMIN — VITAMIN D, TAB 1000IU (100/BT) 1000 UNITS: 25 TAB at 12:03

## 2019-03-27 RX ADMIN — LOVASTATIN 20 MG: 20 TABLET ORAL at 08:03

## 2019-03-27 RX ADMIN — STANDARDIZED SENNA CONCENTRATE AND DOCUSATE SODIUM 1 TABLET: 8.6; 5 TABLET, FILM COATED ORAL at 09:03

## 2019-03-27 RX ADMIN — GABAPENTIN 100 MG: 100 CAPSULE ORAL at 09:03

## 2019-03-27 RX ADMIN — MELOXICAM 15 MG: 7.5 TABLET ORAL at 08:03

## 2019-03-27 RX ADMIN — STANDARDIZED SENNA CONCENTRATE AND DOCUSATE SODIUM 1 TABLET: 8.6; 5 TABLET, FILM COATED ORAL at 08:03

## 2019-03-27 NOTE — ASSESSMENT & PLAN NOTE
- Creatinine 1.5 up from 1.0 on 2/16/2018, GFR down to 47.7 from >60.0.  - Creatinine improved to 1.3 following NS bolus 1L in ED.  Will give cautious second liter.  - UA unremarkable.  Family notes decreased PO intake.  - Hold Dyazide.

## 2019-03-27 NOTE — H&P
"[facility] Hospital Medicine  History & Physical    Patient Name: Kevyn Leach  MRN: [id number]  Admission Date: 3/26/2019  Attending Physician: Michael Gresham MD   Primary Care Provider: Francisco Lnidquist MD    [facility] Medicine Team: Networked reference to record PCT  Annamarie Brasher PA-C     Patient information was obtained from patient, past medical records and ER records.     Subjective:     Principal Problem:Encephalopathy acute    Chief Complaint:   Chief Complaint   Patient presents with    Multiple Complaints     hx neuropathy, hurting in both my legs, back and my whole body        HPI: Patient is an 87 year old gentleman with a h/o HTN, HLD, BPH, and GERD.  He presents with altered mental status.  No family at bedside.  Per ED note, patient's granddaughter noticed that he was increasingly confused and "incoherent" this morning.  This persisted throughout the day.  She also reported decreased PO intake.  On exam, patient states that he came to the hospital due to "pain all over."  Pain is described as a burning, he particularly notes neck pain, bilateral shoulder pain and leg pain.  Patient states pain began after eating two breakfast sausages.  He noted left sided chest burning/pressure following his breakfast to ED doctor, but denies chest pain on this provider's exam.  Denies SOB, headache, dizziness, palpitations, fever/chills, N/V/D, cough, sore throat.  Complains of rhinitis.  Patient brought home medications with him and has prescriptions for Tylenol #3, Gabapentin and Tramadol, but unclear if he is taking those.      Past Medical History:   Diagnosis Date    Amblyopia     refractive amblyopia left eye ()    Arthritis     BPH (benign prostatic hypertrophy)     Colon polyp     Generalized pain     Hyperlipidemia     Hypertension     URI (upper respiratory infection)        Past Surgical History:   Procedure Laterality Date    CATARACT EXTRACTION W/  " INTRAOCULAR LENS IMPLANT Right n/a    done at [facility] before 2004    CATARACT EXTRACTION W/  INTRAOCULAR LENS IMPLANT Left 11/30/2016    OS     EYE SURGERY      INSERTION-INTRAOCULAR LENS (IOL) Left 11/30/2016    Performed by Annamarie Darling MD at [facility] OR 1ST FLR    PHACOEMULSIFICATION-ASPIRATION-CATARACT Left 11/30/2016    Performed by Annamarie Darling MD at [facility] OR 1ST FLR    PROSTATE SURGERY      TRANSURETHRAL RESECTION OF PROSTATE         Review of patient's allergies indicates:  No Known Allergies    No current facility-administered medications on file prior to encounter.      Current Outpatient Medications on File Prior to Encounter   Medication Sig    acetaminophen with codeine (TYLENOL-CODEINE #3 ORAL) Take by mouth.    donepezil (ARICEPT) 10 MG tablet Take 1 tablet (10 mg total) by mouth every evening.    finasteride (PROSCAR) 5 mg tablet TAKE ONE TABLET BY MOUTH ONCE DAILY    gabapentin (NEURONTIN) 100 MG capsule Take 1 capsule (100 mg total) by mouth once daily. (Patient taking differently: Take 100 mg by mouth every evening. )    lovastatin (MEVACOR) 20 MG tablet TAKE ONE TABLET BY MOUTH ONCE DAILY    tamsulosin (FLOMAX) 0.4 mg Cp24 TAKE ONE CAPSULE BY MOUTH ONCE DAILY    traMADol (ULTRAM) 50 mg tablet Take 50 mg by mouth every 6 (six) hours as needed for Pain.    triamterene-hydrochlorothiazide 37.5-25 mg (DYAZIDE) 37.5-25 mg per capsule TAKE ONE CAPSULE BY MOUTH ONCE DAILY (Patient taking differently: TAKE ONE CAPSULE BY MOUTH ONCE DAILY takes at noon)    verapamil (CALAN-SR) 240 MG CR tablet TAKE ONE TABLET BY MOUTH ONCE DAILY    docusate sodium (COLACE) 100 MG capsule Take 1 capsule (100 mg total) by mouth 2 (two) times daily.    hydrocortisone 2.5 % cream Apply topically 2 (two) times daily.    meloxicam (MOBIC) 15 MG tablet Take 1 tablet (15 mg total) by mouth once daily.    meloxicam (MOBIC) 15 MG tablet TAKE ONE TABLET BY MOUTH ONCE DAILY    polyethylene  glycol (GLYCOLAX) 17 gram/dose powder Take 17 g by mouth once daily.     Family History     Problem Relation (Age of Onset)    Cancer Brother    Diabetes Father, Paternal Aunt    No Known Problems Mother        Tobacco Use    Smoking status: Former Smoker     Last attempt to quit: 1987     Years since quittin.5    Smokeless tobacco: Never Used   Substance and Sexual Activity    Alcohol use: No    Drug use: No    Sexual activity: Never     Review of Systems   Constitutional: Negative for activity change, appetite change, chills, diaphoresis, fatigue, fever and unexpected weight change.   HENT: Negative for congestion, rhinorrhea, sore throat, trouble swallowing and voice change.    Eyes: Negative for visual disturbance.   Respiratory: Negative for cough, choking, chest tightness, shortness of breath and wheezing.    Cardiovascular: Negative for chest pain, palpitations and leg swelling.   Gastrointestinal: Negative for abdominal distention, abdominal pain, anal bleeding, blood in stool, constipation, diarrhea, nausea and vomiting.   Endocrine: Negative for cold intolerance, heat intolerance, polydipsia and polyuria.   Genitourinary: Negative for dysuria, flank pain, frequency, hematuria and urgency.   Musculoskeletal: Positive for arthralgias and myalgias. Negative for back pain and joint swelling.   Skin: Negative for color change and rash.   Neurological: Negative for dizziness, seizures, syncope, facial asymmetry, speech difficulty, weakness, light-headedness, numbness and headaches.   Hematological: Negative for adenopathy. Does not bruise/bleed easily.   Psychiatric/Behavioral: Positive for confusion. Negative for agitation, hallucinations and suicidal ideas.     Objective:     Vital Signs (Most Recent):  Temp: 98.5 °F (36.9 °C) (19)  Pulse: 60 (19)  Resp: 17 (19)  BP: (!) 150/70 (19)  SpO2: 100 % (19) Vital Signs (24h Range):  Temp:  [98.2 °F  (36.8 °C)-98.5 °F (36.9 °C)] 98.5 °F (36.9 °C)  Pulse:  [60-66] 60  Resp:  [17-18] 17  SpO2:  [99 %-100 %] 100 %  BP: (149-150)/(70) 150/70     Weight: 68 kg (150 lb)  Body mass index is 22.15 kg/m².    Physical Exam   Constitutional: He appears well-developed and well-nourished. No distress.   HENT:   Head: Normocephalic and atraumatic.   Eyes: Pupils are equal, round, and reactive to light.   Neck: Neck supple. Carotid bruit is not present. No thyromegaly present.   Cardiovascular: Normal rate and regular rhythm. Exam reveals no gallop.   No murmur heard.  Pulmonary/Chest: Effort normal and breath sounds normal. No respiratory distress. He has no wheezes.   Abdominal: Bowel sounds are normal. He exhibits no distension. There is no splenomegaly or hepatomegaly. There is no tenderness.   Musculoskeletal: Normal range of motion. He exhibits no edema.   Neurological: He is alert. No cranial nerve deficit or sensory deficit. GCS eye subscore is 4. GCS verbal subscore is 5. GCS motor subscore is 6.   Oriented to person, place, month (not year)   Skin: Skin is warm and dry. No rash noted.   Psychiatric: He has a normal mood and affect. His behavior is normal.         CRANIAL NERVES     CN III, IV, VI   Pupils are equal, round, and reactive to light.       Significant Labs:   CBC:   Recent Labs   Lab 03/26/19 1905 03/26/19 1913 03/27/19  0122   WBC 5.45  --   --    HGB 13.0*  --   --    HCT 41.6 39 38     --   --      CMP:   Recent Labs   Lab 03/26/19 1905      K 3.5      CO2 26   GLU 80   BUN 21   CREATININE 1.5*   CALCIUM 9.6   PROT 7.9   ALBUMIN 3.7   BILITOT 0.4   ALKPHOS 48*   AST 15   ALT 9*   ANIONGAP 11   EGFRNONAA 41.3*     Cardiac Markers:   Recent Labs   Lab 03/26/19 1905   BNP 63     Troponin:   Recent Labs   Lab 03/26/19 1905   TROPONINI 0.006     Urine Studies:   Recent Labs   Lab 03/26/19  1937   COLORU Yellow   APPEARANCEUA Clear   PHUR 5.0   SPECGRAV 1.015   PROTEINUA Negative    GLUCUA Negative   KETONESU Negative   BILIRUBINUA Negative   OCCULTUA Negative   NITRITE Negative   LEUKOCYTESUR Negative       Significant Imaging: I have reviewed all pertinent imaging results/findings within the past 24 hours.    Assessment/Plan:     * Encephalopathy acute  - CT head showed Right pontine subtle hypoattenuating focus, which could represent artifact versus age-indeterminate lacunar type infarct, and changes of chronic small vessel ischemic disease and cerebral volume loss, with stable right cerebellar small remote lacunar type infarct.  - MRI showed punctate focus of subtle cortical increased diffusivity in the left frontal lobe with corresponding FLAIR hyperintensity possibly reflecting focus of recent/prior infarction noting there is no associated abnormal signal intensity on ADC maps.  No additional abnormal restricted diffusion to suggest acute infarction.  Clinical correlation advised.  Generalized cerebral volume loss, findings suggestive of chronic microvascular ischemic change and small remote right cerebellar infarct also noted.  - Imaging findings were discussed by ED provider with stroke team, who did not believe findings were consistent with acute stroke.  - UA unremarkable.  - Influenza negative.  Afebrile with no evidence of leukocytosis.  - Patient currently alert and oriented to person, place, month (not year).  - Patient does complain of myalgias/arthralgias.  Continue Gabapentin 100mg qHS, Mobic 15mg daily.  - Has prescriptions for Tylenol #3 and Tramadol, but unclear if he is taking them.  Will hold for now and check Utox.  - Continue Aricept 10mg qHS.  - PT/OT, SW consult.  Neuro checks q4hrs.    DELIRIUM BEHAVIOR MANAGEMENT  - Minimize use of restraints; if physical restraints necessary, please utilize medical/chemical prns for agitation.  - Keep shades open and room lit during day and room dim at night in order to promote healthy circadian rhythms.  - Encourage family at  bedside  - Keep whiteboard in patient's room current with the date and name of the members of patient's team for easy patient self re-orientation.  - Avoid benzodiazepines, antihistamines, anticholinergics, hypnotics, and minimize opiates while controlling for pain as these medications may exacerbate delirium.      GUNNER (acute kidney injury)  - Creatinine 1.5 up from 1.0 on 2/16/2018, GFR down to 47.7 from >60.0.  - Creatinine improved to 1.3 following NS bolus 1L in ED.  Will give cautious second liter.  - UA unremarkable.  Family notes decreased PO intake.  - Hold Dyazide.      Essential hypertension  - Continue verapamil 240mg daily.  Will hold Dyazide 37.5-25mg daily given GUNNER.      Hyperlipidemia  - Continue lovastatin 20mg daily.      GERD with esophagitis  - Will give GI cocktail.  - Given complaints of chest pain to ED provider, will trend troponins.  First one 0.008.  No acute changes on ECG.    Benign prostatic hyperplasia  - Continue finasteride 5mg daily, tamsulosin 0.4mg daily.      VTE Risk Mitigation (From admission, onward)        Ordered     Place sequential compression device  Until discontinued      03/27/19 0526     Place SUZETTE hose  Until discontinued      03/27/19 0526     IP VTE HIGH RISK PATIENT  Once      03/27/19 0526             Annamarie Brasher PA-C  Department of Hospital Medicine   [facility]

## 2019-03-27 NOTE — HPI
"Patient is an 87 year old gentleman with a h/o HTN, HLD, BPH, and GERD.  He presents with altered mental status.  No family at bedside.  Per ED note, patient's granddaughter noticed that he was increasingly confused and "incoherent" this morning.  This persisted throughout the day.  She also reported decreased PO intake.  On exam, patient states that he came to the hospital due to "pain all over."  Pain is described as a burning, he particularly notes neck pain, bilateral shoulder pain and leg pain.  Patient states pain began after eating two breakfast sausages.  He noted left sided chest burning/pressure following his breakfast to ED doctor, but denies chest pain on this provider's exam.  Denies SOB, headache, dizziness, palpitations, fever/chills, N/V/D, cough, sore throat.  Complains of rhinitis.  Patient brought home medications with him and has prescriptions for Tylenol #3, Gabapentin and Tramadol, but unclear if he is taking those.    "

## 2019-03-27 NOTE — ASSESSMENT & PLAN NOTE
- Will give GI cocktail.  - Given complaints of chest pain to ED provider, will trend troponins, flat.  No acute changes on ECG.

## 2019-03-27 NOTE — CARE UPDATE
Pt admitted for altered mental status. [facility] neuro reviewed imaging- no acute infarct but recommend daily ASA and statin (will order)  Pt seen at bedside this AM with daughter present who reports patient's mentation is much improved. Alert and oriented to person, place, daughter. Not oriented to year (baseline per daughter)  PTOT consults  Cr improved after IVF  Will order CPK, ESR, CRP, Urine tox given myalgias

## 2019-03-27 NOTE — CARE UPDATE
Imaging (MRI brain and CT head) reviewed with Vascular Neurology staff, Dr. Pereira.     No acute infarct seen on imaging. Prior left frontal hyperintensity noted; however, this would not explain patient's current symptoms.     Patient would benefit from ASA 81 mg and continuing lovastatin for future stroke prevention if no contraindications.     Please call with any questions or concerns.     Corrina Bravo NP  [facility] Stroke [place]  Department of Vascular Neurology   [facility]  [phone]

## 2019-03-27 NOTE — ASSESSMENT & PLAN NOTE
- CT head showed Right pontine subtle hypoattenuating focus, which could represent artifact versus age-indeterminate lacunar type infarct, and changes of chronic small vessel ischemic disease and cerebral volume loss, with stable right cerebellar small remote lacunar type infarct.  - MRI showed punctate focus of subtle cortical increased diffusivity in the left frontal lobe with corresponding FLAIR hyperintensity possibly reflecting focus of recent/prior infarction noting there is no associated abnormal signal intensity on ADC maps.  No additional abnormal restricted diffusion to suggest acute infarction.  Clinical correlation advised.  Generalized cerebral volume loss, findings suggestive of chronic microvascular ischemic change and small remote right cerebellar infarct also noted.  - Imaging findings were discussed by ED provider with stroke team, who did not believe findings were consistent with acute stroke.  - UA unremarkable.  - Influenza negative.  Afebrile with no evidence of leukocytosis.  - Patient currently alert and oriented to person, place, month (not year).  - Patient does complain of myalgias/arthralgias.  Continue Gabapentin 100mg qHS, Mobic 15mg daily.  - Has prescriptions for Tylenol #3 and Tramadol, but unclear if he is taking them.  Will hold for now and check Utox.  - Continue Aricept 10mg qHS.  - PT/OT, SW consult.  Neuro checks q4hrs.    DELIRIUM BEHAVIOR MANAGEMENT  - Minimize use of restraints; if physical restraints necessary, please utilize medical/chemical prns for agitation.  - Keep shades open and room lit during day and room dim at night in order to promote healthy circadian rhythms.  - Encourage family at bedside  - Keep whiteboard in patient's room current with the date and name of the members of patient's team for easy patient self re-orientation.  - Avoid benzodiazepines, antihistamines, anticholinergics, hypnotics, and minimize opiates while controlling for pain as these  medications may exacerbate delirium.

## 2019-03-27 NOTE — PROGRESS NOTES
Pt arrived to room from ED. Orientated to room and call light. Daughter at bedside. Pt did not want to change into a gown at the moment, insisted on staying in street clothes. NAD noted. AAOX4 (except to year). Urinal and urine specimen cup placed at bedside. Instructed pt to provide sample for urine tox screen, pt verbalized understanding.

## 2019-03-28 VITALS
WEIGHT: 151.88 LBS | OXYGEN SATURATION: 95 % | SYSTOLIC BLOOD PRESSURE: 132 MMHG | DIASTOLIC BLOOD PRESSURE: 62 MMHG | BODY MASS INDEX: 22.49 KG/M2 | HEIGHT: 69 IN | HEART RATE: 71 BPM | RESPIRATION RATE: 14 BRPM | TEMPERATURE: 98 F

## 2019-03-28 LAB
ALBUMIN SERPL BCP-MCNC: 3.2 G/DL (ref 3.5–5.2)
ALP SERPL-CCNC: 45 U/L (ref 55–135)
ALT SERPL W/O P-5'-P-CCNC: 7 U/L (ref 10–44)
AMPHET+METHAMPHET UR QL: NEGATIVE
ANION GAP SERPL CALC-SCNC: 8 MMOL/L (ref 8–16)
AST SERPL-CCNC: 14 U/L (ref 10–40)
BARBITURATES UR QL SCN>200 NG/ML: NEGATIVE
BASOPHILS # BLD AUTO: 0.03 K/UL (ref 0–0.2)
BASOPHILS NFR BLD: 0.5 % (ref 0–1.9)
BENZODIAZ UR QL SCN>200 NG/ML: NEGATIVE
BILIRUB SERPL-MCNC: 0.3 MG/DL (ref 0.1–1)
BUN SERPL-MCNC: 20 MG/DL (ref 8–23)
BZE UR QL SCN: NEGATIVE
CALCIUM SERPL-MCNC: 9.3 MG/DL (ref 8.7–10.5)
CANNABINOIDS UR QL SCN: NEGATIVE
CHLORIDE SERPL-SCNC: 105 MMOL/L (ref 95–110)
CO2 SERPL-SCNC: 27 MMOL/L (ref 23–29)
CREAT SERPL-MCNC: 1.2 MG/DL (ref 0.5–1.4)
CREAT UR-MCNC: 156 MG/DL (ref 23–375)
DIFFERENTIAL METHOD: ABNORMAL
EOSINOPHIL # BLD AUTO: 0.2 K/UL (ref 0–0.5)
EOSINOPHIL NFR BLD: 2.6 % (ref 0–8)
ERYTHROCYTE [DISTWIDTH] IN BLOOD BY AUTOMATED COUNT: 13.3 % (ref 11.5–14.5)
EST. GFR  (AFRICAN AMERICAN): >60 ML/MIN/1.73 M^2
EST. GFR  (NON AFRICAN AMERICAN): 54 ML/MIN/1.73 M^2
ETHANOL UR-MCNC: <10 MG/DL
GLUCOSE SERPL-MCNC: 80 MG/DL (ref 70–110)
HCT VFR BLD AUTO: 38.6 % (ref 40–54)
HGB BLD-MCNC: 12 G/DL (ref 14–18)
IMM GRANULOCYTES # BLD AUTO: 0.01 K/UL (ref 0–0.04)
IMM GRANULOCYTES NFR BLD AUTO: 0.2 % (ref 0–0.5)
LYMPHOCYTES # BLD AUTO: 1.9 K/UL (ref 1–4.8)
LYMPHOCYTES NFR BLD: 30.6 % (ref 18–48)
MCH RBC QN AUTO: 27.9 PG (ref 27–31)
MCHC RBC AUTO-ENTMCNC: 31.1 G/DL (ref 32–36)
MCV RBC AUTO: 90 FL (ref 82–98)
METHADONE UR QL SCN>300 NG/ML: NEGATIVE
MONOCYTES # BLD AUTO: 0.8 K/UL (ref 0.3–1)
MONOCYTES NFR BLD: 13.1 % (ref 4–15)
NEUTROPHILS # BLD AUTO: 3.2 K/UL (ref 1.8–7.7)
NEUTROPHILS NFR BLD: 53 % (ref 38–73)
NRBC BLD-RTO: 0 /100 WBC
OPIATES UR QL SCN: NEGATIVE
PCP UR QL SCN>25 NG/ML: NEGATIVE
PLATELET # BLD AUTO: 229 K/UL (ref 150–350)
PMV BLD AUTO: 10 FL (ref 9.2–12.9)
POTASSIUM SERPL-SCNC: 4 MMOL/L (ref 3.5–5.1)
PROT SERPL-MCNC: 6.9 G/DL (ref 6–8.4)
RBC # BLD AUTO: 4.3 M/UL (ref 4.6–6.2)
SODIUM SERPL-SCNC: 140 MMOL/L (ref 136–145)
TOXICOLOGY INFORMATION: NORMAL
WBC # BLD AUTO: 6.11 K/UL (ref 3.9–12.7)

## 2019-03-28 PROCEDURE — 99217 PR OBSERVATION CARE DISCHARGE: ICD-10-PCS | Mod: HCNC,,, | Performed by: PHYSICIAN ASSISTANT

## 2019-03-28 PROCEDURE — 80053 COMPREHEN METABOLIC PANEL: CPT | Mod: HCNC

## 2019-03-28 PROCEDURE — 25000003 PHARM REV CODE 250: Mod: HCNC | Performed by: PHYSICIAN ASSISTANT

## 2019-03-28 PROCEDURE — G0378 HOSPITAL OBSERVATION PER HR: HCPCS | Mod: HCNC

## 2019-03-28 PROCEDURE — 36415 COLL VENOUS BLD VENIPUNCTURE: CPT | Mod: HCNC

## 2019-03-28 PROCEDURE — 85025 COMPLETE CBC W/AUTO DIFF WBC: CPT | Mod: HCNC

## 2019-03-28 PROCEDURE — 80307 DRUG TEST PRSMV CHEM ANLYZR: CPT | Mod: HCNC

## 2019-03-28 PROCEDURE — 97161 PT EVAL LOW COMPLEX 20 MIN: CPT | Mod: HCNC

## 2019-03-28 PROCEDURE — 99217 PR OBSERVATION CARE DISCHARGE: CPT | Mod: HCNC,,, | Performed by: PHYSICIAN ASSISTANT

## 2019-03-28 RX ORDER — NAPROXEN SODIUM 220 MG/1
81 TABLET, FILM COATED ORAL DAILY
Refills: 0 | COMMUNITY
Start: 2019-03-28 | End: 2020-11-11

## 2019-03-28 RX ADMIN — TAMSULOSIN HYDROCHLORIDE 0.4 MG: 0.4 CAPSULE ORAL at 08:03

## 2019-03-28 RX ADMIN — MELOXICAM 15 MG: 7.5 TABLET ORAL at 08:03

## 2019-03-28 RX ADMIN — LOVASTATIN 20 MG: 20 TABLET ORAL at 08:03

## 2019-03-28 RX ADMIN — VITAMIN D, TAB 1000IU (100/BT) 1000 UNITS: 25 TAB at 08:03

## 2019-03-28 RX ADMIN — VERAPAMIL HYDROCHLORIDE 240 MG: 240 TABLET, FILM COATED, EXTENDED RELEASE ORAL at 08:03

## 2019-03-28 RX ADMIN — FINASTERIDE 5 MG: 5 TABLET, FILM COATED ORAL at 08:03

## 2019-03-28 NOTE — PLAN OF CARE
[facility]    HOME HEALTH ORDERS  FACE TO FACE ENCOUNTER    Patient Name: Kevyn Leach  YOB: 1931    PCP: Francisco Lindquist MD   PCP [address] [facility] [address]  PCP Phone Number: [phone]  PCP Fax: [phone]    Encounter Date: 03/28/2019    Admit to Home Health    Diagnoses:  Active Hospital Problems    Diagnosis  POA    *Encephalopathy acute [G93.40]  Yes    GUNNER (acute kidney injury) [N17.9]  Yes    Essential hypertension [I10]  Yes     Chronic    Hyperlipidemia [E78.5]  Yes     Chronic    GERD with esophagitis [K21.0]  Yes     Chronic    Benign prostatic hyperplasia [N40.0]  Yes     Chronic      Resolved Hospital Problems   No resolved problems to display.       Future Appointments   Date Time Provider Department Center   4/3/2019 12:30 PM GURDEEP Bose [facility] daily W   6/26/2019  1:30 PM Francisco Lindquist [facility]     Follow-up Information     Francisco Lindquist MD.    Specialty:  Internal Medicine  Why:  is booked - please follow up in [facility].  Contact information:  [address]  [facility] [address]  [phone]             [facility] On 4/3/2019.    Why:  Hospital Follow-up Wednes day 4/3 @ 12:30pm  Contact information:  [address]  [facility] [address]  [phone]                     I have seen and examined this patient face to face today. My clinical findings that support the need for the home health skilled services and home bound status are the following:  Weakness/numbness causing balance and gait disturbance due to Weakness/Debility making it taxing to leave home.    Allergies:Review of patient's allergies indicates:  No Known Allergies    Diet: regular diet    Activities: activity as tolerated    Nursing:   SN to complete comprehensive assessment including routine vital signs. Instruct on disease process and s/s of complications to report to MD.  Review/verify medication list sent home with the patient at time of discharge  and instruct patient/caregiver as needed. Frequency may be adjusted depending on start of care date.    Notify MD if SBP > 160 or < 90; DBP > 90 or < 50; HR > 120 or < 50; Temp > 101; Other: increased confusion      CONSULTS:    Physical Therapy to evaluate and treat. Evaluate for home safety and equipment needs; Establish/upgrade home exercise program. Perform / instruct on therapeutic exercises, gait training, transfer training, and Range of Motion.  Occupational Therapy to evaluate and treat. Evaluate home environment for safety and equipment needs. Perform/Instruct on transfers, ADL training, ROM, and therapeutic exercises.   to evaluate for community resources/long-range planning.    MISCELLANEOUS CARE:  N/A    WOUND CARE ORDERS  n/a      Medications: Review discharge medications with patient and family and provide education.      Current Discharge Medication List      START taking these medications    Details   aspirin 81 MG Chew Take 1 tablet (81 mg total) by mouth once daily.  Refills: 0         CONTINUE these medications which have NOT CHANGED    Details   acetaminophen with codeine (TYLENOL-CODEINE #3 ORAL) Take by mouth.      donepezil (ARICEPT) 10 MG tablet Take 1 tablet (10 mg total) by mouth every evening.  Qty: 30 tablet, Refills: 11    Associated Diagnoses: Cognitive impairment      finasteride (PROSCAR) 5 mg tablet TAKE ONE TABLET BY MOUTH ONCE DAILY  Qty: 90 tablet, Refills: 3      gabapentin (NEURONTIN) 100 MG capsule Take 1 capsule (100 mg total) by mouth once daily.  Qty: 30 capsule, Refills: 3      lovastatin (MEVACOR) 20 MG tablet TAKE ONE TABLET BY MOUTH ONCE DAILY  Qty: 60 tablet, Refills: 6    Comments: Please consider 90 day supplies to promote better adherence  Associated Diagnoses: Hyperlipidemia      tamsulosin (FLOMAX) 0.4 mg Cp24 TAKE ONE CAPSULE BY MOUTH ONCE DAILY  Qty: 90 capsule, Refills:  12    Associated Diagnoses: Benign non-nodular prostatic hyperplasia without lower urinary tract symptoms; Benign prostatic hyperplasia      traMADol (ULTRAM) 50 mg tablet Take 50 mg by mouth every 6 (six) hours as needed for Pain.      triamterene-hydrochlorothiazide 37.5-25 mg (DYAZIDE) 37.5-25 mg per capsule TAKE ONE CAPSULE BY MOUTH ONCE DAILY  Qty: 90 capsule, Refills: 3    Associated Diagnoses: Essential hypertension      verapamil (CALAN-SR) 240 MG CR tablet TAKE ONE TABLET BY MOUTH ONCE DAILY  Qty: 90 tablet, Refills: 12    Associated Diagnoses: Essential hypertension      docusate sodium (COLACE) 100 MG capsule Take 1 capsule (100 mg total) by mouth 2 (two) times daily.  Qty: 60 capsule, Refills: 0      hydrocortisone 2.5 % cream Apply topically 2 (two) times daily.  Qty: 28 g, Refills: 1    Associated Diagnoses: Rash of face      !! meloxicam (MOBIC) 15 MG tablet Take 1 tablet (15 mg total) by mouth once daily.  Qty: 30 tablet, Refills: 5      !! meloxicam (MOBIC) 15 MG tablet TAKE ONE TABLET BY MOUTH ONCE DAILY  Qty: 30 tablet, Refills: 12    Comments: Please consider 90 day supplies to promote better adherence      polyethylene glycol (GLYCOLAX) 17 gram/dose powder Take 17 g by mouth once daily.  Qty: 595 g, Refills: 0       !! - Potential duplicate medications found. Please discuss with provider.          I certify that this patient is confined to his home and needs physical therapy and occupational therapy.

## 2019-03-28 NOTE — DISCHARGE INSTRUCTIONS
Stay hydrated!  PCP follow up  Home Health orders done    Avoid taking tramadol/tylenol #3 unless absolutely necessary

## 2019-03-28 NOTE — CARE UPDATE
03/28/2019      Kevyn REARDON Hany  [address] [facility] [address]          Hospital Medicine Dept.  [facility] [address] [facility] [id number] [phone] after hours  [phone] fax Kevyn Leach has been hospitalized at the [facility] since 3/26/2019.  Please excuse the patient from duties.  Patient may return on 3/29.  No restrictions.     Please contact me if you have any questions.                    Lesia Johnson PA-C  03/28/2019

## 2019-03-28 NOTE — NURSING
Discharge instructions given to patient and family.  Patient verbalized understanding and had no further questions.  Patient's IV removed and vital signs within normal limits.  Patient left floor on foot with daughter and friend.

## 2019-03-28 NOTE — PLAN OF CARE
Problem: Adult Inpatient Plan of Care  Goal: Plan of Care Review  Outcome: Ongoing (interventions implemented as appropriate)     03/28/19 0759   Plan of Care Review   Plan of Care Reviewed With patient       Comments: POC reviewed with pt. Pt acknowledged understanding of plan. All questions pertaining to POC answered. Denies any further questions at this time.  Safety precautions maintained, pt free from falls. Camera in room with elopement instructions. Pt up independently. Pt disoriented to year but not day and month. VS per flow sheet.

## 2019-03-28 NOTE — PT/OT/SLP PROGRESS
Physical Therapy Eval and Discharge      Kevyn REARDON Hany   MRN: [id number]      pt evaluated and discharge prior to goals being set. Therapy recommending pt d/c home no therapy needs.     Osmin Arita, PT, DPT  3/28/2019

## 2019-03-29 LAB — VIT B1 BLD-MCNC: 40 UG/L (ref 38–122)

## 2019-03-29 NOTE — ASSESSMENT & PLAN NOTE
- Continue verapamil 240mg daily.  Will hold Dyazide 37.5-25mg daily given GUNNER.; GUNNER resolved can resume dyazide at d/c

## 2019-03-29 NOTE — HOSPITAL COURSE
Pt admitted to O[facility] for increasing confusion. CTH/MRI brain reviewed by stroke team- no acute stroke but recommend daily ASA. Pt also found to have GUNNER on admission, resolved with IVF. Prior to discharge, pt is alert and mentation is at baseline per family. Initially had recommended PTOT consult, but patient eager to leave and ambulating without difficulty. Will order HHC, discussed with daughter. Stable for d/c with return precautions discussed with family and pt. No further questions.     PCP follow up. Encouraged hydration and limited use of PRN tramadol and tylenol 3 for pain

## 2019-03-29 NOTE — DISCHARGE SUMMARY
"[facility] Hospital Medicine  Discharge Summary      Patient Name: Kevyn Leach  MRN: [id number]  Admission Date: 3/26/2019  Hospital Length of Stay: 0 days  Discharge Date and Time: 3/28/2019 12:40 PM  Attending Physician: No att. providers found   Discharging Provider: Lesia Johnson PA-C  Primary Care Provider: Francisco Lindquist MD  [facility] Medicine Team: [facility] Lesia Johnson PA-C    HPI:   Patient is an 87 year old gentleman with a h/o HTN, HLD, BPH, and GERD.  He presents with altered mental status.  No family at bedside.  Per ED note, patient's granddaughter noticed that he was increasingly confused and "incoherent" this morning.  This persisted throughout the day.  She also reported decreased PO intake.  On exam, patient states that he came to the hospital due to "pain all over."  Pain is described as a burning, he particularly notes neck pain, bilateral shoulder pain and leg pain.  Patient states pain began after eating two breakfast sausages.  He noted left sided chest burning/pressure following his breakfast to ED doctor, but denies chest pain on this provider's exam.  Denies SOB, headache, dizziness, palpitations, fever/chills, N/V/D, cough, sore throat.  Complains of rhinitis.  Patient brought home medications with him and has prescriptions for Tylenol #3, Gabapentin and Tramadol, but unclear if he is taking those.      * No surgery found *      Hospital Course:   Pt admitted to OBS for increasing confusion. CTH/MRI brain reviewed by stroke team- no acute stroke but recommend daily ASA. Pt also found to have GUNNER on admission, resolved with IVF. Prior to discharge, pt is alert and mentation is at baseline per family. Initially had recommended PTOT consult, but patient eager to leave and ambulating without difficulty. Will order HHC, discussed with daughter. Stable for d/c with return precautions discussed with family and pt. No further questions.     PCP follow up. " Encouraged hydration and limited use of PRN tramadol and tylenol 3 for pain     Consults:   Consults (From admission, onward)        Status Ordering Provider     Case Management/  Once     Provider:  (Not yet assigned)    Completed ADELITA PASCUAL.          * Encephalopathy acute  - CT head showed Right pontine subtle hypoattenuating focus, which could represent artifact versus age-indeterminate lacunar type infarct, and changes of chronic small vessel ischemic disease and cerebral volume loss, with stable right cerebellar small remote lacunar type infarct.  - MRI showed punctate focus of subtle cortical increased diffusivity in the left frontal lobe with corresponding FLAIR hyperintensity possibly reflecting focus of recent/prior infarction noting there is no associated abnormal signal intensity on ADC maps.  No additional abnormal restricted diffusion to suggest acute infarction.  Clinical correlation advised.  Generalized cerebral volume loss, findings suggestive of chronic microvascular ischemic change and small remote right cerebellar infarct also noted.  - Imaging findings were discussed by ED provider with stroke team, who did not believe findings were consistent with acute stroke.  - UA unremarkable.  - Influenza negative.  Afebrile with no evidence of leukocytosis.  - Patient currently alert and oriented to person, place, month (not year). (baseline per family)  - Patient does complain of myalgias/arthralgias.  Continue Gabapentin 100mg qHS, Mobic 15mg daily.  - Has prescriptions for Tylenol #3 and Tramadol, but unclear if he is taking them.  Will hold for now and check Utox (-)  - Continue Aricept 10mg qHS.  - PT/OT, SW consult.  Neuro checks q4hrs.   However pt eager to d/c, ambulating without difficulty; will d/c with [facility]. Discussed with daughter who is agreeable  Pt stable for dc, mentation at baseline    [facility] BEHAVIOR MANAGEMENT  - Minimize use of restraints; if physical  restraints necessary, please utilize medical/chemical prns for agitation.  - Keep shades open and room lit during day and room dim at night in order to promote healthy circadian rhythms.  - Encourage family at bedside  - Keep whiteboard in patient's room current with the date and name of the members of patient's team for easy patient self re-orientation.  - Avoid benzodiazepines, antihistamines, anticholinergics, hypnotics, and minimize opiates while controlling for pain as these medications may exacerbate delirium.      GUNNER (acute kidney injury)  - Creatinine 1.5 up from 1.0 on 2/16/2018, GFR down to 47.7 from >60.0.  - Creatinine improved to 1.3 following NS bolus 1L in ED.  Will give cautious second liter.  - UA unremarkable.  Family notes decreased PO intake.  - Hold Dyazide.    Cr improved 1.2 @ D/C; encouraged hydration. Can resume dyazide      Essential hypertension  - Continue verapamil 240mg daily.  Will hold Dyazide 37.5-25mg daily given GUNNER.; GUNNER resolved can resume dyazide at d/c      Hyperlipidemia  - Continue lovastatin 20mg daily.      GERD with esophagitis  - Will give GI cocktail.  - Given complaints of chest pain to ED provider, will trend troponins, flat.  No acute changes on ECG.    Benign prostatic hyperplasia  - Continue finasteride 5mg daily, tamsulosin 0.4mg daily.        Final Active Diagnoses:    Diagnosis Date Noted POA    PRINCIPAL PROBLEM:  Encephalopathy acute [G93.40] 03/27/2019 Yes    GUNNER (acute kidney injury) [N17.9] 03/27/2019 Yes    Essential hypertension [I10] 07/31/2017 Yes     Chronic    Hyperlipidemia [E78.5] 11/07/2016 Yes     Chronic    GERD with esophagitis [K21.0] 03/02/2016 Yes     Chronic    Benign prostatic hyperplasia [N40.0]  Yes     Chronic      Problems Resolved During this Admission:       Discharged Condition: stable    Disposition: Home or Self Care    Follow Up:  Follow-up Information     Francisco Lindquist MD.    Specialty:  Internal Medicine  Why:  is booked  - please follow up in [facility].  Contact information:  [address]  [place] [address]  [phone]             [facility] On 4/3/2019.    Why:  Hospital Follow-up Wednes day 4/3 @ 12:30pm  Contact information:  [address]  [place] [address]  [phone]                 Patient Instructions:      No driving until:   Order Comments: No driving till cleared by PCP     Notify your health care provider if you experience any of the following:  temperature >100.4     Notify your health care provider if you experience any of the following:  redness, tenderness, or signs of infection (pain, swelling, redness, odor or green/yellow discharge around incision site)     Notify your health care provider if you experience any of the following:  worsening rash     Notify your health care provider if you experience any of the following:  increased confusion or weakness     Activity as tolerated       Pending Diagnostic Studies:     Procedure Component Value Units Date/Time    Vitamin B1 [[id number]] Collected:  03/27/19 0735    Order Status:  Sent Lab Status:  In process Updated:  03/27/19 0826    Specimen:  Blood          Medications:  Reconciled Home Medications:      Medication List      START taking these medications    aspirin 81 MG Chew  Take 1 tablet (81 mg total) by mouth once daily.        CHANGE how you take these medications    gabapentin 100 MG capsule  Commonly known as:  NEURONTIN  Take 1 capsule (100 mg total) by mouth once daily.  What changed:  when to take this     triamterene-hydrochlorothiazide 37.5-25 mg 37.5-25 mg per capsule  Commonly known as:  DYAZIDE  TAKE ONE CAPSULE BY MOUTH ONCE DAILY  What changed:    · how much to take  · how to take this  · when to take this        CONTINUE taking these medications    docusate sodium 100 MG capsule  Commonly known as:  COLACE  Take 1 capsule (100 mg total) by mouth 2 (two) times daily.     donepezil 10 MG  tablet  Commonly known as:  ARICEPT  Take 1 tablet (10 mg total) by mouth every evening.     finasteride 5 mg tablet  Commonly known as:  PROSCAR  TAKE ONE TABLET BY MOUTH ONCE DAILY     hydrocortisone 2.5 % cream  Apply topically 2 (two) times daily.     lovastatin 20 MG tablet  Commonly known as:  MEVACOR  TAKE ONE TABLET BY MOUTH ONCE DAILY     * meloxicam 15 MG tablet  Commonly known as:  MOBIC  Take 1 tablet (15 mg total) by mouth once daily.     * meloxicam 15 MG tablet  Commonly known as:  MOBIC  TAKE ONE TABLET BY MOUTH ONCE DAILY     polyethylene glycol 17 gram/dose powder  Commonly known as:  GLYCOLAX  Take 17 g by mouth once daily.     tamsulosin 0.4 mg Cap  Commonly known as:  FLOMAX  TAKE ONE CAPSULE BY MOUTH ONCE DAILY     traMADol 50 mg tablet  Commonly known as:  ULTRAM  Take 50 mg by mouth every 6 (six) hours as needed for Pain.     TYLENOL-CODEINE #3 ORAL  Take by mouth.     verapamil 240 MG CR tablet  Commonly known as:  CALAN-SR  TAKE ONE TABLET BY MOUTH ONCE DAILY         * This list has 2 medication(s) that are the same as other medications prescribed for you. Read the directions carefully, and ask your doctor or other care provider to review them with you.                Indwelling Lines/Drains at time of discharge:   Lines/Drains/Airways          None          Time spent on the discharge of patient: >35 minutes  Patient was seen and examined on the date of discharge and determined to be suitable for discharge.       Discussed withstaff Lesia Johnson PA-C  Department of Hospital Medicine  [facility]

## 2019-03-29 NOTE — ASSESSMENT & PLAN NOTE
- CT head showed Right pontine subtle hypoattenuating focus, which could represent artifact versus age-indeterminate lacunar type infarct, and changes of chronic small vessel ischemic disease and cerebral volume loss, with stable right cerebellar small remote lacunar type infarct.  - MRI showed punctate focus of subtle cortical increased diffusivity in the left frontal lobe with corresponding FLAIR hyperintensity possibly reflecting focus of recent/prior infarction noting there is no associated abnormal signal intensity on ADC maps.  No additional abnormal restricted diffusion to suggest acute infarction.  Clinical correlation advised.  Generalized cerebral volume loss, findings suggestive of chronic microvascular ischemic change and small remote right cerebellar infarct also noted.  - Imaging findings were discussed by ED provider with stroke team, who did not believe findings were consistent with acute stroke.  - UA unremarkable.  - Influenza negative.  Afebrile with no evidence of leukocytosis.  - Patient currently alert and oriented to person, place, month (not year). (baseline per family)  - Patient does complain of myalgias/arthralgias.  Continue Gabapentin 100mg qHS, Mobic 15mg daily.  - Has prescriptions for Tylenol #3 and Tramadol, but unclear if he is taking them.  Will hold for now and check Utox (-)  - Continue Aricept 10mg qHS.  - PT/OT, SW consult.  Neuro checks q4hrs.   However pt eager to d/c, ambulating without difficulty; will d/c with [facility]. Discussed with daughter who is agreeable  Pt stable for dc, mentation at baseline    [facility] BEHAVIOR MANAGEMENT  - Minimize use of restraints; if physical restraints necessary, please utilize medical/chemical prns for agitation.  - Keep shades open and room lit during day and room dim at night in order to promote healthy circadian rhythms.  - Encourage family at bedside  - Keep whiteboard in patient's room current with the date and name of the members of  patient's team for easy patient self re-orientation.  - Avoid benzodiazepines, antihistamines, anticholinergics, hypnotics, and minimize opiates while controlling for pain as these medications may exacerbate delirium.

## 2019-03-29 NOTE — ASSESSMENT & PLAN NOTE
- Creatinine 1.5 up from 1.0 on 2/16/2018, GFR down to 47.7 from >60.0.  - Creatinine improved to 1.3 following NS bolus 1L in ED.  Will give cautious second liter.  - UA unremarkable.  Family notes decreased PO intake.  - Hold Dyazide.    Cr improved 1.2 @ D/C; encouraged hydration. Can resume dyazide

## 2019-03-31 NOTE — PROGRESS NOTES
"[facility]  Follow-up Visit Progress Note     PRESENTING HISTORY     PCP: Francisco Lindquist MD  Chief Complaint/Reason for Visit:  Follow up from recent visit.    No chief complaint on file.    Lesia Johnson PA-C   Physician Assistant   Hospital Medicine   Discharge Summary   Signed                             []Hide copied text    []Hover for details      [facility] Hospital Medicine  Discharge Summary        Patient Name: Kevyn Leach  MRN: [id number]  Admission Date: 3/26/2019  Hospital Length of Stay: 0 days  Discharge Date and Time: 3/28/2019 12:40 PM  Attending Physician: No att. providers found   Discharging Provider: Lesia Johnson PA-C  Primary Care Provider: Francisco Lindquist MD  [facility] Medicine Team: [facility] [place] Lesia Johnson PA-C     HPI:   Patient is an 87 year old gentleman with a h/o HTN, HLD, BPH, and GERD.  He presents with altered mental status.  No family at bedside.  Per ED note, patient's granddaughter noticed that he was increasingly confused and "incoherent" this morning.  This persisted throughout the day.  She also reported decreased PO intake.  On exam, patient states that he came to the hospital due to "pain all over."  Pain is described as a burning, he particularly notes neck pain, bilateral shoulder pain and leg pain.  Patient states pain began after eating two breakfast sausages.  He noted left sided chest burning/pressure following his breakfast to ED doctor, but denies chest pain on this provider's exam.  Denies SOB, headache, dizziness, palpitations, fever/chills, N/V/D, cough, sore throat.  Complains of rhinitis.  Patient brought home medications with him and has prescriptions for Tylenol #3, Gabapentin and Tramadol, but unclear if he is taking those.        * No surgery found *       Hospital Course:   Pt admitted to OBS for increasing confusion. CTH/MRI brain reviewed by stroke team- no acute stroke but recommend daily ASA. Pt also found to " have GUNNER on admission, resolved with IVF. Prior to discharge, pt is alert and mentation is at baseline per family. Initially had recommended PTOT consult, but patient eager to leave and ambulating without difficulty. Will order HHC, discussed with daughter. Stable for d/c with return precautions discussed with family and pt. No further questions.      PCP follow up. Encouraged hydration and limited use of PRN tramadol and tylenol 3 for pain      Consults:   Consults (From admission, onward)         Status Ordering Provider       Case Management/  Once     Provider:  (Not yet assigned)    ADELITA Linda             * Encephalopathy acute  - CT head showed Right pontine subtle hypoattenuating focus, which could represent artifact versus age-indeterminate lacunar type infarct, and changes of chronic small vessel ischemic disease and cerebral volume loss, with stable right cerebellar small remote lacunar type infarct.  - MRI showed punctate focus of subtle cortical increased diffusivity in the left frontal lobe with corresponding FLAIR hyperintensity possibly reflecting focus of recent/prior infarction noting there is no associated abnormal signal intensity on ADC maps.  No additional abnormal restricted diffusion to suggest acute infarction.  Clinical correlation advised.  Generalized cerebral volume loss, findings suggestive of chronic microvascular ischemic change and small remote right cerebellar infarct also noted.  - Imaging findings were discussed by ED provider with stroke team, who did not believe findings were consistent with acute stroke.  - UA unremarkable.  - Influenza negative.  Afebrile with no evidence of leukocytosis.  - Patient currently alert and oriented to person, place, month (not year). (baseline per family)  - Patient does complain of myalgias/arthralgias.  Continue Gabapentin 100mg qHS, Mobic 15mg daily.  - Has prescriptions for Tylenol #3 and Tramadol, but unclear  if he is taking them.  Will hold for now and check Utox (-)  - Continue Aricept 10mg qHS.  - PT/OT, SW consult.  Neuro checks q4hrs.          However pt eager to d/c, ambulating without difficulty; will [facility] with [facility]. Discussed with daughter who is agreeable  Pt stable for dc, mentation at baseline     DELIRIUM BEHAVIOR MANAGEMENT  - Minimize use of restraints; if physical restraints necessary, please utilize medical/chemical prns for agitation.  - Keep shades open and room lit during day and room dim at night in order to promote healthy circadian rhythms.  - Encourage family at bedside  - Keep whiteboard in patient's room current with the date and name of the members of patient's team for easy patient self re-orientation.  - Avoid benzodiazepines, antihistamines, anticholinergics, hypnotics, and minimize opiates while controlling for pain as these medications may exacerbate delirium.        GUNNER (acute kidney injury)  - Creatinine 1.5 up from 1.0 on 2/16/2018, GFR down to 47.7 from >60.0.  - Creatinine improved to 1.3 following NS bolus 1L in ED.  Will give cautious second liter.  - UA unremarkable.  Family notes decreased PO intake.  - Hold Dyazide.     Cr improved 1.2 @ D/C; encouraged hydration. Can resume dyazide        Essential hypertension  - Continue verapamil 240mg daily.  Will hold Dyazide 37.5-25mg daily given GUNNER.; GUNNER resolved can resume dyazide at d/c        Hyperlipidemia  - Continue lovastatin 20mg daily.        GERD with esophagitis  - Will give GI cocktail.  - Given complaints of chest pain to ED provider, will trend troponins, flat.  No acute changes on ECG.     Benign prostatic hyperplasia  - Continue finasteride 5mg daily, tamsulosin 0.4mg daily.                  Final Active Diagnoses:     Diagnosis Date Noted POA    PRINCIPAL PROBLEM:  Encephalopathy acute [G93.40] 03/27/2019 Yes    GUNNER (acute kidney injury) [N17.9] 03/27/2019 Yes    Essential hypertension [I10] 07/31/2017 Yes       Chronic     Hyperlipidemia [E78.5] 11/07/2016 Yes       Chronic    GERD with esophagitis [K21.0] 03/02/2016 Yes       Chronic    Benign prostatic hyperplasia [N40.0]   Yes       Chronic       Problems Resolved During this Admission:         Discharged Condition: stable     Disposition: Home or Self Care     Follow Up:      Follow-up Information      Francisco Lindquist MD.    Specialty:  Internal Medicine  Why:  is booked - please [facility].  Contact information:  [address]  [place] [address]  [phone]                 [facility] On 4/3/2019.    Why:  Hospital Follow-up Wednes day 4/3 @ 12:30pm  Contact information:  [address]  [place] [address]  [phone]                      Patient Instructions:           No driving until:   Order Comments: No driving till cleared by PCP      Notify your health care provider if you experience any of the following:  temperature >100.4      Notify your health care provider if you experience any of the following:  redness, tenderness, or signs of infection (pain, swelling, redness, odor or green/yellow discharge around incision site)      Notify your health care provider if you experience any of the following:  worsening rash      Notify your health care provider if you experience any of the following:  increased confusion or weakness      Activity as tolerated                  Pending Diagnostic Studies:      Procedure Component Value Units Date/Time     Vitamin B1 [[id number]] Collected:  03/27/19 0735     Order Status:  Sent Lab Status:  In process Updated:  03/27/19 0826     Specimen:  Blood            Medications:  Reconciled Home Medications:       Medication List       START taking these medications    aspirin 81 MG Chew  Take 1 tablet (81 mg total) by mouth once daily.          CHANGE how you take these medications    gabapentin 100 MG capsule  Commonly known as:  NEURONTIN  Take 1 capsule (100 mg total) by mouth  once daily.  What changed:  when to take this      triamterene-hydrochlorothiazide 37.5-25 mg 37.5-25 mg per capsule  Commonly known as:  DYAZIDE  TAKE ONE CAPSULE BY MOUTH ONCE DAILY  What changed:    · how much to take  · how to take this  · when to take this          CONTINUE taking these medications    docusate sodium 100 MG capsule  Commonly known as:  COLACE  Take 1 capsule (100 mg total) by mouth 2 (two) times daily.      donepezil 10 MG tablet  Commonly known as:  ARICEPT  Take 1 tablet (10 mg total) by mouth every evening.      finasteride 5 mg tablet  Commonly known as:  PROSCAR  TAKE ONE TABLET BY MOUTH ONCE DAILY      hydrocortisone 2.5 % cream  Apply topically 2 (two) times daily.      lovastatin 20 MG tablet  Commonly known as:  MEVACOR  TAKE ONE TABLET BY MOUTH ONCE DAILY      * meloxicam 15 MG tablet  Commonly known as:  MOBIC  Take 1 tablet (15 mg total) by mouth once daily.      * meloxicam 15 MG tablet  Commonly known as:  MOBIC  TAKE ONE TABLET BY MOUTH ONCE DAILY      polyethylene glycol 17 gram/dose powder  Commonly known as:  GLYCOLAX  Take 17 g by mouth once daily.      tamsulosin 0.4 mg Cap  Commonly known as:  FLOMAX  TAKE ONE CAPSULE BY MOUTH ONCE DAILY      traMADol 50 mg tablet  Commonly known as:  ULTRAM  Take 50 mg by mouth every 6 (six) hours as needed for Pain.      TYLENOL-CODEINE #3 ORAL  Take by mouth.      verapamil 240 MG CR tablet  Commonly known as:  CALAN-SR  TAKE ONE TABLET BY MOUTH ONCE DAILY           * This list has 2 medication(s) that are the same as other medications prescribed for you. Read the directions carefully, and ask your doctor or other care provider to review them with you.                    Indwelling Lines/Drains at time of discharge:       Lines/Drains/Airways            None             Time spent on the discharge of patient: >35 minutes  Patient was seen and examined on the date of discharge and determined to be suitable for discharge.        Discussed  "withaff  Lesia Johnson PA-C  Department of Hospital Medicine  [facility]          Cosigned by: Mattie Harvey MD at 3/29/2019  6:09 PM   Electronically signed by Lesia Johnson PA-C at 3/28/2019  9:40 PM  Electronically signed by Mattie Harvey MD at 3/29/2019  6:09 PM       ED to Hosp-Admission (Discharged) on 3/26/2019            Revision History            Detailed Report          History of Present Illness & ROS: Mr. Kevyn Leach is a 87 y.o. male.  Here today with his granddaughter, Carmela.   He has no new or acute complaints. Has been doing well since most recent discharge. His grand-dtr is Power of  and very engaged with his medical care.   She prepares his pill box and will take to all clinic apts, when able.   Since most recent discharge, have reported, no dizziness, no falls, no change in LOC or obvious moments of confusion, and no "new or acute" complaints, except for "chronic joint pains".   He is ambulatory, w/o assistive aide and will "still drive a couple of blocks to get haircut".   He resides with wife.     Review of Systems:  Eyes: denies visual changes at this time denies floaters   ENT: no nasal congestion or sore throat  Respiratory: no cough or shorness of breath  Cardiovascular: no chest pain or palpitations  Gastrointestinal: no nausea or vomiting, no abdominal pain or change in bowel habits  Genitourinary: no hematuria or dysuria; denies frequency  Hematologic/Lymphatic: no easy bruising or lymphadenopathy  Musculoskeletal: no arthralgias or myalgias  Neurological: no seizures or tremors  Endocrine: no heat or cold intolerance      PAST HISTORY:     Past Medical History:   Diagnosis Date    Amblyopia     refractive amblyopia left eye ()    Arthritis     BPH (benign prostatic hypertrophy)     Colon polyp     Generalized pain     Hyperlipidemia     Hypertension     URI (upper respiratory infection)        Past Surgical History: "   Procedure Laterality Date    CATARACT EXTRACTION W/  INTRAOCULAR LENS IMPLANT Right n/a    done at [facility] before     CATARACT EXTRACTION W/  INTRAOCULAR LENS IMPLANT Left 2016    OS     EYE SURGERY      INSERTION-INTRAOCULAR LENS (IOL) Left 2016    [facility]    PHACOEMULSIFICATION-ASPIRATION-CATARACT Left 2016    [facility] at [facility]    PROSTATE SURGERY      TRANSURETHRAL RESECTION OF PROSTATE         Family History   Problem Relation Age of Onset    Diabetes Father     Diabetes Paternal Aunt     Cancer Brother     No Known Problems Mother     Heart disease Neg Hx     Amblyopia Neg Hx     Blindness Neg Hx     Cataracts Neg Hx     Glaucoma Neg Hx     Hypertension Neg Hx     Macular degeneration Neg Hx     Retinal detachment Neg Hx     Strabismus Neg Hx     Stroke Neg Hx     Thyroid disease Neg Hx        Social History     Socioeconomic History    Marital status:      Spouse name: Not on file    Number of children: Not on file    Years of education: Not on file    Highest education level: Not on file   Occupational History    Not on file   Social Needs    Financial resource strain: Not on file    Food insecurity:     Worry: Not on file     Inability: Not on file    Transportation needs:     Medical: Not on file     Non-medical: Not on file   Tobacco Use    Smoking status: Former Smoker     Last attempt to quit: 1987     Years since quittin.5    Smokeless tobacco: Never Used   Substance and Sexual Activity    Alcohol use: No    Drug use: No    Sexual activity: Never   Lifestyle    Physical activity:     Days per week: Not on file     Minutes per session: Not on file    Stress: Not on file   Relationships    Social connections:     Talks on phone: Not on file     Gets together: Not on file     Attends [employer] service: Not on file     Active member  of club or organization: Not on file     Attends meetings of clubs or organizations: Not on file     Relationship status: Not on file    Intimate partner violence:     Fear of current or ex partner: Not on file     Emotionally abused: Not on file     Physically abused: Not on file     Forced sexual activity: Not on file   Other Topics Concern    Not on file   Social History Narrative    Lives with wife; 3 kids; 10 grandkids; 5 great grandkids    +driving day and night       MEDICATIONS & ALLERGIES:     Current Outpatient Medications on File Prior to Visit   Medication Sig Dispense Refill    acetaminophen with codeine (TYLENOL-CODEINE #3 ORAL) Take by mouth.      aspirin 81 MG Chew Take 1 tablet (81 mg total) by mouth once daily.  0    docusate sodium (COLACE) 100 MG capsule Take 1 capsule (100 mg total) by mouth 2 (two) times daily. 60 capsule 0    donepezil (ARICEPT) 10 MG tablet Take 1 tablet (10 mg total) by mouth every evening. 30 tablet 11    finasteride (PROSCAR) 5 mg tablet TAKE ONE TABLET BY MOUTH ONCE DAILY 90 tablet 3    gabapentin (NEURONTIN) 100 MG capsule Take 1 capsule (100 mg total) by mouth once daily. (Patient taking differently: Take 100 mg by mouth every evening. ) 30 capsule 3    hydrocortisone 2.5 % cream Apply topically 2 (two) times daily. 28 g 1    lovastatin (MEVACOR) 20 MG tablet TAKE ONE TABLET BY MOUTH ONCE DAILY 60 tablet 6    meloxicam (MOBIC) 15 MG tablet Take 1 tablet (15 mg total) by mouth once daily. 30 tablet 5    meloxicam (MOBIC) 15 MG tablet TAKE ONE TABLET BY MOUTH ONCE DAILY 30 tablet 12    polyethylene glycol (GLYCOLAX) 17 gram/dose powder Take 17 g by mouth once daily. 595 g 0    tamsulosin (FLOMAX) 0.4 mg Cp24 TAKE ONE CAPSULE BY MOUTH ONCE DAILY 90 capsule 12    traMADol (ULTRAM) 50 mg tablet Take 50 mg by mouth every 6 (six) hours as needed for Pain.      triamterene-hydrochlorothiazide 37.5-25 mg (DYAZIDE) 37.5-25 mg per capsule TAKE ONE CAPSULE BY MOUTH  ONCE DAILY (Patient taking differently: TAKE ONE CAPSULE BY MOUTH ONCE DAILY takes at noon) 90 capsule 3    verapamil (CALAN-SR) 240 MG CR tablet TAKE ONE TABLET BY MOUTH ONCE DAILY 90 tablet 12     No current facility-administered medications on file prior to visit.         Review of patient's allergies indicates:  No Known Allergies    Medications Reconciliation:   I have reconciled the patient's home medications and discharge medications with the patient/family. I have updated all changes.  Refer to After-Visit Medication List.    OBJECTIVE:     Vital Signs:  There were no vitals filed for this visit.  Wt Readings from Last 1 Encounters:   03/27/19 1754 68.9 kg (151 lb 14.4 oz)   03/26/19 1816 68 kg (150 lb)     There is no height or weight on file to calculate BMI.     Wt Readings from Last 3 Encounters:   04/03/19 69 kg (152 lb 1.9 oz)   03/27/19 68.9 kg (151 lb 14.4 oz)   02/01/19 71.3 kg (157 lb 3 oz)     Temp Readings from Last 3 Encounters:   03/28/19 98.3 °F (36.8 °C) (Oral)   01/22/19 98.8 °F (37.1 °C) (Oral)   12/06/18 98.4 °F (36.9 °C)     BP Readings from Last 3 Encounters:   04/03/19 134/74   03/28/19 132/62   02/01/19 138/70     Pulse Readings from Last 3 Encounters:   04/03/19 75   03/28/19 71   02/01/19 65         Physical Exam:  General: Well developed, well nourished. No distress.  HEENT: Head is normocephalic, atraumatic; ears are normal.   Eyes: Clear conjunctiva.  Neck: Supple, symmetrical neck; trachea midline.  Lungs: Clear to auscultation bilaterally and normal respiratory effort.  Cardiovascular: Heart with regular rate and rhythm. No murmurs, gallops or rubs  Extremities: No LE edema. Pulses 2+ and symmetric.   Abdomen: Abdomen is soft, non-tender non-distended with normal bowel sounds.  Skin: Skin color, texture, turgor normal. No rashes.  Musculoskeletal: Normal gait.   Lymph Nodes: No cervical or supraclavicular adenopathy.  Neurologic: No focal numbness or weakness.   Psychiatric:  "Not depressed.      Laboratory  Lab Results   Component Value Date    WBC 6.11 03/28/2019    HGB 12.0 (L) 03/28/2019    HCT 38.6 (L) 03/28/2019     03/28/2019    CHOL 158 04/30/2015    TRIG 104 04/30/2015    HDL 45 04/30/2015    ALT 7 (L) 03/28/2019    AST 14 03/28/2019     03/28/2019    K 4.0 03/28/2019     03/28/2019    CREATININE 1.2 03/28/2019    BUN 20 03/28/2019    CO2 27 03/28/2019    TSH 1.199 03/27/2019    PSA 4.5 (H) 05/15/2014    INR 1.0 08/13/2014    HGBA1C 5.5 03/27/2019           ASSESSMENT & PLAN:     HIGH RISK CONDITION(S):        Recent admission for Acute Encephalopathy in the setting of unclear etiology, w/u essentially negative:   (? Underlying Cognitive Impairment)  ?? Opioid Use may have challenged the picture: on Tylenol #3 and Tramadol  CT / MRI: + Flair in Left frontal lobe with ?of possible recent / prior infarction; seen by Neuro Vascular on admit, did not feel an "acute" CVA.   UA: negative  CBC: negative  Flu: negative  ` continue Aricept     *Today, reviewed his home meds in length with his family member; he has not been taking the Tylenol #3 nor the Tramadol, and of note, Tox screen was negative on admit.   Have discontinued this from his MAR.   He is pain free today on the current regimen:   ` continue Mobic 15 mg daily   ` continue Neurontin 100 nightly       Chronic Arthralgias:  Noted Tylenol #3 and Ultram on chart.   *Today, reviewed his home meds in length with his family member; he has not been taking the Tylenol #3 nor the Tramadol, and of note, Tox screen was negative on admit.   Have discontinued this from his MAR.   He is pain free today on the current regimen:   ` continue Mobic 15 mg daily (serum creat stable and permissible)  ` continue Neurontin 100 nightly       Recent admission with an GUNNER:   Corrected prior to discharge   (1.5 on admit and 1.2 at discharge)      Essential Hypertension:   *Goal: < 130/90  Today: 134/74 (controlled)   ` continue " Verapamil  ` continue Dyazide       HLP:   Lab Results   Component Value Date    CHOL 158 04/30/2015    CHOL 181 05/15/2014    CHOL 152 05/29/2013     Lab Results   Component Value Date    HDL 45 04/30/2015    HDL 44 05/15/2014    HDL 38 (L) 05/29/2013     Lab Results   Component Value Date    LDLCALC 92.2 04/30/2015    LDLCALC 104.6 05/15/2014    LDLCALC 83.0 05/29/2013     Lab Results   Component Value Date    TRIG 104 04/30/2015    TRIG 162 (H) 05/15/2014    TRIG 154 (H) 05/29/2013     Lab Results   Component Value Date    CHOLHDL 28.5 04/30/2015    CHOLHDL 24.3 05/15/2014    CHOLHDL 25.0 05/29/2013   Continue current statin therapy       BPH:   (controlled)  Proscar  Flomax      [facility] Visit (Post Discharge Follow-up) Today:   - Our clinic physicians and nurses plan to follow the patient up for any medical issues in the [facility] for 30 days post discharge.    Future Appointments   Date Time Provider Department Center   6/26/2019  1:30 PM Francisco Lindquist MD [facility] [place]        Medication List           Accurate as of 4/3/19  1:27 PM. If you have any questions, ask your nurse or doctor.               START taking these medications    fluticasone 50 mcg/actuation nasal spray  Commonly known as:  FLONASE  1 spray (50 mcg total) by Each Nare route once daily.  Started by:  GURDEEP Angulo        CHANGE how you take these medications    docusate sodium 100 MG capsule  Commonly known as:  COLACE  Take 1 capsule (100 mg total) by mouth 2 (two) times daily as needed for Constipation.  What changed:    · when to take this  · reasons to take this  Changed by:  GURDEEP Angulo     meloxicam 15 MG tablet  Commonly known as:  MOBIC  Take 1 tablet (15 mg total) by mouth once daily.  What changed:  Another medication with the same name was removed. Continue taking this medication, and follow the directions you see here.  Changed by:  GURDEEP Angulo      triamterene-hydrochlorothiazide 37.5-25 mg 37.5-25 mg per capsule  Commonly known as:  DYAZIDE  TAKE ONE CAPSULE BY MOUTH ONCE DAILY takes at noon  What changed:    · how much to take  · how to take this  · when to take this  · additional instructions  Changed by:  GURDEEP Angulo        CONTINUE taking these medications    aspirin 81 MG Chew  Take 1 tablet (81 mg total) by mouth once daily.     donepezil 10 MG tablet  Commonly known as:  ARICEPT  Take 1 tablet (10 mg total) by mouth every evening.     finasteride 5 mg tablet  Commonly known as:  PROSCAR  TAKE ONE TABLET BY MOUTH ONCE DAILY     gabapentin 100 MG capsule  Commonly known as:  NEURONTIN  Take 1 capsule (100 mg total) by mouth every evening.     lovastatin 20 MG tablet  Commonly known as:  MEVACOR  TAKE ONE TABLET BY MOUTH ONCE DAILY     polyethylene glycol 17 gram/dose powder  Commonly known as:  GLYCOLAX  Take 17 g by mouth once daily.     tamsulosin 0.4 mg Cap  Commonly known as:  FLOMAX  TAKE ONE CAPSULE BY MOUTH ONCE DAILY     verapamil 240 MG CR tablet  Commonly known as:  CALAN-SR  TAKE ONE TABLET BY MOUTH ONCE DAILY        STOP taking these medications    hydrocortisone 2.5 % cream  Stopped by:  GURDEEP Angulo     traMADol 50 mg tablet  Commonly known as:  ULTRAM  Stopped by:  GURDEEP Angulo     TYLENOL-CODEINE #3 ORAL  Stopped by:  GURDEEP Angulo           Where to Get Your Medications      These medications were sent to [facility] [place] [place] - [address]  [address] [place]) [place] [address]    Phone:  [phone]   · fluticasone 50 mcg/actuation nasal spray  · gabapentin 100 MG capsule     Information about where to get these medications is not yet available    Ask your nurse or doctor about these medications  · docusate sodium 100 MG capsule  · triamterene-hydrochlorothiazide 37.5-25 mg 37.5-25 mg per capsule         Signing Physician:  Nora BENEDICT  GURDEEP Reyes

## 2019-04-03 ENCOUNTER — OFFICE VISIT (OUTPATIENT)
Dept: PRIMARY CARE CLINIC | Facility: CLINIC | Age: 84
End: 2019-04-03
Payer: MEDICARE

## 2019-04-03 VITALS
DIASTOLIC BLOOD PRESSURE: 74 MMHG | HEART RATE: 75 BPM | SYSTOLIC BLOOD PRESSURE: 134 MMHG | OXYGEN SATURATION: 95 % | WEIGHT: 152.13 LBS | BODY MASS INDEX: 22.53 KG/M2 | HEIGHT: 69 IN

## 2019-04-03 DIAGNOSIS — E78.1 PURE HYPERGLYCERIDEMIA: Chronic | ICD-10-CM

## 2019-04-03 DIAGNOSIS — I10 ESSENTIAL HYPERTENSION: Chronic | ICD-10-CM

## 2019-04-03 DIAGNOSIS — N40.0 BENIGN PROSTATIC HYPERPLASIA, UNSPECIFIED WHETHER LOWER URINARY TRACT SYMPTOMS PRESENT: Chronic | ICD-10-CM

## 2019-04-03 DIAGNOSIS — G93.40 ENCEPHALOPATHY ACUTE: Primary | ICD-10-CM

## 2019-04-03 PROCEDURE — 99999 PR PBB SHADOW E&M-EST. PATIENT-LVL IV: CPT | Mod: PBBFAC,,, | Performed by: NURSE PRACTITIONER

## 2019-04-03 PROCEDURE — 99214 OFFICE O/P EST MOD 30 MIN: CPT | Mod: S$GLB,,, | Performed by: NURSE PRACTITIONER

## 2019-04-03 PROCEDURE — 99499 RISK ADDL DX/OHS AUDIT: ICD-10-PCS | Mod: S$GLB,,, | Performed by: NURSE PRACTITIONER

## 2019-04-03 PROCEDURE — 99214 PR OFFICE/OUTPT VISIT, EST, LEVL IV, 30-39 MIN: ICD-10-PCS | Mod: S$GLB,,, | Performed by: NURSE PRACTITIONER

## 2019-04-03 PROCEDURE — 99499 UNLISTED E&M SERVICE: CPT | Mod: S$GLB,,, | Performed by: NURSE PRACTITIONER

## 2019-04-03 PROCEDURE — 1101F PT FALLS ASSESS-DOCD LE1/YR: CPT | Mod: CPTII,S$GLB,, | Performed by: NURSE PRACTITIONER

## 2019-04-03 PROCEDURE — 1101F PR PT FALLS ASSESS DOC 0-1 FALLS W/OUT INJ PAST YR: ICD-10-PCS | Mod: CPTII,S$GLB,, | Performed by: NURSE PRACTITIONER

## 2019-04-03 PROCEDURE — 99999 PR PBB SHADOW E&M-EST. PATIENT-LVL IV: ICD-10-PCS | Mod: PBBFAC,,, | Performed by: NURSE PRACTITIONER

## 2019-04-03 RX ORDER — GABAPENTIN 100 MG/1
100 CAPSULE ORAL NIGHTLY
Qty: 30 CAPSULE | Refills: 0
Start: 2019-04-03 | End: 2019-04-03 | Stop reason: SDUPTHER

## 2019-04-03 RX ORDER — FLUTICASONE PROPIONATE 50 MCG
1 SPRAY, SUSPENSION (ML) NASAL DAILY
Qty: 16 G | Refills: 2 | Status: SHIPPED | OUTPATIENT
Start: 2019-04-03 | End: 2019-06-07 | Stop reason: SDUPTHER

## 2019-04-03 RX ORDER — GABAPENTIN 100 MG/1
100 CAPSULE ORAL NIGHTLY
Qty: 30 CAPSULE | Refills: 1 | Status: SHIPPED | OUTPATIENT
Start: 2019-04-03 | End: 2019-06-07 | Stop reason: SDUPTHER

## 2019-04-03 RX ORDER — DOCUSATE SODIUM 100 MG/1
100 CAPSULE, LIQUID FILLED ORAL 2 TIMES DAILY PRN
Qty: 60 CAPSULE | Refills: 0 | Status: SHIPPED | OUTPATIENT
Start: 2019-04-03 | End: 2022-04-25

## 2019-04-03 RX ORDER — TRIAMTERENE AND HYDROCHLOROTHIAZIDE 37.5; 25 MG/1; MG/1
CAPSULE ORAL
Qty: 90 CAPSULE | Refills: 3
Start: 2019-04-03 | End: 2019-06-06 | Stop reason: SDUPTHER

## 2019-04-23 DIAGNOSIS — E78.5 HYPERLIPIDEMIA: ICD-10-CM

## 2019-04-23 RX ORDER — LOVASTATIN 20 MG/1
20 TABLET ORAL DAILY
Qty: 90 TABLET | Refills: 3 | Status: SHIPPED | OUTPATIENT
Start: 2019-04-23 | End: 2020-11-11 | Stop reason: SDUPTHER

## 2019-04-29 DIAGNOSIS — I10 ESSENTIAL HYPERTENSION: ICD-10-CM

## 2019-04-30 RX ORDER — TRIAMTERENE AND HYDROCHLOROTHIAZIDE 37.5; 25 MG/1; MG/1
CAPSULE ORAL
Qty: 90 CAPSULE | Refills: 3 | Status: SHIPPED | OUTPATIENT
Start: 2019-04-30 | End: 2020-04-01

## 2019-05-06 ENCOUNTER — TELEPHONE (OUTPATIENT)
Dept: INTERNAL MEDICINE | Facility: CLINIC | Age: 84
End: 2019-05-06

## 2019-06-01 ENCOUNTER — LAB VISIT (OUTPATIENT)
Dept: LAB | Facility: HOSPITAL | Age: 84
End: 2019-06-01
Attending: INTERNAL MEDICINE
Payer: MEDICARE

## 2019-06-01 DIAGNOSIS — I10 ESSENTIAL HYPERTENSION: ICD-10-CM

## 2019-06-01 LAB
ALBUMIN SERPL BCP-MCNC: 3.6 G/DL (ref 3.5–5.2)
ALP SERPL-CCNC: 38 U/L (ref 55–135)
ALT SERPL W/O P-5'-P-CCNC: 5 U/L (ref 10–44)
ANION GAP SERPL CALC-SCNC: 11 MMOL/L (ref 8–16)
AST SERPL-CCNC: 15 U/L (ref 10–40)
BASOPHILS # BLD AUTO: 0.02 K/UL (ref 0–0.2)
BASOPHILS NFR BLD: 0.2 % (ref 0–1.9)
BILIRUB SERPL-MCNC: 0.4 MG/DL (ref 0.1–1)
BUN SERPL-MCNC: 21 MG/DL (ref 8–23)
CALCIUM SERPL-MCNC: 9.9 MG/DL (ref 8.7–10.5)
CHLORIDE SERPL-SCNC: 102 MMOL/L (ref 95–110)
CO2 SERPL-SCNC: 29 MMOL/L (ref 23–29)
CREAT SERPL-MCNC: 1.2 MG/DL (ref 0.5–1.4)
DIFFERENTIAL METHOD: ABNORMAL
EOSINOPHIL # BLD AUTO: 0.1 K/UL (ref 0–0.5)
EOSINOPHIL NFR BLD: 1 % (ref 0–8)
ERYTHROCYTE [DISTWIDTH] IN BLOOD BY AUTOMATED COUNT: 14.4 % (ref 11.5–14.5)
EST. GFR  (AFRICAN AMERICAN): >60 ML/MIN/1.73 M^2
EST. GFR  (NON AFRICAN AMERICAN): 54 ML/MIN/1.73 M^2
GLUCOSE SERPL-MCNC: 81 MG/DL (ref 70–110)
HCT VFR BLD AUTO: 38.9 % (ref 40–54)
HGB BLD-MCNC: 12.4 G/DL (ref 14–18)
LYMPHOCYTES # BLD AUTO: 3.1 K/UL (ref 1–4.8)
LYMPHOCYTES NFR BLD: 28.5 % (ref 18–48)
MCH RBC QN AUTO: 28.4 PG (ref 27–31)
MCHC RBC AUTO-ENTMCNC: 31.9 G/DL (ref 32–36)
MCV RBC AUTO: 89 FL (ref 82–98)
MONOCYTES # BLD AUTO: 1.1 K/UL (ref 0.3–1)
MONOCYTES NFR BLD: 9.8 % (ref 4–15)
NEUTROPHILS # BLD AUTO: 6.5 K/UL (ref 1.8–7.7)
NEUTROPHILS NFR BLD: 60.3 % (ref 38–73)
PLATELET # BLD AUTO: 246 K/UL (ref 150–350)
PMV BLD AUTO: 10.2 FL (ref 9.2–12.9)
POTASSIUM SERPL-SCNC: 3.3 MMOL/L (ref 3.5–5.1)
PROT SERPL-MCNC: 7.3 G/DL (ref 6–8.4)
RBC # BLD AUTO: 4.37 M/UL (ref 4.6–6.2)
SODIUM SERPL-SCNC: 142 MMOL/L (ref 136–145)
WBC # BLD AUTO: 10.74 K/UL (ref 3.9–12.7)

## 2019-06-01 PROCEDURE — 80053 COMPREHEN METABOLIC PANEL: CPT | Mod: HCNC

## 2019-06-01 PROCEDURE — 36415 COLL VENOUS BLD VENIPUNCTURE: CPT | Mod: HCNC

## 2019-06-01 PROCEDURE — 85025 COMPLETE CBC W/AUTO DIFF WBC: CPT | Mod: HCNC

## 2019-06-07 ENCOUNTER — OFFICE VISIT (OUTPATIENT)
Dept: INTERNAL MEDICINE | Facility: CLINIC | Age: 84
End: 2019-06-07
Payer: MEDICARE

## 2019-06-07 VITALS
DIASTOLIC BLOOD PRESSURE: 60 MMHG | WEIGHT: 143.75 LBS | BODY MASS INDEX: 21.29 KG/M2 | SYSTOLIC BLOOD PRESSURE: 120 MMHG | HEART RATE: 82 BPM | HEIGHT: 69 IN

## 2019-06-07 DIAGNOSIS — M46.92 INFLAMMATORY SPONDYLOPATHY OF CERVICAL REGION: ICD-10-CM

## 2019-06-07 DIAGNOSIS — M53.86 SCIATICA ASSOCIATED WITH DISORDER OF LUMBAR SPINE: Primary | ICD-10-CM

## 2019-06-07 DIAGNOSIS — I70.0 AORTIC ATHEROSCLEROSIS: ICD-10-CM

## 2019-06-07 PROBLEM — N17.9 AKI (ACUTE KIDNEY INJURY): Status: RESOLVED | Noted: 2019-03-27 | Resolved: 2019-06-07

## 2019-06-07 PROCEDURE — 99999 PR PBB SHADOW E&M-EST. PATIENT-LVL IV: CPT | Mod: PBBFAC,HCNC,, | Performed by: INTERNAL MEDICINE

## 2019-06-07 PROCEDURE — 1101F PT FALLS ASSESS-DOCD LE1/YR: CPT | Mod: HCNC,CPTII,S$GLB, | Performed by: INTERNAL MEDICINE

## 2019-06-07 PROCEDURE — 99213 OFFICE O/P EST LOW 20 MIN: CPT | Mod: HCNC,S$GLB,, | Performed by: INTERNAL MEDICINE

## 2019-06-07 PROCEDURE — 1101F PR PT FALLS ASSESS DOC 0-1 FALLS W/OUT INJ PAST YR: ICD-10-PCS | Mod: HCNC,CPTII,S$GLB, | Performed by: INTERNAL MEDICINE

## 2019-06-07 PROCEDURE — 99999 PR PBB SHADOW E&M-EST. PATIENT-LVL IV: ICD-10-PCS | Mod: PBBFAC,HCNC,, | Performed by: INTERNAL MEDICINE

## 2019-06-07 PROCEDURE — 99499 RISK ADDL DX/OHS AUDIT: ICD-10-PCS | Mod: S$GLB,,, | Performed by: INTERNAL MEDICINE

## 2019-06-07 PROCEDURE — 99499 UNLISTED E&M SERVICE: CPT | Mod: S$GLB,,, | Performed by: INTERNAL MEDICINE

## 2019-06-07 PROCEDURE — 99213 PR OFFICE/OUTPT VISIT, EST, LEVL III, 20-29 MIN: ICD-10-PCS | Mod: HCNC,S$GLB,, | Performed by: INTERNAL MEDICINE

## 2019-06-07 RX ORDER — MELOXICAM 15 MG/1
7.5 TABLET ORAL DAILY
Qty: 30 TABLET | Refills: 5
Start: 2019-06-07 | End: 2019-06-07

## 2019-06-07 RX ORDER — MELOXICAM 7.5 MG/1
7.5 TABLET ORAL DAILY
Qty: 90 TABLET | Refills: 3 | Status: SHIPPED | OUTPATIENT
Start: 2019-06-07 | End: 2020-06-04

## 2019-06-07 RX ORDER — FLUTICASONE PROPIONATE 50 MCG
1 SPRAY, SUSPENSION (ML) NASAL DAILY
Qty: 16 G | Refills: 2 | Status: SHIPPED | OUTPATIENT
Start: 2019-06-07 | End: 2021-02-12

## 2019-06-07 RX ORDER — GABAPENTIN 100 MG/1
100 CAPSULE ORAL DAILY
Refills: 3 | COMMUNITY
Start: 2019-05-23 | End: 2019-06-07 | Stop reason: SDUPTHER

## 2019-06-07 RX ORDER — GABAPENTIN 100 MG/1
100 CAPSULE ORAL DAILY
Qty: 90 CAPSULE | Refills: 3 | Status: SHIPPED | OUTPATIENT
Start: 2019-06-07 | End: 2022-04-25 | Stop reason: SDUPTHER

## 2019-06-07 NOTE — PROGRESS NOTES
Subjective:       Patient ID: Kevyn Leach is a 87 y.o. male.    Chief Complaint: Hyperlipidemia; Hypertension; Nasal Congestion (clear mucus); Shoulder Pain; Low-back Pain; Food Intolerance (feels like it won't go down); and Weight Loss    Leg Pain    The incident occurred more than 1 week ago. There was no injury mechanism. The pain is present in the left leg and right leg. The quality of the pain is described as aching. The pain is mild. The pain has been fluctuating since onset. Pertinent negatives include no inability to bear weight, numbness or tingling. The symptoms are aggravated by weight bearing. He has tried acetaminophen for the symptoms. The treatment provided mild relief.     Review of Systems   Constitutional: Negative for activity change and unexpected weight change.   HENT: Positive for rhinorrhea (allergic by description) and trouble swallowing (chr, no change). Negative for hearing loss.    Eyes: Positive for visual disturbance. Negative for discharge.   Respiratory: Negative for chest tightness and wheezing.    Cardiovascular: Negative for chest pain and palpitations.   Gastrointestinal: Negative for blood in stool, constipation, diarrhea and vomiting.   Endocrine: Negative for polydipsia and polyuria.   Genitourinary: Negative for difficulty urinating, hematuria and urgency.   Musculoskeletal: Positive for arthralgias and neck pain. Negative for joint swelling.   Neurological: Positive for weakness. Negative for tingling, numbness and headaches.   Psychiatric/Behavioral: Positive for confusion (per daughter-  known dementia, on Aricept). Negative for dysphoric mood.       Objective:      Physical Exam   Constitutional: He is oriented to person, place, and time. He appears well-developed and well-nourished. No distress.   HENT:   Head: Normocephalic and atraumatic.   Mouth/Throat: Oropharynx is clear and moist.   Eyes: Pupils are equal, round, and reactive to light. Conjunctivae are normal.    Neck: Normal range of motion. Neck supple.   Cardiovascular: Normal rate, regular rhythm and normal heart sounds.   Pulmonary/Chest: Effort normal and breath sounds normal. He has no wheezes.   Abdominal: Soft. Bowel sounds are normal. There is no tenderness.   Musculoskeletal: He exhibits no edema or tenderness.        Lumbar back: He exhibits decreased range of motion and spasm. He exhibits no tenderness, no bony tenderness and no deformity.   Neurological: He is alert and oriented to person, place, and time. No cranial nerve deficit.   Skin: No erythema.   Psychiatric: He has a normal mood and affect.   Vitals reviewed.      Assessment:       1. Sciatica associated with disorder of lumbar spine    2. Inflammatory spondylopathy of cervical region    3. Aortic atherosclerosis        Plan:       Kevyn was seen today for hyperlipidemia, hypertension, nasal congestion, shoulder pain, low-back pain, food intolerance and weight loss.    Diagnoses and all orders for this visit:    Sciatica associated with disorder of lumbar spine  -     Ambulatory consult to Physical Therapy  -     meloxicam (MOBIC) 7.5 MG tablet; Take 1 tablet (7.5 mg total) by mouth once daily.  -     gabapentin (NEURONTIN) 100 MG capsule; Take 1 capsule (100 mg total) by mouth once daily.    Inflammatory spondylopathy of cervical region  Comments:  not bothersome recently  Orders:  -     meloxicam (MOBIC) 7.5 MG tablet; Take 1 tablet (7.5 mg total) by mouth once daily.    Aortic atherosclerosis  Comments:  med regimen reviewed    Other orders  -     Cancel: Spirometry with/without bronchodilator; Future  -     fluticasone propionate (FLONASE) 50 mcg/actuation nasal spray; 1 spray (50 mcg total) by Each Nare route once daily.  -     Discontinue: meloxicam (MOBIC) 15 MG tablet; Take 0.5 tablets (7.5 mg total) by mouth once daily.        Follow up in about 6 months (around 12/7/2019), or if symptoms worsen or fail to improve.

## 2019-06-25 ENCOUNTER — DOCUMENTATION ONLY (OUTPATIENT)
Dept: REHABILITATION | Facility: OTHER | Age: 84
End: 2019-06-25

## 2019-06-25 DIAGNOSIS — R41.89 COGNITIVE IMPAIRMENT: ICD-10-CM

## 2019-06-26 RX ORDER — DONEPEZIL HYDROCHLORIDE 10 MG/1
TABLET, FILM COATED ORAL
Qty: 30 TABLET | Refills: 11 | Status: SHIPPED | OUTPATIENT
Start: 2019-06-26 | End: 2022-04-25 | Stop reason: SDUPTHER

## 2019-07-31 DIAGNOSIS — I10 ESSENTIAL HYPERTENSION: ICD-10-CM

## 2019-07-31 DIAGNOSIS — N40.0 BENIGN NON-NODULAR PROSTATIC HYPERPLASIA WITHOUT LOWER URINARY TRACT SYMPTOMS: ICD-10-CM

## 2019-07-31 DIAGNOSIS — N40.0 BENIGN PROSTATIC HYPERPLASIA: ICD-10-CM

## 2019-07-31 RX ORDER — VERAPAMIL HYDROCHLORIDE 240 MG/1
TABLET, FILM COATED, EXTENDED RELEASE ORAL
Qty: 90 TABLET | Refills: 12 | Status: SHIPPED | OUTPATIENT
Start: 2019-07-31 | End: 2022-04-25 | Stop reason: SDUPTHER

## 2019-07-31 RX ORDER — TAMSULOSIN HYDROCHLORIDE 0.4 MG/1
CAPSULE ORAL
Qty: 90 CAPSULE | Refills: 12 | Status: SHIPPED | OUTPATIENT
Start: 2019-07-31 | End: 2020-11-11 | Stop reason: SDUPTHER

## 2019-11-04 RX ORDER — FINASTERIDE 5 MG/1
TABLET, FILM COATED ORAL
Qty: 90 TABLET | Refills: 3 | Status: SHIPPED | OUTPATIENT
Start: 2019-11-04 | End: 2021-02-10

## 2019-11-19 ENCOUNTER — PATIENT OUTREACH (OUTPATIENT)
Dept: ADMINISTRATIVE | Facility: OTHER | Age: 84
End: 2019-11-19

## 2019-12-11 ENCOUNTER — PATIENT OUTREACH (OUTPATIENT)
Dept: ADMINISTRATIVE | Facility: OTHER | Age: 84
End: 2019-12-11

## 2019-12-23 ENCOUNTER — TELEPHONE (OUTPATIENT)
Dept: SURGERY | Facility: CLINIC | Age: 84
End: 2019-12-23

## 2019-12-23 NOTE — TELEPHONE ENCOUNTER
Spoke with patient's wife regarding patient's office visit and ultrasound testing appointment. Patient's wife aware of both office visit date, time, and location, as well as ultrasound testing date, time and location. All questions answered; patient's wife verbalized understanding.

## 2020-01-06 ENCOUNTER — PATIENT OUTREACH (OUTPATIENT)
Dept: ADMINISTRATIVE | Facility: OTHER | Age: 85
End: 2020-01-06

## 2020-01-10 ENCOUNTER — OFFICE VISIT (OUTPATIENT)
Dept: SURGERY | Facility: CLINIC | Age: 85
End: 2020-01-10
Payer: MEDICARE

## 2020-01-10 VITALS
SYSTOLIC BLOOD PRESSURE: 115 MMHG | BODY MASS INDEX: 24.03 KG/M2 | HEART RATE: 87 BPM | TEMPERATURE: 98 F | HEIGHT: 64 IN | OXYGEN SATURATION: 98 % | DIASTOLIC BLOOD PRESSURE: 68 MMHG | RESPIRATION RATE: 16 BRPM

## 2020-01-10 DIAGNOSIS — K40.90 LEFT INGUINAL HERNIA: Primary | ICD-10-CM

## 2020-01-10 PROCEDURE — 1159F MED LIST DOCD IN RCRD: CPT | Mod: HCNC,S$GLB,, | Performed by: SURGERY

## 2020-01-10 PROCEDURE — 1101F PT FALLS ASSESS-DOCD LE1/YR: CPT | Mod: HCNC,CPTII,S$GLB, | Performed by: SURGERY

## 2020-01-10 PROCEDURE — 99204 OFFICE O/P NEW MOD 45 MIN: CPT | Mod: HCNC,S$GLB,, | Performed by: SURGERY

## 2020-01-10 PROCEDURE — 1159F PR MEDICATION LIST DOCUMENTED IN MEDICAL RECORD: ICD-10-PCS | Mod: HCNC,S$GLB,, | Performed by: SURGERY

## 2020-01-10 PROCEDURE — 1126F AMNT PAIN NOTED NONE PRSNT: CPT | Mod: HCNC,S$GLB,, | Performed by: SURGERY

## 2020-01-10 PROCEDURE — 1101F PR PT FALLS ASSESS DOC 0-1 FALLS W/OUT INJ PAST YR: ICD-10-PCS | Mod: HCNC,CPTII,S$GLB, | Performed by: SURGERY

## 2020-01-10 PROCEDURE — 1126F PR PAIN SEVERITY QUANTIFIED, NO PAIN PRESENT: ICD-10-PCS | Mod: HCNC,S$GLB,, | Performed by: SURGERY

## 2020-01-10 PROCEDURE — 99999 PR PBB SHADOW E&M-EST. PATIENT-LVL III: CPT | Mod: PBBFAC,HCNC,, | Performed by: SURGERY

## 2020-01-10 PROCEDURE — 99204 PR OFFICE/OUTPT VISIT, NEW, LEVL IV, 45-59 MIN: ICD-10-PCS | Mod: HCNC,S$GLB,, | Performed by: SURGERY

## 2020-01-10 PROCEDURE — 99999 PR PBB SHADOW E&M-EST. PATIENT-LVL III: ICD-10-PCS | Mod: PBBFAC,HCNC,, | Performed by: SURGERY

## 2020-01-10 RX ORDER — ACETAMINOPHEN AND CODEINE PHOSPHATE 300; 30 MG/1; MG/1
TABLET ORAL
COMMUNITY
End: 2020-11-11 | Stop reason: ALTCHOICE

## 2020-01-10 NOTE — LETTER
January 10, 2020      Mookie Roberts, FNP-C  [address] [facility] [address]           [facility]  [address], [facility] [address]  [facility] [address]  Phone: [phone]  Fax: [phone]          Patient: Kevyn Leach   MR Number: [id number]   YOB: 1931   Date of Visit: 1/10/2020       Dear Mookie Roberts:    Thank you for referring Kevyn Leach to me for evaluation. Attached you will find relevant portions of my assessment and plan of care.    If you have questions, please do not hesitate to call me. I look forward to following Kevyn Leach along with you.    Sincerely,    Jackson Denton MD    Enclosure  CC:  No Recipients    If you would like to receive this communication electronically, please contact [email or web address] or [phone] to request more information on Currently Link access.    For providers and/or their staff who would like to refer a patient to [facility], please contact us through our one-stop-shop provider referral line, [facility], at [phone].    If you feel you have received this communication in error or would no longer like to receive these types of communications, please e-mail [email or web address]

## 2020-01-10 NOTE — PROGRESS NOTES
History & Physical    SUBJECTIVE:     History of Present Illness:  Patient is a 88 y.o. male presents with a concern for left inguinal hernia.  He has noticed a bulge in his left groin, was sent over from his primary care physician.  Ultrasound of the pelvis did not show a hernia, but it is obvious on physical exam today should.  He has a reducible left inguinal hernia, likely direct.  There is no pain or tenderness, and the patient states he does not have any symptoms from this.  After discussion with him and his daughter, we will just monitor this was observation for now and if he develops any worsening symptoms or emergent need for hernia repair they will call back.      Chief Complaint   Patient presents with    Consult       Review of patient's allergies indicates:  No Known Allergies    Current Outpatient Medications   Medication Sig Dispense Refill    docusate sodium (COLACE) 100 MG capsule Take 1 capsule (100 mg total) by mouth 2 (two) times daily as needed for Constipation. 60 capsule 0    donepezil (ARICEPT) 10 MG tablet TAKE 1 TABLET BY MOUTH IN THE EVENING 30 tablet 11    finasteride (PROSCAR) 5 mg tablet TAKE 1 TABLET BY MOUTH ONCE DAILY 90 tablet 3    fluticasone propionate (FLONASE) 50 mcg/actuation nasal spray 1 spray (50 mcg total) by Each Nare route once daily. 16 g 2    gabapentin (NEURONTIN) 100 MG capsule Take 1 capsule (100 mg total) by mouth once daily. 90 capsule 3    lidocaine (LIDODERM) 5 % Place 1 patch onto the skin once daily. Remove & Discard patch within 12 hours or as directed by MD 30 patch 0    lovastatin (MEVACOR) 20 MG tablet Take 1 tablet (20 mg total) by mouth once daily. 90 tablet 3    meloxicam (MOBIC) 7.5 MG tablet Take 1 tablet (7.5 mg total) by mouth once daily. 90 tablet 3    polyethylene glycol (GLYCOLAX) 17 gram/dose powder Take 17 g by mouth once daily. 595 g 0    tamsulosin (FLOMAX) 0.4 mg Cap TAKE 1 CAPSULE BY MOUTH ONCE DAILY 90 capsule 12     triamterene-hydrochlorothiazide 37.5-25 mg (DYAZIDE) 37.5-25 mg per capsule TAKE 1 CAPSULE BY MOUTH ONCE DAILY 90 capsule 3    verapamil (CALAN-SR) 240 MG CR tablet TAKE 1 TABLET BY MOUTH ONCE DAILY 90 tablet 12    acetaminophen-codeine 300-30mg (TYLENOL #3) 300-30 mg Tab Take 1 tablet every 6 hours by oral route.      aspirin 81 MG Chew Take 1 tablet (81 mg total) by mouth once daily. (Patient not taking: Reported on 1/10/2020)  0    diclofenac sodium (VOLTAREN) 1 % Gel Apply 2 g topically once daily. (Patient not taking: Reported on 1/10/2020) 100 g 0     No current facility-administered medications for this visit.        Past Medical History:   Diagnosis Date    Amblyopia     refractive amblyopia left eye ()    Arthritis     BPH (benign prostatic hypertrophy)     Colon polyp     Generalized pain     Hyperlipidemia     Hypertension     URI (upper respiratory infection)      Past Surgical History:   Procedure Laterality Date    CATARACT EXTRACTION W/  INTRAOCULAR LENS IMPLANT Right n/a    done at [facility] before     CATARACT EXTRACTION W/  INTRAOCULAR LENS IMPLANT Left 2016    OS     EYE SURGERY      PROSTATE SURGERY      TRANSURETHRAL RESECTION OF PROSTATE       Family History   Problem Relation Age of Onset    Diabetes Father     Diabetes Paternal Aunt     Cancer Brother     No Known Problems Mother     Heart disease Neg Hx     Amblyopia Neg Hx     Blindness Neg Hx     Cataracts Neg Hx     Glaucoma Neg Hx     Hypertension Neg Hx     Macular degeneration Neg Hx     Retinal detachment Neg Hx     Strabismus Neg Hx     Stroke Neg Hx     Thyroid disease Neg Hx      Social History     Tobacco Use    Smoking status: Former Smoker     Last attempt to quit: 1987     Years since quittin.3    Smokeless tobacco: Never Used   Substance Use Topics    Alcohol use: No     Frequency: Never     Drinks per session: Patient refused     Binge frequency: Never  "   Drug use: No        Review of Systems:  Review of Systems   Constitutional: Negative for appetite change, fatigue, fever and unexpected weight change.   HENT: Negative for sore throat and trouble swallowing.    Eyes: Negative.    Respiratory: Negative for cough, shortness of breath and wheezing.    Cardiovascular: Negative for chest pain and leg swelling.   Gastrointestinal: Negative for abdominal distention, abdominal pain, blood in stool, constipation, diarrhea, nausea and vomiting.   Endocrine: Negative.    Genitourinary: Negative.    Musculoskeletal: Negative for back pain.   Skin: Negative.  Negative for rash.   Allergic/Immunologic: Negative.    Neurological: Negative.    Hematological: Negative.    Psychiatric/Behavioral: Negative for confusion.       OBJECTIVE:     Vital Signs (Most Recent)  Temp: 98 °F (36.7 °C) (01/10/20 1013)  Pulse: 87 (01/10/20 1013)  Resp: 16 (01/10/20 1013)  BP: 115/68 (01/10/20 1013)  SpO2: 98 % (01/10/20 1013)  5' 4" (1.626 m)        Physical Exam:  Physical Exam   Constitutional: He is oriented to person, place, and time. He appears well-developed and well-nourished.   HENT:   Head: Normocephalic and atraumatic.   Eyes: EOM are normal.   Neck: Normal range of motion.   Cardiovascular: Normal rate and normal heart sounds.   Pulmonary/Chest: Effort normal.   Abdominal: Soft. Bowel sounds are normal. He exhibits no distension. There is no tenderness. A hernia (Left groin, reducible, 2 cm defect) is present.   Musculoskeletal: Normal range of motion.   Neurological: He is alert and oriented to person, place, and time.   Skin: Skin is warm and dry. Capillary refill takes less than 2 seconds.   Psychiatric: He has a normal mood and affect. His behavior is normal.   Nursing note and vitals reviewed.      Laboratory  CBC: Reviewed  BMP: Reviewed    Diagnostic Results:  US: Reviewed  No hernia noted, enlarged prostate    ASSESSMENT/PLAN:     88-year-old male with a left inguinal hernia, " reducible, mostly asymptomatic    PLAN:Plan     Return to clinic if symptoms worsen or if he is unable to reduce the hernia.  Otherwise we will just monitor for now

## 2020-03-31 DIAGNOSIS — I10 ESSENTIAL HYPERTENSION: ICD-10-CM

## 2020-04-01 RX ORDER — TRIAMTERENE AND HYDROCHLOROTHIAZIDE 37.5; 25 MG/1; MG/1
CAPSULE ORAL
Qty: 90 CAPSULE | Refills: 1 | Status: SHIPPED | OUTPATIENT
Start: 2020-04-01 | End: 2020-11-30

## 2020-06-03 DIAGNOSIS — M46.92 INFLAMMATORY SPONDYLOPATHY OF CERVICAL REGION: ICD-10-CM

## 2020-06-03 DIAGNOSIS — M53.86 SCIATICA ASSOCIATED WITH DISORDER OF LUMBAR SPINE: ICD-10-CM

## 2020-06-04 RX ORDER — MELOXICAM 7.5 MG/1
TABLET ORAL
Qty: 90 TABLET | Refills: 0 | Status: SHIPPED | OUTPATIENT
Start: 2020-06-04 | End: 2020-12-02

## 2020-06-04 NOTE — PROGRESS NOTES
Refill Routing Note    Medication(s) are not appropriate for processing by [facility]:       Outside of protocol           Medication reconciliation completed: No      Automatic Epic Protocol Generated Data:    Requested Prescriptions   Pending Prescriptions Disp Refills    meloxicam (MOBIC) 7.5 MG tablet [Pharmacy Med Name: Meloxicam 7.5 MG Oral Tablet] 90 tablet 0     Sig: Take 1 tablet by mouth once daily       NSAIDs Protocol Passed - 6/3/2020  6:03 PM        Passed - Serum Creatinine less than 1.4 on file in the past 12 months     Lab Results   Component Value Date    CREATININE 1.2 07/09/2019    CREATININE 1.2 06/01/2019    CREATININE 1.2 05/22/2019     Lab Results   Component Value Date    ESTGFRAFRICA >60.0 07/09/2019    ESTGFRAFRICA >60 06/01/2019    ESTGFRAFRICA >60.0 05/22/2019               Passed - Visit with authorizing provider in past 12 months or upcoming 90 days        Passed - HGB greater than 10 or HCT greater than 30 in past 12 months        Passed - AST in past 12 months      Lab Results   Component Value Date    AST 21 07/09/2019    AST 15 06/01/2019    AST 25 05/22/2019              Passed - Serum Potassium less than 5.2 on file in the past 12 months     Lab Results   Component Value Date    K 3.8 07/09/2019    K 3.3 (L) 06/01/2019    K 3.5 05/22/2019                  Passed - Blood Pressure below 139/89 on file in past 12 months      BP Readings from Last 3 Encounters:   01/10/20 115/68   07/09/19 (!) 146/67   06/07/19 120/60             Passed - ALT less than 95 in past 12 months     Lab Results   Component Value Date    ALT 11 (L) 07/09/2019    ALT 5 (L) 06/01/2019    ALT 11 (L) 05/22/2019                 Appointments  past 12m or future 3m with PCP    Date Provider   Last Visit   6/7/2019 Francisco Lindquist MD   Next Visit   Visit date not found Francisco Lindquist MD   ED visits in past 90 days: 0     Note composed:9:04 PM 06/03/2020

## 2020-09-28 ENCOUNTER — PES CALL (OUTPATIENT)
Dept: ADMINISTRATIVE | Facility: CLINIC | Age: 85
End: 2020-09-28

## 2020-09-29 ENCOUNTER — PATIENT MESSAGE (OUTPATIENT)
Dept: OTHER | Facility: OTHER | Age: 85
End: 2020-09-29

## 2020-10-05 ENCOUNTER — PATIENT MESSAGE (OUTPATIENT)
Dept: ADMINISTRATIVE | Facility: HOSPITAL | Age: 85
End: 2020-10-05

## 2020-11-06 ENCOUNTER — TELEPHONE (OUTPATIENT)
Dept: INTERNAL MEDICINE | Facility: CLINIC | Age: 85
End: 2020-11-06

## 2020-11-06 DIAGNOSIS — E55.9 VITAMIN D INSUFFICIENCY: Primary | ICD-10-CM

## 2020-11-06 DIAGNOSIS — E78.2 MIXED HYPERLIPIDEMIA: ICD-10-CM

## 2020-11-06 DIAGNOSIS — I10 ESSENTIAL HYPERTENSION: Chronic | ICD-10-CM

## 2020-11-06 NOTE — TELEPHONE ENCOUNTER
----- Message from Evelyne Osman sent at 11/6/2020  1:25 PM CST -----  Regarding: my chart request  Appointment Request From: Kevyn Leach    With Provider: Francisco Lindquist MD [[facility]]    Preferred Date Range: Any    Preferred Times: Any Time    Reason for visit: Annual Check-up    Comments:  Annual follow up

## 2020-11-11 ENCOUNTER — OFFICE VISIT (OUTPATIENT)
Dept: INTERNAL MEDICINE | Facility: CLINIC | Age: 85
End: 2020-11-11
Payer: MEDICARE

## 2020-11-11 ENCOUNTER — IMMUNIZATION (OUTPATIENT)
Dept: INTERNAL MEDICINE | Facility: CLINIC | Age: 85
End: 2020-11-11
Payer: MEDICARE

## 2020-11-11 VITALS
WEIGHT: 128.31 LBS | HEART RATE: 68 BPM | SYSTOLIC BLOOD PRESSURE: 120 MMHG | BODY MASS INDEX: 19 KG/M2 | DIASTOLIC BLOOD PRESSURE: 72 MMHG | HEIGHT: 69 IN

## 2020-11-11 DIAGNOSIS — N40.0 BENIGN NON-NODULAR PROSTATIC HYPERPLASIA WITHOUT LOWER URINARY TRACT SYMPTOMS: ICD-10-CM

## 2020-11-11 DIAGNOSIS — I10 ESSENTIAL HYPERTENSION: Chronic | ICD-10-CM

## 2020-11-11 DIAGNOSIS — G31.84 MCI (MILD COGNITIVE IMPAIRMENT): ICD-10-CM

## 2020-11-11 DIAGNOSIS — Z00.00 WELLNESS EXAMINATION: Primary | ICD-10-CM

## 2020-11-11 DIAGNOSIS — E78.5 HYPERLIPIDEMIA, UNSPECIFIED HYPERLIPIDEMIA TYPE: ICD-10-CM

## 2020-11-11 PROBLEM — M48.061 LUMBAR STENOSIS: Chronic | Status: ACTIVE | Noted: 2017-07-31

## 2020-11-11 PROBLEM — M47.812 CERVICAL SPINE ARTHRITIS: Chronic | Status: ACTIVE | Noted: 2017-07-31

## 2020-11-11 PROBLEM — G60.9 IDIOPATHIC PERIPHERAL NEUROPATHY: Chronic | Status: ACTIVE | Noted: 2020-11-11

## 2020-11-11 PROBLEM — G93.40 ENCEPHALOPATHY ACUTE: Status: RESOLVED | Noted: 2019-03-27 | Resolved: 2020-11-11

## 2020-11-11 PROBLEM — G60.9 IDIOPATHIC PERIPHERAL NEUROPATHY: Status: ACTIVE | Noted: 2020-11-11

## 2020-11-11 PROBLEM — I70.0 AORTIC ATHEROSCLEROSIS: Chronic | Status: ACTIVE | Noted: 2017-07-31

## 2020-11-11 PROCEDURE — 90694 FLU VACCINE - QUADRIVALENT - ADJUVANTED: ICD-10-PCS | Mod: HCNC,S$GLB,, | Performed by: INTERNAL MEDICINE

## 2020-11-11 PROCEDURE — G0008 ADMIN INFLUENZA VIRUS VAC: HCPCS | Mod: HCNC,S$GLB,, | Performed by: INTERNAL MEDICINE

## 2020-11-11 PROCEDURE — 99999 PR PBB SHADOW E&M-EST. PATIENT-LVL III: ICD-10-PCS | Mod: PBBFAC,HCNC,, | Performed by: INTERNAL MEDICINE

## 2020-11-11 PROCEDURE — 99397 PER PM REEVAL EST PAT 65+ YR: CPT | Mod: HCNC,S$GLB,, | Performed by: INTERNAL MEDICINE

## 2020-11-11 PROCEDURE — 99999 PR PBB SHADOW E&M-EST. PATIENT-LVL III: CPT | Mod: PBBFAC,HCNC,, | Performed by: INTERNAL MEDICINE

## 2020-11-11 PROCEDURE — 90694 VACC AIIV4 NO PRSRV 0.5ML IM: CPT | Mod: HCNC,S$GLB,, | Performed by: INTERNAL MEDICINE

## 2020-11-11 PROCEDURE — 99499 UNLISTED E&M SERVICE: CPT | Mod: S$GLB,,, | Performed by: INTERNAL MEDICINE

## 2020-11-11 PROCEDURE — 99499 RISK ADDL DX/OHS AUDIT: ICD-10-PCS | Mod: S$GLB,,, | Performed by: INTERNAL MEDICINE

## 2020-11-11 PROCEDURE — 99397 PR PREVENTIVE VISIT,EST,65 & OVER: ICD-10-PCS | Mod: HCNC,S$GLB,, | Performed by: INTERNAL MEDICINE

## 2020-11-11 PROCEDURE — G0008 FLU VACCINE - QUADRIVALENT - ADJUVANTED: ICD-10-PCS | Mod: HCNC,S$GLB,, | Performed by: INTERNAL MEDICINE

## 2020-11-11 RX ORDER — VERAPAMIL HYDROCHLORIDE 240 MG/1
TABLET, FILM COATED, EXTENDED RELEASE ORAL
COMMUNITY
Start: 2019-11-15 | End: 2020-11-11 | Stop reason: SDUPTHER

## 2020-11-11 RX ORDER — TAMSULOSIN HYDROCHLORIDE 0.4 MG/1
CAPSULE ORAL
COMMUNITY
Start: 2019-11-15 | End: 2020-11-11 | Stop reason: SDUPTHER

## 2020-11-11 RX ORDER — LOVASTATIN 20 MG/1
TABLET ORAL
COMMUNITY
End: 2020-11-11 | Stop reason: SDUPTHER

## 2020-11-11 RX ORDER — FLUTICASONE PROPIONATE 50 MCG
SPRAY, SUSPENSION (ML) NASAL
COMMUNITY
End: 2020-11-11 | Stop reason: SDUPTHER

## 2020-11-11 RX ORDER — LOVASTATIN 20 MG/1
20 TABLET ORAL DAILY
Qty: 90 TABLET | Refills: 3 | Status: SHIPPED | OUTPATIENT
Start: 2020-11-11 | End: 2022-04-25 | Stop reason: SDUPTHER

## 2020-11-11 RX ORDER — GABAPENTIN 100 MG/1
300 CAPSULE ORAL DAILY
COMMUNITY
End: 2020-11-11 | Stop reason: SDUPTHER

## 2020-11-11 RX ORDER — TAMSULOSIN HYDROCHLORIDE 0.4 MG/1
1 CAPSULE ORAL DAILY
Qty: 90 CAPSULE | Refills: 3 | Status: SHIPPED | OUTPATIENT
Start: 2020-11-11 | End: 2022-04-25 | Stop reason: SDUPTHER

## 2020-11-11 RX ORDER — POLYETHYLENE GLYCOL 3350 17 G/17G
POWDER, FOR SOLUTION ORAL
COMMUNITY
Start: 2019-12-23 | End: 2020-11-11 | Stop reason: SDUPTHER

## 2020-11-11 RX ORDER — TRIAMTERENE AND HYDROCHLOROTHIAZIDE 37.5; 25 MG/1; MG/1
CAPSULE ORAL
COMMUNITY
End: 2020-11-11 | Stop reason: SDUPTHER

## 2020-11-11 RX ORDER — FINASTERIDE 5 MG/1
TABLET, FILM COATED ORAL
COMMUNITY
Start: 2019-11-15 | End: 2020-11-11 | Stop reason: SDUPTHER

## 2020-11-11 NOTE — PROGRESS NOTES
Kevyn Leach is a 89 y.o. Black or  male who presents for an wellness and health visit with no  other complaints today. He is also here for yearly follow up of his chronic issues as listed below.   All chronic problems as listed in the active problem list have been stable and well controlled recently. The active problem list was reviewed and reconciled today and is up to date as of today.  Patient Active Problem List   Diagnosis    Benign prostatic hyperplasia    History of colon polyps    GERD with esophagitis    Hyperlipidemia    Intraoperative floppy iris syndrome (IFIS)    Essential hypertension    Aortic atherosclerosis    Lumbar stenosis    Cervical spine arthritis    Idiopathic peripheral neuropathy    MCI (mild cognitive impairment)     Other issues addressed today:None. He is in his normal state of health and has no particular complaints today.    He denies any recent ER visits or hospitalizations.     He states that he is compliant with all prescribed medications and he has experienced no side effects. I have reviewed all current medications and updated the current medication list during this encounter.     PMFSH: all information reviewed and updated today.   ll labs and tests from earlier today reviewed. Abnormalities (if any) are addressed in the assessment and plan.     Review of Systems   Constitutional: Positive for fatigue (chronic in nature. No changes according to the patient.). Negative for chills and fever.   HENT: Negative.    Eyes: Negative.    Respiratory: Negative for cough, chest tightness, shortness of breath and wheezing.    Cardiovascular: Negative for chest pain, palpitations and leg swelling.   Gastrointestinal: Negative for abdominal pain, blood in stool, constipation and diarrhea.   Endocrine: Negative.    Genitourinary: Negative.    Musculoskeletal: Positive for arthralgias (occasionally but no changes in these chronic complaints. ).   Neurological:  "Negative for dizziness, tremors, syncope and headaches.   Psychiatric/Behavioral: Negative for agitation and dysphoric mood.     Vitals:    11/11/20 0954   BP: 120/72   BP Location: Left arm   Patient Position: Sitting   BP Method: Medium (Manual)   Pulse: 68   Weight: 58.2 kg (128 lb 4.9 oz)   Height: 5' 9" (1.753 m)    Body mass index is 18.95 kg/m².    Physical Exam  Constitutional:       General: He is not in acute distress.     Appearance: Normal appearance. He is well-developed. He is not ill-appearing.   Neck:      Thyroid: No thyromegaly.      Vascular: No JVD.   Cardiovascular:      Rate and Rhythm: Normal rate and regular rhythm.      Heart sounds: Normal heart sounds. No murmur.   Pulmonary:      Effort: Pulmonary effort is normal.      Breath sounds: Normal breath sounds.   Abdominal:      General: Bowel sounds are normal.      Palpations: Abdomen is soft.   Skin:     General: Skin is warm and dry.   Neurological:      Mental Status: He is alert. Mental status is at baseline.   Psychiatric:         Behavior: Behavior normal.          Lab Results   Component Value Date    WBC 3.90 11/07/2020    HGB 14.2 11/07/2020    HCT 43.2 11/07/2020     11/07/2020    CHOL 157 11/07/2020    TRIG 97 11/07/2020    HDL 55 11/07/2020    ALT 12 (L) 11/07/2020    AST 18 11/07/2020     11/07/2020    K 3.6 11/07/2020     11/07/2020    CREATININE 1.0 11/07/2020    BUN 12 11/07/2020    CO2 30 (H) 11/07/2020    TSH 0.82 07/09/2019    PSA 4.5 (H) 05/15/2014    INR 1.0 08/13/2014    HGBA1C 5.5 03/27/2019      ASSESSMENT/PLAN:  1. Wellness examination    2. Benign non-nodular prostatic hyperplasia without lower urinary tract symptoms  Stable and controlled. No changes to current therapeutic plan. Continue all previously prescribed medications. See encounter for associated orders.    - tamsulosin (FLOMAX) 0.4 mg Cap; Take 1 capsule (0.4 mg total) by mouth once daily.  Dispense: 90 capsule; Refill: 3    3. " Hyperlipidemia  Stable and controlled. No changes to current therapeutic plan. Continue all previously prescribed medications. See encounter for associated orders.    - lovastatin (MEVACOR) 20 MG tablet; Take 1 tablet (20 mg total) by mouth once daily.  Dispense: 90 tablet; Refill: 3    4. Essential hypertension  Stable and controlled. No changes to current therapeutic plan. Continue all previously prescribed medications. See encounter for associated orders.  .    5. MCI (mild cognitive impairment)  Stable and controlled. No changes to current therapeutic plan. Continue all previously prescribed medications. See encounter for associated orders.

## 2020-11-30 DIAGNOSIS — M53.86 SCIATICA ASSOCIATED WITH DISORDER OF LUMBAR SPINE: ICD-10-CM

## 2020-11-30 DIAGNOSIS — M46.92 INFLAMMATORY SPONDYLOPATHY OF CERVICAL REGION: ICD-10-CM

## 2020-12-01 NOTE — PROGRESS NOTES
Refill Routing Note   Medication(s) are not appropriate for processing by [facility] for the following reason(s):     - Outside of protocol  [facility] action(s):  Route        Medication reconciliation completed: No   Automatic Epic Generated Protocol Data:        Requested Prescriptions   Pending Prescriptions Disp Refills    meloxicam (MOBIC) 7.5 MG tablet [Pharmacy Med Name: Meloxicam 7.5 MG Oral Tablet] 90 tablet 0     Sig: Take 1 tablet by mouth once daily       NSAIDs Protocol Passed - 11/30/2020  5:46 PM        Passed - Serum Creatinine less than 1.4 on file in the past 12 months     Lab Results   Component Value Date    CREATININE 1.0 11/07/2020    CREATININE 1.0 11/01/2020    CREATININE 1.0 07/20/2020     Lab Results   Component Value Date    ESTGFRAFRICA >60.0 11/07/2020    ESTGFRAFRICA >60.0 11/01/2020    ESTGFRAFRICA >60.0 07/20/2020               Passed - Visit with authorizing provider in past 12 months or upcoming 90 days        Passed - HGB greater than 10 or HCT greater than 30 in past 12 months        Passed - AST in past 12 months      Lab Results   Component Value Date    AST 18 11/07/2020    AST 22 11/01/2020    AST 26 07/20/2020              Passed - Serum Potassium less than 5.2 on file in the past 12 months     Lab Results   Component Value Date    K 3.6 11/07/2020    K 3.2 (L) 11/01/2020    K 3.2 (L) 07/20/2020                  Passed - Blood Pressure below 139/89 on file in past 12 months      BP Readings from Last 3 Encounters:   11/29/20 129/63   11/11/20 120/72   11/01/20 (!) 146/77             Passed - ALT less than 95 in past 12 months     Lab Results   Component Value Date    ALT 12 (L) 11/07/2020    ALT 10 (L) 11/01/2020    ALT 15 (L) 07/20/2020                    Appointments  past 12m or future 3m with PCP    Date Provider   Last Visit   6/7/2019 Francisco Lindquist MD   Next Visit   Visit date not found Francisco Lindquist MD   ED visits in past 90 days: 2        Note composed:8:22 PM  11/30/2020

## 2020-12-02 RX ORDER — MELOXICAM 7.5 MG/1
TABLET ORAL
Qty: 90 TABLET | Refills: 1 | Status: SHIPPED | OUTPATIENT
Start: 2020-12-02 | End: 2022-04-25

## 2020-12-11 ENCOUNTER — PATIENT MESSAGE (OUTPATIENT)
Dept: OTHER | Facility: OTHER | Age: 85
End: 2020-12-11

## 2021-01-06 ENCOUNTER — TELEPHONE (OUTPATIENT)
Dept: INTERNAL MEDICINE | Facility: CLINIC | Age: 86
End: 2021-01-06

## 2021-01-29 ENCOUNTER — PES CALL (OUTPATIENT)
Dept: ADMINISTRATIVE | Facility: CLINIC | Age: 86
End: 2021-01-29

## 2021-02-10 RX ORDER — FINASTERIDE 5 MG/1
TABLET, FILM COATED ORAL
Qty: 90 TABLET | Refills: 0 | Status: SHIPPED | OUTPATIENT
Start: 2021-02-10 | End: 2022-04-25 | Stop reason: SDUPTHER

## 2021-02-22 ENCOUNTER — PES CALL (OUTPATIENT)
Dept: ADMINISTRATIVE | Facility: CLINIC | Age: 86
End: 2021-02-22

## 2021-03-15 ENCOUNTER — PES CALL (OUTPATIENT)
Dept: ADMINISTRATIVE | Facility: CLINIC | Age: 86
End: 2021-03-15

## 2021-04-16 ENCOUNTER — TELEPHONE (OUTPATIENT)
Dept: INTERNAL MEDICINE | Facility: CLINIC | Age: 86
End: 2021-04-16

## 2021-04-16 DIAGNOSIS — I10 ESSENTIAL HYPERTENSION: ICD-10-CM

## 2021-04-16 DIAGNOSIS — E78.5 HYPERLIPIDEMIA, UNSPECIFIED HYPERLIPIDEMIA TYPE: Primary | ICD-10-CM

## 2021-05-21 ENCOUNTER — OFFICE VISIT (OUTPATIENT)
Dept: HOME HEALTH SERVICES | Facility: CLINIC | Age: 86
End: 2021-05-21
Payer: MEDICARE

## 2021-05-21 VITALS
SYSTOLIC BLOOD PRESSURE: 143 MMHG | TEMPERATURE: 98 F | DIASTOLIC BLOOD PRESSURE: 63 MMHG | WEIGHT: 150 LBS | HEIGHT: 69 IN | HEART RATE: 100 BPM | BODY MASS INDEX: 22.22 KG/M2

## 2021-05-21 DIAGNOSIS — Z99.89 DEPENDENCE ON OTHER ENABLING MACHINES AND DEVICES: ICD-10-CM

## 2021-05-21 DIAGNOSIS — Z74.09 OTHER REDUCED MOBILITY: ICD-10-CM

## 2021-05-21 DIAGNOSIS — I10 ESSENTIAL HYPERTENSION: Chronic | ICD-10-CM

## 2021-05-21 DIAGNOSIS — G31.84 MCI (MILD COGNITIVE IMPAIRMENT): Chronic | ICD-10-CM

## 2021-05-21 DIAGNOSIS — G60.9 IDIOPATHIC PERIPHERAL NEUROPATHY: Chronic | ICD-10-CM

## 2021-05-21 DIAGNOSIS — N40.0 BENIGN PROSTATIC HYPERPLASIA, UNSPECIFIED WHETHER LOWER URINARY TRACT SYMPTOMS PRESENT: Chronic | ICD-10-CM

## 2021-05-21 DIAGNOSIS — M48.061 SPINAL STENOSIS OF LUMBAR REGION, UNSPECIFIED WHETHER NEUROGENIC CLAUDICATION PRESENT: Chronic | ICD-10-CM

## 2021-05-21 DIAGNOSIS — K21.00 GASTROESOPHAGEAL REFLUX DISEASE WITH ESOPHAGITIS, UNSPECIFIED WHETHER HEMORRHAGE: Chronic | ICD-10-CM

## 2021-05-21 DIAGNOSIS — E78.5 HYPERLIPIDEMIA, UNSPECIFIED HYPERLIPIDEMIA TYPE: Chronic | ICD-10-CM

## 2021-05-21 DIAGNOSIS — Z00.00 ENCOUNTER FOR PREVENTIVE HEALTH EXAMINATION: Primary | ICD-10-CM

## 2021-05-21 DIAGNOSIS — I70.0 AORTIC ATHEROSCLEROSIS: Chronic | ICD-10-CM

## 2021-05-21 PROCEDURE — 3288F PR FALLS RISK ASSESSMENT DOCUMENTED: ICD-10-PCS | Mod: CPTII,S$GLB,, | Performed by: NURSE PRACTITIONER

## 2021-05-21 PROCEDURE — G0439 PR MEDICARE ANNUAL WELLNESS SUBSEQUENT VISIT: ICD-10-PCS | Mod: S$GLB,,, | Performed by: NURSE PRACTITIONER

## 2021-05-21 PROCEDURE — 99499 UNLISTED E&M SERVICE: CPT | Mod: S$GLB,,, | Performed by: NURSE PRACTITIONER

## 2021-05-21 PROCEDURE — G0439 PPPS, SUBSEQ VISIT: HCPCS | Mod: S$GLB,,, | Performed by: NURSE PRACTITIONER

## 2021-05-21 PROCEDURE — 1126F AMNT PAIN NOTED NONE PRSNT: CPT | Mod: S$GLB,,, | Performed by: NURSE PRACTITIONER

## 2021-05-21 PROCEDURE — 1101F PT FALLS ASSESS-DOCD LE1/YR: CPT | Mod: CPTII,S$GLB,, | Performed by: NURSE PRACTITIONER

## 2021-05-21 PROCEDURE — 3288F FALL RISK ASSESSMENT DOCD: CPT | Mod: CPTII,S$GLB,, | Performed by: NURSE PRACTITIONER

## 2021-05-21 PROCEDURE — 99499 RISK ADDL DX/OHS AUDIT: ICD-10-PCS | Mod: S$GLB,,, | Performed by: NURSE PRACTITIONER

## 2021-05-21 PROCEDURE — 1101F PR PT FALLS ASSESS DOC 0-1 FALLS W/OUT INJ PAST YR: ICD-10-PCS | Mod: CPTII,S$GLB,, | Performed by: NURSE PRACTITIONER

## 2021-05-21 PROCEDURE — 1158F PR ADVANCE CARE PLANNING DISCUSS DOCUMENTED IN MEDICAL RECORD: ICD-10-PCS | Mod: S$GLB,,, | Performed by: NURSE PRACTITIONER

## 2021-05-21 PROCEDURE — 1126F PR PAIN SEVERITY QUANTIFIED, NO PAIN PRESENT: ICD-10-PCS | Mod: S$GLB,,, | Performed by: NURSE PRACTITIONER

## 2021-05-21 PROCEDURE — 1158F ADVNC CARE PLAN TLK DOCD: CPT | Mod: S$GLB,,, | Performed by: NURSE PRACTITIONER

## 2021-05-28 ENCOUNTER — OFFICE VISIT (OUTPATIENT)
Dept: INTERNAL MEDICINE | Facility: CLINIC | Age: 86
End: 2021-05-28
Payer: MEDICARE

## 2021-05-28 VITALS
DIASTOLIC BLOOD PRESSURE: 62 MMHG | HEIGHT: 69 IN | SYSTOLIC BLOOD PRESSURE: 104 MMHG | WEIGHT: 129 LBS | HEART RATE: 64 BPM | BODY MASS INDEX: 19.11 KG/M2

## 2021-05-28 DIAGNOSIS — I10 ESSENTIAL HYPERTENSION: Primary | Chronic | ICD-10-CM

## 2021-05-28 DIAGNOSIS — F02.80 LATE ONSET ALZHEIMER'S DISEASE WITHOUT BEHAVIORAL DISTURBANCE: ICD-10-CM

## 2021-05-28 DIAGNOSIS — G30.1 LATE ONSET ALZHEIMER'S DISEASE WITHOUT BEHAVIORAL DISTURBANCE: ICD-10-CM

## 2021-05-28 DIAGNOSIS — E78.5 HYPERLIPIDEMIA, UNSPECIFIED HYPERLIPIDEMIA TYPE: Chronic | ICD-10-CM

## 2021-05-28 DIAGNOSIS — C61 PROSTATE CANCER: ICD-10-CM

## 2021-05-28 PROCEDURE — 99499 RISK ADDL DX/OHS AUDIT: ICD-10-PCS | Mod: S$GLB,,, | Performed by: INTERNAL MEDICINE

## 2021-05-28 PROCEDURE — 1125F AMNT PAIN NOTED PAIN PRSNT: CPT | Mod: S$GLB,,, | Performed by: INTERNAL MEDICINE

## 2021-05-28 PROCEDURE — 1101F PT FALLS ASSESS-DOCD LE1/YR: CPT | Mod: CPTII,S$GLB,, | Performed by: INTERNAL MEDICINE

## 2021-05-28 PROCEDURE — 1159F MED LIST DOCD IN RCRD: CPT | Mod: S$GLB,,, | Performed by: INTERNAL MEDICINE

## 2021-05-28 PROCEDURE — 1125F PR PAIN SEVERITY QUANTIFIED, PAIN PRESENT: ICD-10-PCS | Mod: S$GLB,,, | Performed by: INTERNAL MEDICINE

## 2021-05-28 PROCEDURE — 99999 PR PBB SHADOW E&M-EST. PATIENT-LVL III: CPT | Mod: PBBFAC,,, | Performed by: INTERNAL MEDICINE

## 2021-05-28 PROCEDURE — 3288F FALL RISK ASSESSMENT DOCD: CPT | Mod: CPTII,S$GLB,, | Performed by: INTERNAL MEDICINE

## 2021-05-28 PROCEDURE — 99214 OFFICE O/P EST MOD 30 MIN: CPT | Mod: S$GLB,,, | Performed by: INTERNAL MEDICINE

## 2021-05-28 PROCEDURE — 1159F PR MEDICATION LIST DOCUMENTED IN MEDICAL RECORD: ICD-10-PCS | Mod: S$GLB,,, | Performed by: INTERNAL MEDICINE

## 2021-05-28 PROCEDURE — 99999 PR PBB SHADOW E&M-EST. PATIENT-LVL III: ICD-10-PCS | Mod: PBBFAC,,, | Performed by: INTERNAL MEDICINE

## 2021-05-28 PROCEDURE — 1101F PR PT FALLS ASSESS DOC 0-1 FALLS W/OUT INJ PAST YR: ICD-10-PCS | Mod: CPTII,S$GLB,, | Performed by: INTERNAL MEDICINE

## 2021-05-28 PROCEDURE — 99214 PR OFFICE/OUTPT VISIT, EST, LEVL IV, 30-39 MIN: ICD-10-PCS | Mod: S$GLB,,, | Performed by: INTERNAL MEDICINE

## 2021-05-28 PROCEDURE — 3288F PR FALLS RISK ASSESSMENT DOCUMENTED: ICD-10-PCS | Mod: CPTII,S$GLB,, | Performed by: INTERNAL MEDICINE

## 2021-05-28 PROCEDURE — 99499 UNLISTED E&M SERVICE: CPT | Mod: S$GLB,,, | Performed by: INTERNAL MEDICINE

## 2021-05-31 PROBLEM — F02.80 LATE ONSET ALZHEIMER'S DISEASE WITHOUT BEHAVIORAL DISTURBANCE: Status: ACTIVE | Noted: 2021-05-31

## 2021-05-31 PROBLEM — G30.1 LATE ONSET ALZHEIMER'S DISEASE WITHOUT BEHAVIORAL DISTURBANCE: Status: ACTIVE | Noted: 2021-05-31

## 2021-05-31 PROBLEM — C61 PROSTATE CANCER: Status: ACTIVE | Noted: 2021-05-31

## 2022-04-25 ENCOUNTER — PATIENT MESSAGE (OUTPATIENT)
Dept: PHARMACY | Facility: CLINIC | Age: 87
End: 2022-04-25
Payer: MEDICARE

## 2022-04-25 ENCOUNTER — OFFICE VISIT (OUTPATIENT)
Dept: INTERNAL MEDICINE | Facility: CLINIC | Age: 87
End: 2022-04-25
Payer: MEDICARE

## 2022-04-25 VITALS
HEIGHT: 69 IN | BODY MASS INDEX: 19.17 KG/M2 | WEIGHT: 129.44 LBS | SYSTOLIC BLOOD PRESSURE: 162 MMHG | DIASTOLIC BLOOD PRESSURE: 78 MMHG | OXYGEN SATURATION: 99 % | HEART RATE: 75 BPM

## 2022-04-25 DIAGNOSIS — F02.80 LATE ONSET ALZHEIMER'S DISEASE WITHOUT BEHAVIORAL DISTURBANCE: Primary | ICD-10-CM

## 2022-04-25 DIAGNOSIS — G60.9 IDIOPATHIC PERIPHERAL NEUROPATHY: Chronic | ICD-10-CM

## 2022-04-25 DIAGNOSIS — E78.5 HYPERLIPIDEMIA, UNSPECIFIED HYPERLIPIDEMIA TYPE: ICD-10-CM

## 2022-04-25 DIAGNOSIS — M53.86 SCIATICA ASSOCIATED WITH DISORDER OF LUMBAR SPINE: ICD-10-CM

## 2022-04-25 DIAGNOSIS — G30.1 LATE ONSET ALZHEIMER'S DISEASE WITHOUT BEHAVIORAL DISTURBANCE: Primary | ICD-10-CM

## 2022-04-25 DIAGNOSIS — E78.2 MIXED HYPERLIPIDEMIA: Chronic | ICD-10-CM

## 2022-04-25 DIAGNOSIS — Z23 NEED FOR PNEUMOCOCCAL VACCINATION: ICD-10-CM

## 2022-04-25 DIAGNOSIS — C61 PROSTATE CANCER: ICD-10-CM

## 2022-04-25 DIAGNOSIS — I10 ESSENTIAL HYPERTENSION: Chronic | ICD-10-CM

## 2022-04-25 DIAGNOSIS — I70.0 AORTIC ATHEROSCLEROSIS: Chronic | ICD-10-CM

## 2022-04-25 DIAGNOSIS — R41.89 COGNITIVE IMPAIRMENT: ICD-10-CM

## 2022-04-25 DIAGNOSIS — Z23 NEED FOR SHINGLES VACCINE: ICD-10-CM

## 2022-04-25 DIAGNOSIS — N40.0 BENIGN NON-NODULAR PROSTATIC HYPERPLASIA WITHOUT LOWER URINARY TRACT SYMPTOMS: ICD-10-CM

## 2022-04-25 PROCEDURE — 1101F PT FALLS ASSESS-DOCD LE1/YR: CPT | Mod: CPTII,S$GLB,, | Performed by: NURSE PRACTITIONER

## 2022-04-25 PROCEDURE — 99999 PR PBB SHADOW E&M-EST. PATIENT-LVL III: ICD-10-PCS | Mod: PBBFAC,,, | Performed by: NURSE PRACTITIONER

## 2022-04-25 PROCEDURE — 1159F PR MEDICATION LIST DOCUMENTED IN MEDICAL RECORD: ICD-10-PCS | Mod: CPTII,S$GLB,, | Performed by: NURSE PRACTITIONER

## 2022-04-25 PROCEDURE — 99214 OFFICE O/P EST MOD 30 MIN: CPT | Mod: S$GLB,,, | Performed by: NURSE PRACTITIONER

## 2022-04-25 PROCEDURE — 1126F AMNT PAIN NOTED NONE PRSNT: CPT | Mod: CPTII,S$GLB,, | Performed by: NURSE PRACTITIONER

## 2022-04-25 PROCEDURE — 99999 PR PBB SHADOW E&M-EST. PATIENT-LVL III: CPT | Mod: PBBFAC,,, | Performed by: NURSE PRACTITIONER

## 2022-04-25 PROCEDURE — 1126F PR PAIN SEVERITY QUANTIFIED, NO PAIN PRESENT: ICD-10-PCS | Mod: CPTII,S$GLB,, | Performed by: NURSE PRACTITIONER

## 2022-04-25 PROCEDURE — 99214 PR OFFICE/OUTPT VISIT, EST, LEVL IV, 30-39 MIN: ICD-10-PCS | Mod: S$GLB,,, | Performed by: NURSE PRACTITIONER

## 2022-04-25 PROCEDURE — 3288F PR FALLS RISK ASSESSMENT DOCUMENTED: ICD-10-PCS | Mod: CPTII,S$GLB,, | Performed by: NURSE PRACTITIONER

## 2022-04-25 PROCEDURE — 1159F MED LIST DOCD IN RCRD: CPT | Mod: CPTII,S$GLB,, | Performed by: NURSE PRACTITIONER

## 2022-04-25 PROCEDURE — 1160F RVW MEDS BY RX/DR IN RCRD: CPT | Mod: CPTII,S$GLB,, | Performed by: NURSE PRACTITIONER

## 2022-04-25 PROCEDURE — 1160F PR REVIEW ALL MEDS BY PRESCRIBER/CLIN PHARMACIST DOCUMENTED: ICD-10-PCS | Mod: CPTII,S$GLB,, | Performed by: NURSE PRACTITIONER

## 2022-04-25 PROCEDURE — 3288F FALL RISK ASSESSMENT DOCD: CPT | Mod: CPTII,S$GLB,, | Performed by: NURSE PRACTITIONER

## 2022-04-25 PROCEDURE — 1101F PR PT FALLS ASSESS DOC 0-1 FALLS W/OUT INJ PAST YR: ICD-10-PCS | Mod: CPTII,S$GLB,, | Performed by: NURSE PRACTITIONER

## 2022-04-25 RX ORDER — TAMSULOSIN HYDROCHLORIDE 0.4 MG/1
1 CAPSULE ORAL DAILY
Qty: 90 CAPSULE | Refills: 3 | Status: SHIPPED | OUTPATIENT
Start: 2022-04-25 | End: 2023-10-16 | Stop reason: SDUPTHER

## 2022-04-25 RX ORDER — VERAPAMIL HYDROCHLORIDE 240 MG/1
240 TABLET, FILM COATED, EXTENDED RELEASE ORAL DAILY
Qty: 90 TABLET | Refills: 3 | Status: SHIPPED | OUTPATIENT
Start: 2022-04-25 | End: 2023-10-16 | Stop reason: SDUPTHER

## 2022-04-25 RX ORDER — GABAPENTIN 100 MG/1
100 CAPSULE ORAL DAILY
Qty: 90 CAPSULE | Refills: 3 | OUTPATIENT
Start: 2022-04-25 | End: 2022-04-25 | Stop reason: SDUPTHER

## 2022-04-25 RX ORDER — GABAPENTIN 100 MG/1
100 CAPSULE ORAL DAILY
Qty: 90 CAPSULE | Refills: 3 | Status: SHIPPED | OUTPATIENT
Start: 2022-04-25 | End: 2023-10-16 | Stop reason: SDUPTHER

## 2022-04-25 RX ORDER — TRIAMTERENE AND HYDROCHLOROTHIAZIDE 37.5; 25 MG/1; MG/1
1 CAPSULE ORAL DAILY
Qty: 90 CAPSULE | Refills: 3 | Status: SHIPPED | OUTPATIENT
Start: 2022-04-25 | End: 2023-10-16 | Stop reason: SDUPTHER

## 2022-04-25 RX ORDER — DONEPEZIL HYDROCHLORIDE 10 MG/1
10 TABLET, FILM COATED ORAL NIGHTLY
Qty: 90 TABLET | Refills: 3 | Status: SHIPPED | OUTPATIENT
Start: 2022-04-25 | End: 2023-10-16 | Stop reason: SDUPTHER

## 2022-04-25 RX ORDER — LOVASTATIN 20 MG/1
20 TABLET ORAL DAILY
Qty: 90 TABLET | Refills: 3 | Status: SHIPPED | OUTPATIENT
Start: 2022-04-25 | End: 2023-10-16 | Stop reason: SDUPTHER

## 2022-04-25 RX ORDER — FINASTERIDE 5 MG/1
5 TABLET, FILM COATED ORAL DAILY
Qty: 90 TABLET | Refills: 3 | Status: SHIPPED | OUTPATIENT
Start: 2022-04-25 | End: 2023-10-16 | Stop reason: SDUPTHER

## 2022-04-25 NOTE — PATIENT INSTRUCTIONS
Refilled meds    Check annual labs, will message with results    Pneumovax 20 and Shingles #1 vaccine today    Be sure to start taking medicines every day blood pressure was high today likely due to being out of meds

## 2022-04-25 NOTE — PROGRESS NOTES
Subjective:       Patient ID: Kevyn Leach is a 89 y.o. male.    Chief Complaint: Annual Exam    Pt of Dr. Lindquist, here for his annual.    Last OV and annual with PCP was 5-28-21    Present with wife and granddaughter. Pt has been out of meds for 2-3 months so has not been taking anything    No complaints today    Review of Systems   Constitutional: Negative for activity change, appetite change and unexpected weight change.   HENT: Negative for hearing loss and voice change.    Eyes: Negative for visual disturbance.   Respiratory: Negative for apnea, cough, chest tightness and shortness of breath.    Cardiovascular: Negative for chest pain, palpitations and leg swelling.   Gastrointestinal: Negative for abdominal distention, abdominal pain, blood in stool, constipation, diarrhea, nausea and vomiting.   Endocrine: Negative for cold intolerance, heat intolerance, polydipsia, polyphagia and polyuria.   Genitourinary: Negative for difficulty urinating, dysuria and penile pain.   Musculoskeletal: Negative for arthralgias and myalgias.   Integumentary:  Negative for color change, pallor, rash and wound.   Allergic/Immunologic: Negative for environmental allergies, food allergies and frequent infections.   Neurological: Positive for memory loss. Negative for dizziness, weakness, light-headedness and headaches.        Chronic dementia   Hematological: Negative for adenopathy. Does not bruise/bleed easily.   Psychiatric/Behavioral: Negative for agitation, behavioral problems, sleep disturbance and suicidal ideas.        Review of patient's allergies indicates:  No Known Allergies      Current Outpatient Medications:     donepeziL (ARICEPT) 10 MG tablet, Take 1 tablet (10 mg total) by mouth every evening., Disp: 90 tablet, Rfl: 3    finasteride (PROSCAR) 5 mg tablet, Take 1 tablet (5 mg total) by mouth once daily., Disp: 90 tablet, Rfl: 3    gabapentin (NEURONTIN) 100 MG capsule, Take 1 capsule (100 mg total) by mouth  once daily., Disp: 90 capsule, Rfl: 3    lovastatin (MEVACOR) 20 MG tablet, Take 1 tablet (20 mg total) by mouth once daily., Disp: 90 tablet, Rfl: 3    tamsulosin (FLOMAX) 0.4 mg Cap, Take 1 capsule (0.4 mg total) by mouth once daily., Disp: 90 capsule, Rfl: 3    triamterene-hydrochlorothiazide 37.5-25 mg (DYAZIDE) 37.5-25 mg per capsule, Take 1 capsule by mouth once daily., Disp: 90 capsule, Rfl: 3    verapamiL (CALAN-SR) 240 MG CR tablet, Take 1 tablet (240 mg total) by mouth once daily., Disp: 90 tablet, Rfl: 3    Patient Active Problem List   Diagnosis    Benign prostatic hyperplasia    History of colon polyps    GERD with esophagitis    Hyperlipidemia    Intraoperative floppy iris syndrome (IFIS)    Essential hypertension    Aortic atherosclerosis    Lumbar stenosis    Cervical spine arthritis    Idiopathic peripheral neuropathy    MCI (mild cognitive impairment)    Prostate cancer    Late onset Alzheimer's disease without behavioral disturbance     Past Medical History:   Diagnosis Date    Amblyopia     refractive amblyopia left eye ()    Arthritis     BPH (benign prostatic hypertrophy)     Colon polyp     Encephalopathy acute 3/27/2019    Generalized pain     Hyperlipidemia     Hypertension     URI (upper respiratory infection)      Past Surgical History:   Procedure Laterality Date    CATARACT EXTRACTION W/  INTRAOCULAR LENS IMPLANT Right n/a    done at [facility] before     CATARACT EXTRACTION W/  INTRAOCULAR LENS IMPLANT Left 2016    OS     EYE SURGERY      PROSTATE SURGERY      TRANSURETHRAL RESECTION OF PROSTATE       Social History     Socioeconomic History    Marital status:    Tobacco Use    Smoking status: Former Smoker     Quit date: 1987     Years since quittin.6    Smokeless tobacco: Never Used   Substance and Sexual Activity    Alcohol use: No    Drug use: No    Sexual activity: Not Currently   Social History  "Narrative    Lives with wife; 3 kids; 10 grandkids; 5 great grandkids    +driving day and night     Social Determinants of Health     Financial Resource Strain: Low Risk     Difficulty of Paying Living Expenses: Not hard at all   Food Insecurity: No Food Insecurity    Worried About Running Out of Food in the Last Year: Never true    Ran Out of Food in the Last Year: Never true   Transportation Needs: No Transportation Needs    Lack of Transportation (Medical): No    Lack of Transportation (Non-Medical): No   Physical Activity: Insufficiently Active    Days of Exercise per Week: 4 days    Minutes of Exercise per Session: 10 min   Stress: No Stress Concern Present    Feeling of Stress : Not at all   Social Connections: Moderately Integrated    Frequency of Communication with Friends and Family: More than three times a week    Frequency of Social Gatherings with Friends and Family: Three times a week    Attends Orthodoxy Services: 1 to 4 times per year    Active Member of Clubs or Organizations: No    Attends Club or Organization Meetings: Never    Marital Status:      Family History   Problem Relation Age of Onset    Diabetes Father     Diabetes Paternal Aunt     Cancer Brother     No Known Problems Mother     Heart disease Neg Hx     Amblyopia Neg Hx     Blindness Neg Hx     Cataracts Neg Hx     Glaucoma Neg Hx     Hypertension Neg Hx     Macular degeneration Neg Hx     Retinal detachment Neg Hx     Strabismus Neg Hx     Stroke Neg Hx     Thyroid disease Neg Hx            Objective:       Vitals:    04/25/22 1512   BP: (!) 162/78   Pulse: 75   SpO2: 99%   Weight: 58.7 kg (129 lb 6.6 oz)   Height: 5' 9" (1.753 m)   PainSc: 0-No pain     Body mass index is 19.11 kg/m².    Physical Exam  Vitals reviewed.   Constitutional:       Appearance: Normal appearance. He is well-developed and normal weight.   HENT:      Head: Normocephalic.      Right Ear: Tympanic membrane, ear canal and " external ear normal. There is no impacted cerumen.      Left Ear: Tympanic membrane, ear canal and external ear normal. There is no impacted cerumen.      Nose: Nose normal.      Mouth/Throat:      Mouth: Mucous membranes are moist.      Pharynx: Oropharynx is clear.   Eyes:      General: Lids are normal. Lids are everted, no foreign bodies appreciated.      Extraocular Movements: Extraocular movements intact.      Conjunctiva/sclera: Conjunctivae normal.      Pupils: Pupils are equal, round, and reactive to light.   Neck:      Vascular: No carotid bruit or JVD.      Trachea: Trachea normal.   Cardiovascular:      Rate and Rhythm: Normal rate and regular rhythm.      Pulses: Normal pulses.      Heart sounds: Normal heart sounds.   Pulmonary:      Effort: Pulmonary effort is normal.      Breath sounds: Normal breath sounds.   Abdominal:      General: Abdomen is flat. Bowel sounds are normal.      Palpations: Abdomen is soft.   Musculoskeletal:      Cervical back: Full passive range of motion without pain, normal range of motion and neck supple.      Comments: Walks with a cane   Skin:     General: Skin is warm and dry.      Capillary Refill: Capillary refill takes less than 2 seconds.   Neurological:      Mental Status: He is alert. Mental status is at baseline.   Psychiatric:         Mood and Affect: Mood normal.         Speech: Speech normal.         Behavior: Behavior normal.         Thought Content: Thought content normal.         Judgment: Judgment normal.         Assessment:       Problem List Items Addressed This Visit        Neuro    Idiopathic peripheral neuropathy (Chronic)    Late onset Alzheimer's disease without behavioral disturbance - Primary       Cardiac/Vascular    Hyperlipidemia (Chronic)    Relevant Medications    lovastatin (MEVACOR) 20 MG tablet    Other Relevant Orders    Lipid Panel    Essential hypertension (Chronic)    Relevant Medications    verapamiL (CALAN-SR) 240 MG CR tablet     triamterene-hydrochlorothiazide 37.5-25 mg (DYAZIDE) 37.5-25 mg per capsule    Other Relevant Orders    CBC Auto Differential    Comprehensive Metabolic Panel    Aortic atherosclerosis (Chronic)       Oncology    Prostate cancer      Other Visit Diagnoses     Need for shingles vaccine        Relevant Orders    (In Office Administered) Zoster Recombinant Vaccine    Need for pneumococcal vaccination        Relevant Orders    (In Office Administered) Pneumococcal Conjugate Vaccine (20 Valent) (IM)    Body mass index (BMI) of 19.0 to 19.9 in adult        Cognitive impairment        better with Aricept-  will incr dose    Relevant Medications    donepeziL (ARICEPT) 10 MG tablet    Sciatica associated with disorder of lumbar spine        Relevant Medications    gabapentin (NEURONTIN) 100 MG capsule    Benign non-nodular prostatic hyperplasia without lower urinary tract symptoms        Relevant Medications    tamsulosin (FLOMAX) 0.4 mg Cap          Plan:       Kevyn was seen today for annual exam.    Diagnoses and all orders for this visit:    Late onset Alzheimer's disease without behavioral disturbance  Refilled meds    Idiopathic peripheral neuropathy  Refilled gabapentin    Aortic atherosclerosis  Chronic, followed by PCP    Essential hypertension  -     CBC Auto Differential; Future  -     Comprehensive Metabolic Panel; Future  -     verapamiL (CALAN-SR) 240 MG CR tablet; Take 1 tablet (240 mg total) by mouth once daily.  -     triamterene-hydrochlorothiazide 37.5-25 mg (DYAZIDE) 37.5-25 mg per capsule; Take 1 capsule by mouth once daily.    Take medications as prescribed.    Monitor BP at home, goal BP < or = 140/80, call office if consistently above this range.    Follow low salt DASH diet and exercise.    BMI reviewed.    Go to ED if Headaches, blurred vision, chest pain, or SOB occurs along with elevated readings > or = 160/90.    Mixed hyperlipidemia  -     Lipid Panel; Future    Continue cholesterol med, low  fat diet, and exercise. Check lipids.    Prostate cancer  Followed by Urology    Need for shingles vaccine  -     (In Office Administered) Zoster Recombinant Vaccine    Need for pneumococcal vaccination  -     (In Office Administered) Pneumococcal Conjugate Vaccine (20 Valent) (IM)    Body mass index (BMI) of 19.0 to 19.9 in adult  BMI reviewed    Cognitive impairment  Comments:  better with Aricept-  will incr dose  Orders:  -     donepeziL (ARICEPT) 10 MG tablet; Take 1 tablet (10 mg total) by mouth every evening.    Sciatica associated with disorder of lumbar spine  -     gabapentin (NEURONTIN) 100 MG capsule; Take 1 capsule (100 mg total) by mouth once daily.    Hyperlipidemia, unspecified hyperlipidemia type  -     lovastatin (MEVACOR) 20 MG tablet; Take 1 tablet (20 mg total) by mouth once daily.    Benign non-nodular prostatic hyperplasia without lower urinary tract symptoms  -     tamsulosin (FLOMAX) 0.4 mg Cap; Take 1 capsule (0.4 mg total) by mouth once daily.    Other orders  -     finasteride (PROSCAR) 5 mg tablet; Take 1 tablet (5 mg total) by mouth once daily.    Refilled meds    Check annual labs, will message with results    Pneumovax 20 and Shingles #1 vaccine today    Be sure to start taking medicines every day blood pressure was high today likely due to being out of meds    Follow up in about 1 year (around 4/25/2023) for annual or sooner as needed with PCP Dr. Lindquist.

## 2022-05-02 ENCOUNTER — LAB VISIT (OUTPATIENT)
Dept: LAB | Facility: HOSPITAL | Age: 87
End: 2022-05-02
Attending: NURSE PRACTITIONER
Payer: MEDICARE

## 2022-05-02 DIAGNOSIS — I10 ESSENTIAL HYPERTENSION: Chronic | ICD-10-CM

## 2022-05-02 DIAGNOSIS — E78.2 MIXED HYPERLIPIDEMIA: Chronic | ICD-10-CM

## 2022-05-02 LAB
ALBUMIN SERPL BCP-MCNC: 3.3 G/DL (ref 3.5–5.2)
ALP SERPL-CCNC: 55 U/L (ref 55–135)
ALT SERPL W/O P-5'-P-CCNC: 5 U/L (ref 10–44)
ANION GAP SERPL CALC-SCNC: 7 MMOL/L (ref 8–16)
AST SERPL-CCNC: 17 U/L (ref 10–40)
BASOPHILS # BLD AUTO: 0.04 K/UL (ref 0–0.2)
BASOPHILS NFR BLD: 0.9 % (ref 0–1.9)
BILIRUB SERPL-MCNC: 0.5 MG/DL (ref 0.1–1)
BUN SERPL-MCNC: 7 MG/DL (ref 8–23)
CALCIUM SERPL-MCNC: 9.3 MG/DL (ref 8.7–10.5)
CHLORIDE SERPL-SCNC: 102 MMOL/L (ref 95–110)
CHOLEST SERPL-MCNC: 173 MG/DL (ref 120–199)
CHOLEST/HDLC SERPL: 3.8 {RATIO} (ref 2–5)
CO2 SERPL-SCNC: 29 MMOL/L (ref 23–29)
CREAT SERPL-MCNC: 0.9 MG/DL (ref 0.5–1.4)
DIFFERENTIAL METHOD: ABNORMAL
EOSINOPHIL # BLD AUTO: 0.1 K/UL (ref 0–0.5)
EOSINOPHIL NFR BLD: 1.6 % (ref 0–8)
ERYTHROCYTE [DISTWIDTH] IN BLOOD BY AUTOMATED COUNT: 14.1 % (ref 11.5–14.5)
EST. GFR  (AFRICAN AMERICAN): >60 ML/MIN/1.73 M^2
EST. GFR  (NON AFRICAN AMERICAN): >60 ML/MIN/1.73 M^2
GLUCOSE SERPL-MCNC: 77 MG/DL (ref 70–110)
HCT VFR BLD AUTO: 42.4 % (ref 40–54)
HDLC SERPL-MCNC: 45 MG/DL (ref 40–75)
HDLC SERPL: 26 % (ref 20–50)
HGB BLD-MCNC: 12.8 G/DL (ref 14–18)
IMM GRANULOCYTES # BLD AUTO: 0.01 K/UL (ref 0–0.04)
IMM GRANULOCYTES NFR BLD AUTO: 0.2 % (ref 0–0.5)
LDLC SERPL CALC-MCNC: 108.4 MG/DL (ref 63–159)
LYMPHOCYTES # BLD AUTO: 1.7 K/UL (ref 1–4.8)
LYMPHOCYTES NFR BLD: 39.8 % (ref 18–48)
MCH RBC QN AUTO: 27.3 PG (ref 27–31)
MCHC RBC AUTO-ENTMCNC: 30.2 G/DL (ref 32–36)
MCV RBC AUTO: 90 FL (ref 82–98)
MONOCYTES # BLD AUTO: 0.7 K/UL (ref 0.3–1)
MONOCYTES NFR BLD: 15.3 % (ref 4–15)
NEUTROPHILS # BLD AUTO: 1.8 K/UL (ref 1.8–7.7)
NEUTROPHILS NFR BLD: 42.2 % (ref 38–73)
NONHDLC SERPL-MCNC: 128 MG/DL
NRBC BLD-RTO: 0 /100 WBC
PLATELET # BLD AUTO: 227 K/UL (ref 150–450)
PMV BLD AUTO: 10.8 FL (ref 9.2–12.9)
POTASSIUM SERPL-SCNC: 3.5 MMOL/L (ref 3.5–5.1)
PROT SERPL-MCNC: 7.6 G/DL (ref 6–8.4)
RBC # BLD AUTO: 4.69 M/UL (ref 4.6–6.2)
SODIUM SERPL-SCNC: 138 MMOL/L (ref 136–145)
TRIGL SERPL-MCNC: 98 MG/DL (ref 30–150)
WBC # BLD AUTO: 4.37 K/UL (ref 3.9–12.7)

## 2022-05-02 PROCEDURE — 85025 COMPLETE CBC W/AUTO DIFF WBC: CPT | Performed by: NURSE PRACTITIONER

## 2022-05-02 PROCEDURE — 36415 COLL VENOUS BLD VENIPUNCTURE: CPT | Performed by: NURSE PRACTITIONER

## 2022-05-02 PROCEDURE — 80053 COMPREHEN METABOLIC PANEL: CPT | Performed by: NURSE PRACTITIONER

## 2022-05-02 PROCEDURE — 80061 LIPID PANEL: CPT | Performed by: NURSE PRACTITIONER

## 2022-08-23 ENCOUNTER — TELEPHONE (OUTPATIENT)
Dept: UROLOGY | Facility: CLINIC | Age: 87
End: 2022-08-23
Payer: MEDICARE

## 2022-08-23 NOTE — TELEPHONE ENCOUNTER
"Spoke with pt's granddaughter, Barbara. Advised Barbara that pt's appt will need to be r/s due to Jess being out of clinic this week. Offered appt on 9/2/22 at 8:30 and Barbara declined stating "that will not work I will have to find him another provider" appt canceled. No further questions or concerns noted.   "

## 2022-08-25 ENCOUNTER — OFFICE VISIT (OUTPATIENT)
Dept: UROLOGY | Facility: CLINIC | Age: 87
End: 2022-08-25
Payer: MEDICARE

## 2022-08-25 VITALS
RESPIRATION RATE: 18 BRPM | WEIGHT: 123.44 LBS | HEIGHT: 69 IN | HEART RATE: 75 BPM | SYSTOLIC BLOOD PRESSURE: 160 MMHG | DIASTOLIC BLOOD PRESSURE: 90 MMHG | BODY MASS INDEX: 18.28 KG/M2

## 2022-08-25 DIAGNOSIS — N40.1 BENIGN PROSTATIC HYPERPLASIA WITH LOWER URINARY TRACT SYMPTOMS, SYMPTOM DETAILS UNSPECIFIED: Primary | ICD-10-CM

## 2022-08-25 DIAGNOSIS — R30.0 DYSURIA: ICD-10-CM

## 2022-08-25 LAB
BILIRUB SERPL-MCNC: NEGATIVE MG/DL
BLOOD URINE, POC: NEGATIVE
CLARITY, POC UA: CLEAR
COLOR, POC UA: NORMAL
GLUCOSE UR QL STRIP: NEGATIVE
KETONES UR QL STRIP: NEGATIVE
LEUKOCYTE ESTERASE URINE, POC: NEGATIVE
NITRITE, POC UA: NEGATIVE
PH, POC UA: 7
POC RESIDUAL URINE VOLUME: 3 ML (ref 0–100)
PROTEIN, POC: NORMAL
SPECIFIC GRAVITY, POC UA: 1.01
UROBILINOGEN, POC UA: NEGATIVE

## 2022-08-25 PROCEDURE — 1126F PR PAIN SEVERITY QUANTIFIED, NO PAIN PRESENT: ICD-10-PCS | Mod: CPTII,S$GLB,, | Performed by: NURSE PRACTITIONER

## 2022-08-25 PROCEDURE — 1159F PR MEDICATION LIST DOCUMENTED IN MEDICAL RECORD: ICD-10-PCS | Mod: CPTII,S$GLB,, | Performed by: NURSE PRACTITIONER

## 2022-08-25 PROCEDURE — 1160F RVW MEDS BY RX/DR IN RCRD: CPT | Mod: CPTII,S$GLB,, | Performed by: NURSE PRACTITIONER

## 2022-08-25 PROCEDURE — 1126F AMNT PAIN NOTED NONE PRSNT: CPT | Mod: CPTII,S$GLB,, | Performed by: NURSE PRACTITIONER

## 2022-08-25 PROCEDURE — 51798 US URINE CAPACITY MEASURE: CPT | Mod: S$GLB,,, | Performed by: NURSE PRACTITIONER

## 2022-08-25 PROCEDURE — 1159F MED LIST DOCD IN RCRD: CPT | Mod: CPTII,S$GLB,, | Performed by: NURSE PRACTITIONER

## 2022-08-25 PROCEDURE — 99203 PR OFFICE/OUTPT VISIT, NEW, LEVL III, 30-44 MIN: ICD-10-PCS | Mod: S$GLB,,, | Performed by: NURSE PRACTITIONER

## 2022-08-25 PROCEDURE — 51798 POCT BLADDER SCAN: ICD-10-PCS | Mod: S$GLB,,, | Performed by: NURSE PRACTITIONER

## 2022-08-25 PROCEDURE — 99999 PR PBB SHADOW E&M-EST. PATIENT-LVL III: CPT | Mod: PBBFAC,,, | Performed by: NURSE PRACTITIONER

## 2022-08-25 PROCEDURE — 87086 URINE CULTURE/COLONY COUNT: CPT | Performed by: NURSE PRACTITIONER

## 2022-08-25 PROCEDURE — 81002 POCT URINE DIPSTICK WITHOUT MICROSCOPE: ICD-10-PCS | Mod: S$GLB,,, | Performed by: NURSE PRACTITIONER

## 2022-08-25 PROCEDURE — 99999 PR PBB SHADOW E&M-EST. PATIENT-LVL III: ICD-10-PCS | Mod: PBBFAC,,, | Performed by: NURSE PRACTITIONER

## 2022-08-25 PROCEDURE — 99203 OFFICE O/P NEW LOW 30 MIN: CPT | Mod: S$GLB,,, | Performed by: NURSE PRACTITIONER

## 2022-08-25 PROCEDURE — 1160F PR REVIEW ALL MEDS BY PRESCRIBER/CLIN PHARMACIST DOCUMENTED: ICD-10-PCS | Mod: CPTII,S$GLB,, | Performed by: NURSE PRACTITIONER

## 2022-08-25 PROCEDURE — 81002 URINALYSIS NONAUTO W/O SCOPE: CPT | Mod: S$GLB,,, | Performed by: NURSE PRACTITIONER

## 2022-08-25 NOTE — PROGRESS NOTES
CHIEF COMPLAINT:    Mr. Leach is a 89 y.o. male presenting for microscopic hematuria.      PRESENTING ILLNESS:    Kevyn Leach is a 89 y.o. male with a PMH of lumbar stenosis, hld, htn, bph, who presents for microscopic hematuria.    New patient to urology department.  Presents today due to microscopic hematuria.  Evaluated in the ED 8/17/22 for lower back pain.  Found to have microscopic hematuria. Obtained CT renal stone study, which was remarkable for mild right hydronephrosis.  Prescribed both finasteride and tamsulosin, but unsure if patient is taking medication.  Reports patient has history of decreased congnition and may not remember to take medication.  He reports feeling well today and has no complaints.    Spoke with daughter, Barbara, via iphone.    REVIEW OF SYSTEMS:    Review of Systems   Constitutional: Negative for chills and fever.   Respiratory: Negative for shortness of breath.    Cardiovascular: Negative for chest pain.   Gastrointestinal: Negative for constipation and diarrhea.   Genitourinary: Negative for dysuria, flank pain, frequency, hematuria and urgency.   Musculoskeletal: Positive for back pain.   Neurological: Negative for dizziness and weakness.       PATIENT HISTORY:    Past Medical History:   Diagnosis Date    Amblyopia     refractive amblyopia left eye ()    Arthritis     BPH (benign prostatic hypertrophy)     Colon polyp     Encephalopathy acute 3/27/2019    Generalized pain     Hyperlipidemia     Hypertension     URI (upper respiratory infection)        Family History   Problem Relation Age of Onset    Diabetes Father     Diabetes Paternal Aunt     Cancer Brother     No Known Problems Mother     Heart disease Neg Hx     Amblyopia Neg Hx     Blindness Neg Hx     Cataracts Neg Hx     Glaucoma Neg Hx     Hypertension Neg Hx     Macular degeneration Neg Hx     Retinal detachment Neg Hx     Strabismus Neg Hx     Stroke Neg Hx     Thyroid disease Neg  Hx        Allergies:  Patient has no known allergies.    Medications:    Current Outpatient Medications:     donepeziL (ARICEPT) 10 MG tablet, Take 1 tablet (10 mg total) by mouth every evening., Disp: 90 tablet, Rfl: 3    finasteride (PROSCAR) 5 mg tablet, Take 1 tablet (5 mg total) by mouth once daily., Disp: 90 tablet, Rfl: 3    gabapentin (NEURONTIN) 100 MG capsule, Take 1 capsule (100 mg total) by mouth once daily., Disp: 90 capsule, Rfl: 3    lovastatin (MEVACOR) 20 MG tablet, Take 1 tablet (20 mg total) by mouth once daily., Disp: 90 tablet, Rfl: 3    tamsulosin (FLOMAX) 0.4 mg Cap, Take 1 capsule (0.4 mg total) by mouth once daily., Disp: 90 capsule, Rfl: 3    triamterene-hydrochlorothiazide 37.5-25 mg (DYAZIDE) 37.5-25 mg per capsule, Take 1 capsule by mouth once daily., Disp: 90 capsule, Rfl: 3    verapamiL (CALAN-SR) 240 MG CR tablet, Take 1 tablet (240 mg total) by mouth once daily., Disp: 90 tablet, Rfl: 3    PHYSICAL EXAMINATION:    Physical Exam  Vitals and nursing note reviewed.   Constitutional:       Appearance: Normal appearance. He is well-developed.   HENT:      Head: Normocephalic and atraumatic.   Eyes:      Pupils: Pupils are equal, round, and reactive to light.   Pulmonary:      Effort: Pulmonary effort is normal.   Musculoskeletal:         General: Normal range of motion.      Cervical back: Normal range of motion.   Skin:     General: Skin is warm and dry.   Neurological:      Mental Status: He is alert and oriented to person, place, and time.   Psychiatric:         Behavior: Behavior normal.           LABS:    U/a performed in office today: dark yellow, ph 7, 1.010, trace protein    Lab Results   Component Value Date    PSA 4.5 (H) 05/15/2014    PSA 5.0 (H) 03/11/2011    PSA 4.0 03/22/2010    PSA 3.0 10/14/2008    PSA 2.4 09/26/2007    PSA 2.8 09/11/2006    PSA 2.4 05/17/2005    PSA 5.0 (H) 02/26/2004       IMPRESSION:  Encounter Diagnoses   Name Primary?    Benign prostatic  hyperplasia with lower urinary tract symptoms, symptom details unspecified Yes    Dysuria      CT renal stone study 8/17/22:  Impression:   1. Mild right sided hydronephrosis without radiopaque calculus visualized. Findings may be related to infection, ureteral stricture, or a radiolucent stone. Recommend correlation with urinalysis.   2. Severe prostatomegaly.       PLAN:  Problem List Items Addressed This Visit     Benign prostatic hyperplasia - Primary (Chronic)      Other Visit Diagnoses     Dysuria        Relevant Orders    POCT URINE DIPSTICK WITHOUT MICROSCOPE    POCT Bladder Scan          1. bph with obstruction    - continue tamsulosin and finasteride  2. Microscopic hematuria   - denies gross hematuria   - send urine for culture  3. rtc prn    I spent 30 minutes with the patient. Over 50% of the visit was spent in counseling.      Jess Shine NP

## 2022-08-26 LAB
BACTERIA UR CULT: NORMAL
BACTERIA UR CULT: NORMAL

## 2022-11-07 ENCOUNTER — OFFICE VISIT (OUTPATIENT)
Dept: UROLOGY | Facility: CLINIC | Age: 87
End: 2022-11-07
Payer: MEDICARE

## 2022-11-07 ENCOUNTER — LAB VISIT (OUTPATIENT)
Dept: LAB | Facility: HOSPITAL | Age: 87
End: 2022-11-07
Payer: MEDICARE

## 2022-11-07 VITALS
DIASTOLIC BLOOD PRESSURE: 91 MMHG | HEART RATE: 77 BPM | SYSTOLIC BLOOD PRESSURE: 166 MMHG | BODY MASS INDEX: 18.22 KG/M2 | WEIGHT: 123 LBS | HEIGHT: 69 IN

## 2022-11-07 DIAGNOSIS — R31.0 GROSS HEMATURIA: ICD-10-CM

## 2022-11-07 DIAGNOSIS — R31.0 GROSS HEMATURIA: Primary | ICD-10-CM

## 2022-11-07 LAB
CREAT SERPL-MCNC: 1 MG/DL (ref 0.5–1.4)
EST. GFR  (NO RACE VARIABLE): >60 ML/MIN/1.73 M^2

## 2022-11-07 PROCEDURE — 82565 ASSAY OF CREATININE: CPT | Performed by: NURSE PRACTITIONER

## 2022-11-07 PROCEDURE — 1126F AMNT PAIN NOTED NONE PRSNT: CPT | Mod: CPTII,S$GLB,, | Performed by: NURSE PRACTITIONER

## 2022-11-07 PROCEDURE — 99214 PR OFFICE/OUTPT VISIT, EST, LEVL IV, 30-39 MIN: ICD-10-PCS | Mod: S$GLB,,, | Performed by: NURSE PRACTITIONER

## 2022-11-07 PROCEDURE — 1160F RVW MEDS BY RX/DR IN RCRD: CPT | Mod: CPTII,S$GLB,, | Performed by: NURSE PRACTITIONER

## 2022-11-07 PROCEDURE — 36415 COLL VENOUS BLD VENIPUNCTURE: CPT | Performed by: NURSE PRACTITIONER

## 2022-11-07 PROCEDURE — 3288F FALL RISK ASSESSMENT DOCD: CPT | Mod: CPTII,S$GLB,, | Performed by: NURSE PRACTITIONER

## 2022-11-07 PROCEDURE — 3288F PR FALLS RISK ASSESSMENT DOCUMENTED: ICD-10-PCS | Mod: CPTII,S$GLB,, | Performed by: NURSE PRACTITIONER

## 2022-11-07 PROCEDURE — 99999 PR PBB SHADOW E&M-EST. PATIENT-LVL III: ICD-10-PCS | Mod: PBBFAC,,, | Performed by: NURSE PRACTITIONER

## 2022-11-07 PROCEDURE — 1101F PR PT FALLS ASSESS DOC 0-1 FALLS W/OUT INJ PAST YR: ICD-10-PCS | Mod: CPTII,S$GLB,, | Performed by: NURSE PRACTITIONER

## 2022-11-07 PROCEDURE — 88112 CYTOPATH CELL ENHANCE TECH: CPT | Mod: 26,,, | Performed by: PATHOLOGY

## 2022-11-07 PROCEDURE — 81001 URINALYSIS AUTO W/SCOPE: CPT | Performed by: NURSE PRACTITIONER

## 2022-11-07 PROCEDURE — 99999 PR PBB SHADOW E&M-EST. PATIENT-LVL III: CPT | Mod: PBBFAC,,, | Performed by: NURSE PRACTITIONER

## 2022-11-07 PROCEDURE — 87086 URINE CULTURE/COLONY COUNT: CPT | Performed by: NURSE PRACTITIONER

## 2022-11-07 PROCEDURE — 88112 CYTOPATH CELL ENHANCE TECH: CPT | Performed by: PATHOLOGY

## 2022-11-07 PROCEDURE — 1160F PR REVIEW ALL MEDS BY PRESCRIBER/CLIN PHARMACIST DOCUMENTED: ICD-10-PCS | Mod: CPTII,S$GLB,, | Performed by: NURSE PRACTITIONER

## 2022-11-07 PROCEDURE — 99214 OFFICE O/P EST MOD 30 MIN: CPT | Mod: S$GLB,,, | Performed by: NURSE PRACTITIONER

## 2022-11-07 PROCEDURE — 1101F PT FALLS ASSESS-DOCD LE1/YR: CPT | Mod: CPTII,S$GLB,, | Performed by: NURSE PRACTITIONER

## 2022-11-07 PROCEDURE — 88112 PR  CYTOPATH, CELL ENHANCE TECH: ICD-10-PCS | Mod: 26,,, | Performed by: PATHOLOGY

## 2022-11-07 PROCEDURE — 1159F MED LIST DOCD IN RCRD: CPT | Mod: CPTII,S$GLB,, | Performed by: NURSE PRACTITIONER

## 2022-11-07 PROCEDURE — 1126F PR PAIN SEVERITY QUANTIFIED, NO PAIN PRESENT: ICD-10-PCS | Mod: CPTII,S$GLB,, | Performed by: NURSE PRACTITIONER

## 2022-11-07 PROCEDURE — 1159F PR MEDICATION LIST DOCUMENTED IN MEDICAL RECORD: ICD-10-PCS | Mod: CPTII,S$GLB,, | Performed by: NURSE PRACTITIONER

## 2022-11-07 RX ORDER — CIPROFLOXACIN 500 MG/1
500 TABLET ORAL ONCE
Status: CANCELLED | OUTPATIENT
Start: 2022-11-07 | End: 2022-11-07

## 2022-11-07 RX ORDER — LIDOCAINE HYDROCHLORIDE 20 MG/ML
JELLY TOPICAL ONCE
Status: CANCELLED | OUTPATIENT
Start: 2022-11-07 | End: 2022-11-07

## 2022-11-07 NOTE — PROGRESS NOTES
CHIEF COMPLAINT:    Mr. Leach is a 89 y.o. male presenting for gross hematuria.      PRESENTING ILLNESS:    Kevyn Leach is a 89 y.o. male with a PMH of lumbar stenosis, hld, htn, bph, who presents for gross hematuria.    Established patient to urology department. Presents today due to gross hematuria.  Has had microscopic hematuria in the past. CT renal stone study obtained 8/17/22, which was remarkable for mild right hydronephrosis.  Prescribed both finasteride and tamsulosin, but unsure if patient is taking medication.  Reports patient has history of decreased congnition and may not remember to take medication.  He reports feeling well today and has no complaints.    Previous/Current Smoker: no  Radiation therapy to pelvis: no  Chemotherapy: no  Personal/ family history of bladder/ kidney cancer: no  Exposure to harmful chemicals: no  History of kidney stones: no      REVIEW OF SYSTEMS:    Review of Systems   Constitutional:  Negative for chills and fever.   Respiratory:  Negative for shortness of breath.    Cardiovascular:  Negative for chest pain.   Gastrointestinal:  Negative for constipation and diarrhea.   Genitourinary:  Negative for dysuria, flank pain, frequency, hematuria and urgency.   Musculoskeletal:  Positive for back pain.   Neurological:  Negative for dizziness and weakness.     PATIENT HISTORY:    Past Medical History:   Diagnosis Date    Amblyopia     refractive amblyopia left eye ()    Arthritis     BPH (benign prostatic hypertrophy)     Colon polyp     Encephalopathy acute 3/27/2019    Generalized pain     Hyperlipidemia     Hypertension     URI (upper respiratory infection)        Family History   Problem Relation Age of Onset    Diabetes Father     Diabetes Paternal Aunt     Cancer Brother     No Known Problems Mother     Heart disease Neg Hx     Amblyopia Neg Hx     Blindness Neg Hx     Cataracts Neg Hx     Glaucoma Neg Hx     Hypertension Neg Hx     Macular degeneration Neg Hx      Retinal detachment Neg Hx     Strabismus Neg Hx     Stroke Neg Hx     Thyroid disease Neg Hx        Allergies:  Patient has no known allergies.    Medications:    Current Outpatient Medications:     donepeziL (ARICEPT) 10 MG tablet, Take 1 tablet (10 mg total) by mouth every evening., Disp: 90 tablet, Rfl: 3    finasteride (PROSCAR) 5 mg tablet, Take 1 tablet (5 mg total) by mouth once daily., Disp: 90 tablet, Rfl: 3    gabapentin (NEURONTIN) 100 MG capsule, Take 1 capsule (100 mg total) by mouth once daily., Disp: 90 capsule, Rfl: 3    lovastatin (MEVACOR) 20 MG tablet, Take 1 tablet (20 mg total) by mouth once daily., Disp: 90 tablet, Rfl: 3    tamsulosin (FLOMAX) 0.4 mg Cap, Take 1 capsule (0.4 mg total) by mouth once daily., Disp: 90 capsule, Rfl: 3    triamterene-hydrochlorothiazide 37.5-25 mg (DYAZIDE) 37.5-25 mg per capsule, Take 1 capsule by mouth once daily., Disp: 90 capsule, Rfl: 3    verapamiL (CALAN-SR) 240 MG CR tablet, Take 1 tablet (240 mg total) by mouth once daily., Disp: 90 tablet, Rfl: 3    PHYSICAL EXAMINATION:    Physical Exam  Vitals and nursing note reviewed.   Constitutional:       Appearance: Normal appearance. He is well-developed.   HENT:      Head: Normocephalic and atraumatic.   Eyes:      Pupils: Pupils are equal, round, and reactive to light.   Pulmonary:      Effort: Pulmonary effort is normal.   Musculoskeletal:         General: Normal range of motion.      Cervical back: Normal range of motion.   Skin:     General: Skin is warm and dry.   Neurological:      Mental Status: He is alert and oriented to person, place, and time.   Psychiatric:         Behavior: Behavior normal.         LABS:    U/a performed in office today: dark yellow, ph 7, 1.010, trace protein    Lab Results   Component Value Date    PSA 4.5 (H) 05/15/2014    PSA 5.0 (H) 03/11/2011    PSA 4.0 03/22/2010    PSA 3.0 10/14/2008    PSA 2.4 09/26/2007    PSA 2.8 09/11/2006    PSA 2.4 05/17/2005    PSA 5.0 (H)  02/26/2004       IMPRESSION:  Encounter Diagnoses   Name Primary?    Gross hematuria Yes     CT renal stone study 8/17/22:  Impression:   1. Mild right sided hydronephrosis without radiopaque calculus visualized. Findings may be related to infection, ureteral stricture, or a radiolucent stone. Recommend correlation with urinalysis.   2. Severe prostatomegaly.       PLAN:  Problem List Items Addressed This Visit    None  Visit Diagnoses       Gross hematuria    -  Primary    Relevant Orders    POCT URINE DIPSTICK WITHOUT MICROSCOPE    POCT Bladder Scan    CT Urogram Abd Pelvis W WO    Cystoscopy    Urine culture    Cytology, Urine    Urinalysis Microscopic            1. bph with obstruction    - continue tamsulosin and finasteride  2. Microscopic/gross hematuria  I explained to the patient that the causes of hematuria, whether it be gross hematuria or microhematuria, are many, including but not limited to  infections, kidney stones,  malignancy. Fortunately, for patients with microhematuria, the likelihood of finding an underlying  malignancy as the cause of the hematuria is very low at 1-2%. In patients with gross hematuria, the chances of an underlying  malignancy are higher but still low at 15-20%.    Nevertheless, I explained to the patient that the evaluation in both cases consists of upper tract imaging followed by flexible cystoscopy. I described the rationale and procedure for both and answered all questions.    Hematuria work up discussed in detail.  Will proceed with hematuria work up:  Creatinine prior to CT urogram to ensure adequate kidney function.   Cysto  CT urogram   Urine cytology  Urine culture    3. rtc for above appointments    I spent 30 minutes with the patient. Over 50% of the visit was spent in counseling.      Jess Shine NP

## 2022-11-08 LAB
BACTERIA #/AREA URNS AUTO: ABNORMAL /HPF
MICROSCOPIC COMMENT: ABNORMAL
RBC #/AREA URNS AUTO: 8 /HPF (ref 0–4)
WBC #/AREA URNS AUTO: 1 /HPF (ref 0–5)

## 2022-11-09 LAB
BACTERIA UR CULT: NORMAL
BACTERIA UR CULT: NORMAL
FINAL PATHOLOGIC DIAGNOSIS: NORMAL
Lab: NORMAL

## 2022-11-12 ENCOUNTER — HOSPITAL ENCOUNTER (OUTPATIENT)
Dept: RADIOLOGY | Facility: HOSPITAL | Age: 87
Discharge: HOME | End: 2022-11-12
Attending: NURSE PRACTITIONER
Payer: MEDICARE

## 2022-11-12 DIAGNOSIS — R31.0 GROSS HEMATURIA: ICD-10-CM

## 2022-11-12 PROCEDURE — 25500020 PHARM REV CODE 255: Performed by: NURSE PRACTITIONER

## 2022-11-12 PROCEDURE — 74178 CT ABD&PLV WO CNTR FLWD CNTR: CPT | Mod: 26,,, | Performed by: RADIOLOGY

## 2022-11-12 PROCEDURE — 74178 CT UROGRAM ABD PELVIS W WO: ICD-10-PCS | Mod: 26,,, | Performed by: RADIOLOGY

## 2022-11-12 PROCEDURE — 74178 CT ABD&PLV WO CNTR FLWD CNTR: CPT | Mod: TC

## 2022-11-12 RX ADMIN — IOHEXOL 100 ML: 350 INJECTION, SOLUTION INTRAVENOUS at 02:11

## 2022-11-29 ENCOUNTER — TELEPHONE (OUTPATIENT)
Dept: UROLOGY | Facility: CLINIC | Age: 87
End: 2022-11-29
Payer: MEDICARE

## 2022-11-29 ENCOUNTER — PROCEDURE VISIT (OUTPATIENT)
Dept: UROLOGY | Facility: CLINIC | Age: 87
End: 2022-11-29
Payer: MEDICARE

## 2022-11-29 VITALS
RESPIRATION RATE: 16 BRPM | DIASTOLIC BLOOD PRESSURE: 85 MMHG | HEART RATE: 76 BPM | TEMPERATURE: 99 F | SYSTOLIC BLOOD PRESSURE: 166 MMHG | BODY MASS INDEX: 18.22 KG/M2 | HEIGHT: 69 IN | WEIGHT: 123 LBS

## 2022-11-29 DIAGNOSIS — N13.30 HYDRONEPHROSIS, UNSPECIFIED HYDRONEPHROSIS TYPE: Primary | ICD-10-CM

## 2022-11-29 DIAGNOSIS — R31.0 GROSS HEMATURIA: ICD-10-CM

## 2022-11-29 PROCEDURE — 52000 CYSTOURETHROSCOPY: CPT | Mod: S$GLB,,, | Performed by: UROLOGY

## 2022-11-29 PROCEDURE — 52000 PR CYSTOURETHROSCOPY: ICD-10-PCS | Mod: S$GLB,,, | Performed by: UROLOGY

## 2022-11-29 RX ORDER — LIDOCAINE HYDROCHLORIDE 20 MG/ML
JELLY TOPICAL ONCE
Status: COMPLETED | OUTPATIENT
Start: 2022-11-29 | End: 2022-11-29

## 2022-11-29 RX ADMIN — LIDOCAINE HYDROCHLORIDE: 20 JELLY TOPICAL at 02:11

## 2022-11-29 NOTE — TELEPHONE ENCOUNTER
----- Message from Aarti Geronimo LPN sent at 11/29/2022  2:13 PM CST -----  3 month renal US. Thanks!

## 2022-11-29 NOTE — PROCEDURES
Cystoscopy    Date/Time: 11/29/2022 1:15 PM  Performed by: Satnam Perea Jr., MD  Authorized by: Jess Shine NP     Consent Done?:  Yes (Written)  Local anesthesia used?: No    Anesthesia:  Lidocaine jelly  Indications: BPH    Anesthesia:  Lidocaine jelly  Scope type:  Flexible cystoscope  Stent inserted: No    Stent removed: No    Digital exam performed: No    Urethra normal: Yes    Prostate normal: trilobar bph w opening.    Bladder normal: Yes    Comments:      Cont flomax and proscar  Rt mild hydro asx  RTC 3 mos w pvr and renal us  Lasix scan prn

## 2022-11-29 NOTE — PATIENT INSTRUCTIONS
What to Expect After a Cystoscopy  For the next 24-48 hours, you may feel a mild burning when you urinate. This burning is normal and expected. Drink plenty of water to dilute the urine to help relieve the burning sensation. You may also see a small amount of blood in your urine after the procedure.        Signs and Symptoms to Report  Call the [facility] at [phone] if you develop any of the following:  Fever of 101 degrees or higher  Chills or persistent bleeding  Inability to urinate

## 2023-09-14 NOTE — SUBJECTIVE & OBJECTIVE
Past Medical History:   Diagnosis Date    Amblyopia     refractive amblyopia left eye ()    Arthritis     BPH (benign prostatic hypertrophy)     Colon polyp     Generalized pain     Hyperlipidemia     Hypertension     URI (upper respiratory infection)        Past Surgical History:   Procedure Laterality Date    CATARACT EXTRACTION W/  INTRAOCULAR LENS IMPLANT Right n/a    done at [facility] before 2004    CATARACT EXTRACTION W/  INTRAOCULAR LENS IMPLANT Left 11/30/2016    OS     EYE SURGERY      INSERTION-INTRAOCULAR LENS (IOL) Left 11/30/2016    [facility]    PHACOEMULSIFICATION-ASPIRATION-CATARACT Left 11/30/2016    Performed by [facility]nam[facility] Darling [facility] at [facility]    PROSTATE SURGERY      TRANSURETHRAL RESECTION OF PROSTATE         Review of patient's allergies indicates:  No Known Allergies    No current facility-administered medications on file prior to encounter.      Current Outpatient Medications on File Prior to Encounter   Medication Sig    acetaminophen with codeine (TYLENOL-CODEINE #3 ORAL) Take by mouth.    donepezil (ARICEPT) 10 MG tablet Take 1 tablet (10 mg total) by mouth every evening.    finasteride (PROSCAR) 5 mg tablet TAKE ONE TABLET BY MOUTH ONCE DAILY    gabapentin (NEURONTIN) 100 MG capsule Take 1 capsule (100 mg total) by mouth once daily. (Patient taking differently: Take 100 mg by mouth every evening. )    lovastatin (MEVACOR) 20 MG tablet TAKE ONE TABLET BY MOUTH ONCE DAILY    tamsulosin (FLOMAX) 0.4 mg Cp24 TAKE ONE CAPSULE BY MOUTH ONCE DAILY    traMADol (ULTRAM) 50 mg tablet Take 50 mg by mouth every 6 (six) hours as needed for Pain.    triamterene-hydrochlorothiazide 37.5-25 mg (DYAZIDE) 37.5-25 mg per capsule TAKE ONE CAPSULE BY MOUTH ONCE DAILY (Patient taking differently: TAKE ONE CAPSULE BY MOUTH ONCE DAILY takes at noon)    verapamil (CALAN-SR) 240 MG CR tablet TAKE ONE TABLET BY MOUTH  ONCE DAILY    docusate sodium (COLACE) 100 MG capsule Take 1 capsule (100 mg total) by mouth 2 (two) times daily.    hydrocortisone 2.5 % cream Apply topically 2 (two) times daily.    meloxicam (MOBIC) 15 MG tablet Take 1 tablet (15 mg total) by mouth once daily.    meloxicam (MOBIC) 15 MG tablet TAKE ONE TABLET BY MOUTH ONCE DAILY    polyethylene glycol (GLYCOLAX) 17 gram/dose powder Take 17 g by mouth once daily.     Family History     Problem Relation (Age of Onset)    Cancer Brother    Diabetes Father, Paternal Aunt    No Known Problems Mother        Tobacco Use    Smoking status: Former Smoker     Last attempt to quit: 1987     Years since quittin.5    Smokeless tobacco: Never Used   Substance and Sexual Activity    Alcohol use: No    Drug use: No    Sexual activity: Never     Review of Systems   Constitutional: Negative for activity change, appetite change, chills, diaphoresis, fatigue, fever and unexpected weight change.   HENT: Negative for congestion, rhinorrhea, sore throat, trouble swallowing and voice change.    Eyes: Negative for visual disturbance.   Respiratory: Negative for cough, choking, chest tightness, shortness of breath and wheezing.    Cardiovascular: Negative for chest pain, palpitations and leg swelling.   Gastrointestinal: Negative for abdominal distention, abdominal pain, anal bleeding, blood in stool, constipation, diarrhea, nausea and vomiting.   Endocrine: Negative for cold intolerance, heat intolerance, polydipsia and polyuria.   Genitourinary: Negative for dysuria, flank pain, frequency, hematuria and urgency.   Musculoskeletal: Positive for arthralgias and myalgias. Negative for back pain and joint swelling.   Skin: Negative for color change and rash.   Neurological: Negative for dizziness, seizures, syncope, facial asymmetry, speech difficulty, weakness, light-headedness, numbness and headaches.   Hematological: Negative for adenopathy. Does not bruise/bleed  2 easily.   Psychiatric/Behavioral: Positive for confusion. Negative for agitation, hallucinations and suicidal ideas.     Objective:     Vital Signs (Most Recent):  Temp: 98.5 °F (36.9 °C) (03/26/19 2054)  Pulse: 60 (03/26/19 2054)  Resp: 17 (03/26/19 2054)  BP: (!) 150/70 (03/26/19 2054)  SpO2: 100 % (03/26/19 2054) Vital Signs (24h Range):  Temp:  [98.2 °F (36.8 °C)-98.5 °F (36.9 °C)] 98.5 °F (36.9 °C)  Pulse:  [60-66] 60  Resp:  [17-18] 17  SpO2:  [99 %-100 %] 100 %  BP: (149-150)/(70) 150/70     Weight: 68 kg (150 lb)  Body mass index is 22.15 kg/m².    Physical Exam   Constitutional: He appears well-developed and well-nourished. No distress.   HENT:   Head: Normocephalic and atraumatic.   Eyes: Pupils are equal, round, and reactive to light.   Neck: Neck supple. Carotid bruit is not present. No thyromegaly present.   Cardiovascular: Normal rate and regular rhythm. Exam reveals no gallop.   No murmur heard.  Pulmonary/Chest: Effort normal and breath sounds normal. No respiratory distress. He has no wheezes.   Abdominal: Bowel sounds are normal. He exhibits no distension. There is no splenomegaly or hepatomegaly. There is no tenderness.   Musculoskeletal: Normal range of motion. He exhibits no edema.   Neurological: He is alert. No cranial nerve deficit or sensory deficit. GCS eye subscore is 4. GCS verbal subscore is 5. GCS motor subscore is 6.   Oriented to person, place, month (not year)   Skin: Skin is warm and dry. No rash noted.   Psychiatric: He has a normal mood and affect. His behavior is normal.         CRANIAL NERVES     CN III, IV, VI   Pupils are equal, round, and reactive to light.       Significant Labs:   CBC:   Recent Labs   Lab 03/26/19 1905 03/26/19  1913 03/27/19  0122   WBC 5.45  --   --    HGB 13.0*  --   --    HCT 41.6 39 38     --   --      CMP:   Recent Labs   Lab 03/26/19  1905      K 3.5      CO2 26   GLU 80   BUN 21   CREATININE 1.5*   CALCIUM 9.6   PROT 7.9    ALBUMIN 3.7   BILITOT 0.4   ALKPHOS 48*   AST 15   ALT 9*   ANIONGAP 11   EGFRNONAA 41.3*     Cardiac Markers:   Recent Labs   Lab 03/26/19 1905   BNP 63     Troponin:   Recent Labs   Lab 03/26/19 1905   TROPONINI 0.006     Urine Studies:   Recent Labs   Lab 03/26/19 1937   COLORU Yellow   APPEARANCEUA Clear   PHUR 5.0   SPECGRAV 1.015   PROTEINUA Negative   GLUCUA Negative   KETONESU Negative   BILIRUBINUA Negative   OCCULTUA Negative   NITRITE Negative   LEUKOCYTESUR Negative       Significant Imaging: I have reviewed all pertinent imaging results/findings within the past 24 hours.   none

## 2023-10-16 ENCOUNTER — LAB VISIT (OUTPATIENT)
Dept: LAB | Facility: HOSPITAL | Age: 88
End: 2023-10-16
Payer: MEDICARE

## 2023-10-16 ENCOUNTER — OFFICE VISIT (OUTPATIENT)
Dept: INTERNAL MEDICINE | Facility: CLINIC | Age: 88
End: 2023-10-16
Payer: MEDICARE

## 2023-10-16 VITALS
HEIGHT: 69 IN | BODY MASS INDEX: 16.98 KG/M2 | OXYGEN SATURATION: 99 % | WEIGHT: 114.63 LBS | SYSTOLIC BLOOD PRESSURE: 130 MMHG | DIASTOLIC BLOOD PRESSURE: 80 MMHG | HEART RATE: 74 BPM

## 2023-10-16 DIAGNOSIS — E78.2 MIXED HYPERLIPIDEMIA: Chronic | ICD-10-CM

## 2023-10-16 DIAGNOSIS — G60.9 IDIOPATHIC PERIPHERAL NEUROPATHY: Chronic | ICD-10-CM

## 2023-10-16 DIAGNOSIS — E78.5 HYPERLIPIDEMIA, UNSPECIFIED HYPERLIPIDEMIA TYPE: ICD-10-CM

## 2023-10-16 DIAGNOSIS — Z23 NEED FOR SHINGLES VACCINE: ICD-10-CM

## 2023-10-16 DIAGNOSIS — M53.86 SCIATICA ASSOCIATED WITH DISORDER OF LUMBAR SPINE: ICD-10-CM

## 2023-10-16 DIAGNOSIS — Z23 NEED FOR INFLUENZA VACCINATION: ICD-10-CM

## 2023-10-16 DIAGNOSIS — Z76.89 ENCOUNTER TO ESTABLISH CARE WITH NEW DOCTOR: ICD-10-CM

## 2023-10-16 DIAGNOSIS — R41.89 COGNITIVE IMPAIRMENT: ICD-10-CM

## 2023-10-16 DIAGNOSIS — I10 ESSENTIAL HYPERTENSION: Chronic | ICD-10-CM

## 2023-10-16 DIAGNOSIS — G31.84 MCI (MILD COGNITIVE IMPAIRMENT): Chronic | ICD-10-CM

## 2023-10-16 DIAGNOSIS — N40.0 BENIGN NON-NODULAR PROSTATIC HYPERPLASIA WITHOUT LOWER URINARY TRACT SYMPTOMS: ICD-10-CM

## 2023-10-16 DIAGNOSIS — G30.1 LATE ONSET ALZHEIMER'S DISEASE WITHOUT BEHAVIORAL DISTURBANCE: Primary | ICD-10-CM

## 2023-10-16 DIAGNOSIS — N40.0 BENIGN PROSTATIC HYPERPLASIA WITHOUT LOWER URINARY TRACT SYMPTOMS: Chronic | ICD-10-CM

## 2023-10-16 DIAGNOSIS — F02.80 LATE ONSET ALZHEIMER'S DISEASE WITHOUT BEHAVIORAL DISTURBANCE: Primary | ICD-10-CM

## 2023-10-16 DIAGNOSIS — I70.0 AORTIC ATHEROSCLEROSIS: Chronic | ICD-10-CM

## 2023-10-16 DIAGNOSIS — C61 PROSTATE CANCER: ICD-10-CM

## 2023-10-16 LAB
ALBUMIN SERPL BCP-MCNC: 3.7 G/DL (ref 3.5–5.2)
ALP SERPL-CCNC: 55 U/L (ref 55–135)
ALT SERPL W/O P-5'-P-CCNC: 6 U/L (ref 10–44)
ANION GAP SERPL CALC-SCNC: 13 MMOL/L (ref 8–16)
AST SERPL-CCNC: 18 U/L (ref 10–40)
BASOPHILS # BLD AUTO: 0.03 K/UL (ref 0–0.2)
BASOPHILS NFR BLD: 0.8 % (ref 0–1.9)
BILIRUB SERPL-MCNC: 0.5 MG/DL (ref 0.1–1)
BUN SERPL-MCNC: 9 MG/DL (ref 10–30)
CALCIUM SERPL-MCNC: 9.5 MG/DL (ref 8.7–10.5)
CHLORIDE SERPL-SCNC: 105 MMOL/L (ref 95–110)
CHOLEST SERPL-MCNC: 187 MG/DL (ref 120–199)
CHOLEST/HDLC SERPL: 3.8 {RATIO} (ref 2–5)
CO2 SERPL-SCNC: 25 MMOL/L (ref 23–29)
CREAT SERPL-MCNC: 0.9 MG/DL (ref 0.5–1.4)
DIFFERENTIAL METHOD: ABNORMAL
EOSINOPHIL # BLD AUTO: 0.1 K/UL (ref 0–0.5)
EOSINOPHIL NFR BLD: 2.6 % (ref 0–8)
ERYTHROCYTE [DISTWIDTH] IN BLOOD BY AUTOMATED COUNT: 14.1 % (ref 11.5–14.5)
EST. GFR  (NO RACE VARIABLE): >60 ML/MIN/1.73 M^2
GLUCOSE SERPL-MCNC: 64 MG/DL (ref 70–110)
HCT VFR BLD AUTO: 47.8 % (ref 40–54)
HDLC SERPL-MCNC: 49 MG/DL (ref 40–75)
HDLC SERPL: 26.2 % (ref 20–50)
HGB BLD-MCNC: 14.1 G/DL (ref 14–18)
IMM GRANULOCYTES # BLD AUTO: 0.01 K/UL (ref 0–0.04)
IMM GRANULOCYTES NFR BLD AUTO: 0.3 % (ref 0–0.5)
LDLC SERPL CALC-MCNC: 121.2 MG/DL (ref 63–159)
LYMPHOCYTES # BLD AUTO: 1.3 K/UL (ref 1–4.8)
LYMPHOCYTES NFR BLD: 33.8 % (ref 18–48)
MCH RBC QN AUTO: 27 PG (ref 27–31)
MCHC RBC AUTO-ENTMCNC: 29.5 G/DL (ref 32–36)
MCV RBC AUTO: 91 FL (ref 82–98)
MONOCYTES # BLD AUTO: 0.5 K/UL (ref 0.3–1)
MONOCYTES NFR BLD: 13.5 % (ref 4–15)
NEUTROPHILS # BLD AUTO: 1.9 K/UL (ref 1.8–7.7)
NEUTROPHILS NFR BLD: 49 % (ref 38–73)
NONHDLC SERPL-MCNC: 138 MG/DL
NRBC BLD-RTO: 0 /100 WBC
PLATELET # BLD AUTO: 183 K/UL (ref 150–450)
PMV BLD AUTO: 11.3 FL (ref 9.2–12.9)
POTASSIUM SERPL-SCNC: 3.7 MMOL/L (ref 3.5–5.1)
PROT SERPL-MCNC: 8.5 G/DL (ref 6–8.4)
RBC # BLD AUTO: 5.23 M/UL (ref 4.6–6.2)
SODIUM SERPL-SCNC: 143 MMOL/L (ref 136–145)
TRIGL SERPL-MCNC: 84 MG/DL (ref 30–150)
TSH SERPL DL<=0.005 MIU/L-ACNC: 0.94 UIU/ML (ref 0.4–4)
WBC # BLD AUTO: 3.79 K/UL (ref 3.9–12.7)

## 2023-10-16 PROCEDURE — 80053 COMPREHEN METABOLIC PANEL: CPT | Mod: HCNC | Performed by: NURSE PRACTITIONER

## 2023-10-16 PROCEDURE — 99999 PR PBB SHADOW E&M-EST. PATIENT-LVL III: ICD-10-PCS | Mod: PBBFAC,HCNC,, | Performed by: NURSE PRACTITIONER

## 2023-10-16 PROCEDURE — 99214 OFFICE O/P EST MOD 30 MIN: CPT | Mod: HCNC,S$GLB,, | Performed by: NURSE PRACTITIONER

## 2023-10-16 PROCEDURE — 1101F PR PT FALLS ASSESS DOC 0-1 FALLS W/OUT INJ PAST YR: ICD-10-PCS | Mod: HCNC,CPTII,S$GLB, | Performed by: NURSE PRACTITIONER

## 2023-10-16 PROCEDURE — 1159F PR MEDICATION LIST DOCUMENTED IN MEDICAL RECORD: ICD-10-PCS | Mod: HCNC,CPTII,S$GLB, | Performed by: NURSE PRACTITIONER

## 2023-10-16 PROCEDURE — 3288F FALL RISK ASSESSMENT DOCD: CPT | Mod: HCNC,CPTII,S$GLB, | Performed by: NURSE PRACTITIONER

## 2023-10-16 PROCEDURE — G0008 ADMIN INFLUENZA VIRUS VAC: HCPCS | Mod: HCNC,S$GLB,, | Performed by: NURSE PRACTITIONER

## 2023-10-16 PROCEDURE — 1101F PT FALLS ASSESS-DOCD LE1/YR: CPT | Mod: HCNC,CPTII,S$GLB, | Performed by: NURSE PRACTITIONER

## 2023-10-16 PROCEDURE — 1126F PR PAIN SEVERITY QUANTIFIED, NO PAIN PRESENT: ICD-10-PCS | Mod: HCNC,CPTII,S$GLB, | Performed by: NURSE PRACTITIONER

## 2023-10-16 PROCEDURE — 90694 VACC AIIV4 NO PRSRV 0.5ML IM: CPT | Mod: HCNC,S$GLB,, | Performed by: NURSE PRACTITIONER

## 2023-10-16 PROCEDURE — 99999 PR PBB SHADOW E&M-EST. PATIENT-LVL III: CPT | Mod: PBBFAC,HCNC,, | Performed by: NURSE PRACTITIONER

## 2023-10-16 PROCEDURE — 1159F MED LIST DOCD IN RCRD: CPT | Mod: HCNC,CPTII,S$GLB, | Performed by: NURSE PRACTITIONER

## 2023-10-16 PROCEDURE — 80061 LIPID PANEL: CPT | Mod: HCNC | Performed by: NURSE PRACTITIONER

## 2023-10-16 PROCEDURE — G0008 FLU VACCINE - QUADRIVALENT - ADJUVANTED: ICD-10-PCS | Mod: HCNC,S$GLB,, | Performed by: NURSE PRACTITIONER

## 2023-10-16 PROCEDURE — 1126F AMNT PAIN NOTED NONE PRSNT: CPT | Mod: HCNC,CPTII,S$GLB, | Performed by: NURSE PRACTITIONER

## 2023-10-16 PROCEDURE — 84443 ASSAY THYROID STIM HORMONE: CPT | Mod: HCNC | Performed by: NURSE PRACTITIONER

## 2023-10-16 PROCEDURE — 36415 COLL VENOUS BLD VENIPUNCTURE: CPT | Mod: HCNC | Performed by: NURSE PRACTITIONER

## 2023-10-16 PROCEDURE — 3288F PR FALLS RISK ASSESSMENT DOCUMENTED: ICD-10-PCS | Mod: HCNC,CPTII,S$GLB, | Performed by: NURSE PRACTITIONER

## 2023-10-16 PROCEDURE — 90694 FLU VACCINE - QUADRIVALENT - ADJUVANTED: ICD-10-PCS | Mod: HCNC,S$GLB,, | Performed by: NURSE PRACTITIONER

## 2023-10-16 PROCEDURE — 99214 PR OFFICE/OUTPT VISIT, EST, LEVL IV, 30-39 MIN: ICD-10-PCS | Mod: HCNC,S$GLB,, | Performed by: NURSE PRACTITIONER

## 2023-10-16 PROCEDURE — 85025 COMPLETE CBC W/AUTO DIFF WBC: CPT | Mod: HCNC | Performed by: NURSE PRACTITIONER

## 2023-10-16 RX ORDER — TAMSULOSIN HYDROCHLORIDE 0.4 MG/1
1 CAPSULE ORAL DAILY
Qty: 90 CAPSULE | Refills: 3 | Status: SHIPPED | OUTPATIENT
Start: 2023-10-16

## 2023-10-16 RX ORDER — TRIAMTERENE AND HYDROCHLOROTHIAZIDE 37.5; 25 MG/1; MG/1
1 CAPSULE ORAL DAILY
Qty: 90 CAPSULE | Refills: 3 | Status: SHIPPED | OUTPATIENT
Start: 2023-10-16

## 2023-10-16 RX ORDER — DONEPEZIL HYDROCHLORIDE 10 MG/1
10 TABLET, FILM COATED ORAL NIGHTLY
Qty: 90 TABLET | Refills: 3 | Status: SHIPPED | OUTPATIENT
Start: 2023-10-16

## 2023-10-16 RX ORDER — VERAPAMIL HYDROCHLORIDE 240 MG/1
240 TABLET, FILM COATED, EXTENDED RELEASE ORAL DAILY
Qty: 90 TABLET | Refills: 3 | Status: SHIPPED | OUTPATIENT
Start: 2023-10-16

## 2023-10-16 RX ORDER — GABAPENTIN 100 MG/1
100 CAPSULE ORAL DAILY
Qty: 90 CAPSULE | Refills: 3 | Status: SHIPPED | OUTPATIENT
Start: 2023-10-16

## 2023-10-16 RX ORDER — LOVASTATIN 20 MG/1
20 TABLET ORAL DAILY
Qty: 90 TABLET | Refills: 3 | Status: SHIPPED | OUTPATIENT
Start: 2023-10-16

## 2023-10-16 RX ORDER — FINASTERIDE 5 MG/1
5 TABLET, FILM COATED ORAL DAILY
Qty: 90 TABLET | Refills: 3 | Status: SHIPPED | OUTPATIENT
Start: 2023-10-16

## 2023-10-16 NOTE — PATIENT INSTRUCTIONS
Fasting lab orders, will call with results, if results ok, RTC in 1 yr for annual or sooner prn with one of MDs I work with who can be your new PCP: Dr. Leelee Mccurdy, Dr. Mike Cai, Dr. Frandy Pham, Dr.Melvin Mora, Dr. Gilberto Kiser    Flu and second Shingles vaccines discussed

## 2023-10-16 NOTE — PROGRESS NOTES
Subjective     Patient ID: Kevyn Leach is a 89 y.o. male.    Chief Complaint: Annual Exam    Former pt of Dr. Lindquist, here for annual wellness visit.     Last annual with me was 4-25-22    Present with wife and caretaker    He is fasting today    No new complaints today      Review of Systems   Constitutional:  Negative for activity change, appetite change, fatigue and unexpected weight change.   HENT:  Negative for hearing loss and voice change.    Eyes:  Negative for visual disturbance.   Respiratory:  Negative for apnea, cough, chest tightness and shortness of breath.    Cardiovascular:  Negative for chest pain, palpitations and leg swelling.   Gastrointestinal:  Negative for abdominal distention, abdominal pain, blood in stool, constipation, diarrhea, nausea and vomiting.   Endocrine: Negative for cold intolerance, heat intolerance, polydipsia, polyphagia and polyuria.   Genitourinary:  Negative for difficulty urinating, dysuria and penile pain.   Musculoskeletal:  Negative for arthralgias and myalgias.   Integumentary:  Negative for color change, pallor, rash and wound.   Allergic/Immunologic: Negative for environmental allergies, food allergies and frequent infections.   Neurological:  Negative for dizziness, syncope, weakness, light-headedness, numbness and headaches.   Hematological:  Negative for adenopathy. Does not bruise/bleed easily.   Psychiatric/Behavioral:  Negative for agitation, behavioral problems, sleep disturbance and suicidal ideas.      Review of patient's allergies indicates:  No Known Allergies    Current Outpatient Medications:     donepeziL (ARICEPT) 10 MG tablet, Take 1 tablet (10 mg total) by mouth every evening., Disp: 90 tablet, Rfl: 3    finasteride (PROSCAR) 5 mg tablet, Take 1 tablet (5 mg total) by mouth once daily., Disp: 90 tablet, Rfl: 3    gabapentin (NEURONTIN) 100 MG capsule, Take 1 capsule (100 mg total) by mouth once daily., Disp: 90 capsule, Rfl: 3    lovastatin  (MEVACOR) 20 MG tablet, Take 1 tablet (20 mg total) by mouth once daily., Disp: 90 tablet, Rfl: 3    tamsulosin (FLOMAX) 0.4 mg Cap, Take 1 capsule (0.4 mg total) by mouth once daily., Disp: 90 capsule, Rfl: 3    triamterene-hydrochlorothiazide 37.5-25 mg (DYAZIDE) 37.5-25 mg per capsule, Take 1 capsule by mouth once daily., Disp: 90 capsule, Rfl: 3    verapamiL (CALAN-SR) 240 MG CR tablet, Take 1 tablet (240 mg total) by mouth once daily., Disp: 90 tablet, Rfl: 3    Patient Active Problem List   Diagnosis    Benign prostatic hyperplasia    History of colon polyps    GERD with esophagitis    Hyperlipidemia    Intraoperative floppy iris syndrome (IFIS)    Essential hypertension    Aortic atherosclerosis    Lumbar stenosis    Cervical spine arthritis    Idiopathic peripheral neuropathy    MCI (mild cognitive impairment)    Prostate cancer    Late onset Alzheimer's disease without behavioral disturbance       Past Medical History:   Diagnosis Date    Amblyopia     refractive amblyopia left eye ()    Arthritis     BPH (benign prostatic hypertrophy)     Colon polyp     Encephalopathy acute 3/27/2019    Generalized pain     Hyperlipidemia     Hypertension     URI (upper respiratory infection)        Past Surgical History:   Procedure Laterality Date    CATARACT EXTRACTION W/  INTRAOCULAR LENS IMPLANT Right n/a    done at [facility] before     CATARACT EXTRACTION W/  INTRAOCULAR LENS IMPLANT Left 2016    OS     EYE SURGERY      PROSTATE SURGERY      TRANSURETHRAL RESECTION OF PROSTATE         Social History     Socioeconomic History    Marital status:    Tobacco Use    Smoking status: Former     Current packs/day: 0.00     Types: Cigarettes     Quit date: 1987     Years since quittin.0    Smokeless tobacco: Never   Substance and Sexual Activity    Alcohol use: No    Drug use: No    Sexual activity: Not Currently   Social History Narrative    Lives with wife; 3 kids; 10 grandkids;  5 great grandkids    +driving day and night     Social Determinants of Health     Financial Resource Strain: Low Risk  (5/21/2021)    Overall Financial Resource Strain (CARDIA)     Difficulty of Paying Living Expenses: Not hard at all   Food Insecurity: No Food Insecurity (5/21/2021)    Hunger Vital Sign     Worried About Running Out of Food in the Last Year: Never true     Ran Out of Food in the Last Year: Never true   Transportation Needs: No Transportation Needs (5/21/2021)    PRAPARE - Transportation     Lack of Transportation (Medical): No     Lack of Transportation (Non-Medical): No   Physical Activity: Insufficiently Active (5/21/2021)    Exercise Vital Sign     Days of Exercise per Week: 4 days     Minutes of Exercise per Session: 10 min   Stress: No Stress Concern Present (5/21/2021)    [employer] - Occupational Stress Questionnaire     Feeling of Stress : Not at all   Social Connections: Moderately Integrated (5/21/2021)    Social Connection and Isolation Panel [NHANES]     Frequency of Communication with Friends and Family: More than three times a week     Frequency of Social Gatherings with Friends and Family: Three times a week     Attends [employer] Services: 1 to 4 times per year     Active Member of Clubs or Organizations: No     Attends Club or Organization Meetings: Never     Marital Status:    Housing Stability: Low Risk  (11/30/2020)    Housing Stability Vital Sign     Unable to Pay for Housing in the Last Year: No     Number of Places Lived in the Last Year: 1     Unstable Housing in the Last Year: No       Family History   Problem Relation Age of Onset    Diabetes Father     Diabetes Paternal Aunt     Cancer Brother     No Known Problems Mother     Heart disease Neg Hx     Amblyopia Neg Hx     Blindness Neg Hx     Cataracts Neg Hx     Glaucoma Neg Hx     Hypertension Neg Hx     Macular degeneration Neg Hx     Retinal detachment Neg Hx     Strabismus Neg Hx   "   Stroke Neg Hx     Thyroid disease Neg Hx        Objective     Vitals:    10/16/23 1027 10/16/23 1043   BP: (!) 120/100 130/80   Pulse: 74    SpO2: 99%    Weight: 52 kg (114 lb 10.2 oz)    Height: 5' 9" (1.753 m)    PainSc: 0-No pain        Body mass index is 16.93 kg/m².    Physical Exam       Assessment and Plan     1. Late onset Alzheimer's disease without behavioral disturbance  On Aricept, stable    2. Idiopathic peripheral neuropathy  Stable on Gabapentin    3. MCI (mild cognitive impairment)  On Aricept, stable    4. Essential hypertension  -     CBC Auto Differential; Future; Expected date: 10/16/2023  -     Comprehensive Metabolic Panel; Future; Expected date: 10/16/2023  -     triamterene-hydrochlorothiazide 37.5-25 mg (DYAZIDE) 37.5-25 mg per capsule; Take 1 capsule by mouth once daily.  Dispense: 90 capsule; Refill: 3  -     verapamiL (CALAN-SR) 240 MG CR tablet; Take 1 tablet (240 mg total) by mouth once daily.  Dispense: 90 tablet; Refill: 3    Take medications as prescribed.    Monitor BP at home, goal BP < or = 140/80, call office if consistently above this range.    Follow low salt DASH diet and exercise.    BMI reviewed.    Go to ED if Headaches, blurred vision, chest pain, or SOB occurs along with elevated readings > or = 160/90.    5. Mixed hyperlipidemia  -     Lipid Panel; Future; Expected date: 10/16/2023  -     TSH; Future; Expected date: 10/16/2023    Continue cholesterol med, low fat diet, and exercise. Check lipids.    6. Aortic atherosclerosis  On statin    7. Prostate cancer  8. Benign prostatic hyperplasia without lower urinary tract symptoms  Followed by Urology    9. Need for influenza vaccination  Given today    10. Need for shingles vaccine  -     (In Office Administered) Zoster Recombinant Vaccine    11. Encounter to establish care with new doctor  Fasting lab orders, will call with results, if results ok, RTC in 1 yr for annual or sooner prn with one of MDs I work with who can " be your new PCP: Dr. Leelee Mccurdy, Dr. Mike Cai, Dr. Frandy Pham, Dr.Melvin Mora, Dr. Gilberto Kiser    12. Cognitive impairment  Comments:  better with Aricept-  will incr dose  Orders:  -     donepeziL (ARICEPT) 10 MG tablet; Take 1 tablet (10 mg total) by mouth every evening.  Dispense: 90 tablet; Refill: 3    13. Sciatica associated with disorder of lumbar spine  -     gabapentin (NEURONTIN) 100 MG capsule; Take 1 capsule (100 mg total) by mouth once daily.  Dispense: 90 capsule; Refill: 3    14. Hyperlipidemia, unspecified hyperlipidemia type  -     lovastatin (MEVACOR) 20 MG tablet; Take 1 tablet (20 mg total) by mouth once daily.  Dispense: 90 tablet; Refill: 3    15. Benign non-nodular prostatic hyperplasia without lower urinary tract symptoms  -     tamsulosin (FLOMAX) 0.4 mg Cap; Take 1 capsule (0.4 mg total) by mouth once daily.  Dispense: 90 capsule; Refill: 3    16. Adult BMI <19 kg/sq m  BMI reviewed    Other orders  -     finasteride (PROSCAR) 5 mg tablet; Take 1 tablet (5 mg total) by mouth once daily.  Dispense: 90 tablet; Refill: 3  -     Influenza (FLUAD) - Quadrivalent (Adjuvanted) *Preferred* (65+) (PF)    Fasting lab orders, will call with results, if results ok, RTC in 1 yr for annual or sooner prn with one of MDs I work with who can be your new PCP: Dr. Leelee Mccurdy, Dr. Mike Cai, Dr. Frandy Pham, Dr.Melvin Mora, Dr. Gilberto Kiser    Flu and second Shingles vaccines discussed    Follow up in about 1 year (around 10/16/2024) for annual or sooner as needed with one of MDs recommended on AVS.

## 2024-04-07 PROBLEM — R06.02 SHORTNESS OF BREATH: Status: ACTIVE | Noted: 2024-04-07

## 2024-04-07 PROBLEM — R60.9 DEPENDENT EDEMA: Status: ACTIVE | Noted: 2024-04-07

## 2024-04-07 PROBLEM — I50.9 ACUTE CONGESTIVE HEART FAILURE: Status: ACTIVE | Noted: 2024-04-07

## 2024-04-07 PROBLEM — M79.673 FOOT PAIN: Status: ACTIVE | Noted: 2024-04-07

## 2024-04-16 ENCOUNTER — OFFICE VISIT (OUTPATIENT)
Dept: INTERNAL MEDICINE | Facility: CLINIC | Age: 89
End: 2024-04-16
Payer: MEDICARE

## 2024-04-16 VITALS
DIASTOLIC BLOOD PRESSURE: 80 MMHG | SYSTOLIC BLOOD PRESSURE: 130 MMHG | HEIGHT: 69 IN | RESPIRATION RATE: 18 BRPM | HEART RATE: 88 BPM | BODY MASS INDEX: 15.95 KG/M2 | WEIGHT: 107.69 LBS | TEMPERATURE: 98 F

## 2024-04-16 DIAGNOSIS — R60.0 BILATERAL LOWER EXTREMITY EDEMA: ICD-10-CM

## 2024-04-16 DIAGNOSIS — G30.1 LATE ONSET ALZHEIMER'S DISEASE WITHOUT BEHAVIORAL DISTURBANCE: ICD-10-CM

## 2024-04-16 DIAGNOSIS — F02.80 LATE ONSET ALZHEIMER'S DISEASE WITHOUT BEHAVIORAL DISTURBANCE: ICD-10-CM

## 2024-04-16 DIAGNOSIS — I50.9 ACUTE CONGESTIVE HEART FAILURE, UNSPECIFIED HEART FAILURE TYPE: Primary | ICD-10-CM

## 2024-04-16 DIAGNOSIS — L97.521 ULCER OF LEFT FOOT, LIMITED TO BREAKDOWN OF SKIN: ICD-10-CM

## 2024-04-16 PROCEDURE — 1159F MED LIST DOCD IN RCRD: CPT | Mod: HCNC,CPTII,S$GLB, | Performed by: NURSE PRACTITIONER

## 2024-04-16 PROCEDURE — 1125F AMNT PAIN NOTED PAIN PRSNT: CPT | Mod: HCNC,CPTII,S$GLB, | Performed by: NURSE PRACTITIONER

## 2024-04-16 PROCEDURE — 99999 PR PBB SHADOW E&M-EST. PATIENT-LVL IV: CPT | Mod: PBBFAC,HCNC,, | Performed by: NURSE PRACTITIONER

## 2024-04-16 PROCEDURE — 1160F RVW MEDS BY RX/DR IN RCRD: CPT | Mod: HCNC,CPTII,S$GLB, | Performed by: NURSE PRACTITIONER

## 2024-04-16 PROCEDURE — 99214 OFFICE O/P EST MOD 30 MIN: CPT | Mod: HCNC,S$GLB,, | Performed by: NURSE PRACTITIONER

## 2024-04-16 RX ORDER — MUPIROCIN 20 MG/G
OINTMENT TOPICAL 3 TIMES DAILY
Qty: 30 G | Refills: 0 | Status: SHIPPED | OUTPATIENT
Start: 2024-04-16 | End: 2024-04-24

## 2024-04-16 RX ORDER — SULFAMETHOXAZOLE AND TRIMETHOPRIM 800; 160 MG/1; MG/1
1 TABLET ORAL 2 TIMES DAILY
Qty: 14 TABLET | Refills: 0 | Status: SHIPPED | OUTPATIENT
Start: 2024-04-16 | End: 2024-04-24

## 2024-04-16 NOTE — PROGRESS NOTES
"Subjective     Patient ID: Kevyn Leach is a 89 y.o. male.    Chief Complaint: Follow-up    Pt here for, "ER follow up and pressure ulcers"    Present with Daughter today Ventura Guerin. Granddaughter who is POA of pt on cell phone Kathy Lim.    I am familiar with pt previous PCP Dr. Lindquist    Still has swelling in both feet with ulcer on top of left foot. Was given lasix IV in ER. BNP was elevated at 866 in ER, CXR showed some fluid in lungs. Denies CP or SOB. Pt has dementia. He has no known previous hx of CHF when I have seen him in the past.     POA requesting Home Health orders. Pt will need to be established with a new PCP as his previous is . Will get him set up with Dr. Brantley practice partner    I have reviewed the ER visit from 24 and the plan of care below:    Medical Decision Making  89 year old male with b/l feet edema with weeping. Labs with bumped , otherwise unremarkable. No documented history of HF. Denies SOB, obtained CXR out of precaution. Imaging with mild patchy bilateral interstitial opacities. IV Lasix. Discussed at length findings and plan to daughter and grand daughter. Compression to feet for fluid control, neurovascularly intact. Adequate urinary output. Patient is in stable condition to be discharged home.  ER precautions given to patient who verbalized understanding.  Advised patient to follow-up with primary care provider and to call for an appointment.     Differential Dx:     Acute CHF  Cellulitis  Peripheral edema     Amount and/or Complexity of Data Reviewed  Labs: ordered.  Radiology: ordered.     Risk  Prescription drug management.      Clinical Impression:  Final diagnoses:  [M79.673] Foot pain  [R06.02] Shortness of breath  [I50.9] Acute congestive heart failure, unspecified heart failure type (Primary)  [R60.9] Dependent edema          ED Disposition Condition     Discharge Stable             ED Prescriptions    None         Follow-up Information  "        Follow up With Specialties Details Why Contact Info     Constance Chavez, KRISTYN Internal Medicine     [address]  [facility] [address]  [phone]        [facility] - Emergency Dept Emergency Medicine   If symptoms worsen [address] [place] [address] [phone]              Sonja Sun NP  04/07/24 7190     Review of Systems   Constitutional:  Negative for chills and fever.   Respiratory:  Negative for chest tightness and shortness of breath.    Cardiovascular:  Positive for leg swelling. Negative for chest pain.        As documented in HPI       Gastrointestinal:  Negative for abdominal pain, constipation, diarrhea, nausea and vomiting.   Musculoskeletal:  Negative for arthralgias, leg pain and myalgias.   Integumentary:  Positive for wound.        As documented in HPI     Allergic/Immunologic: Negative for environmental allergies, food allergies and frequent infections.   Neurological:  Negative for dizziness, weakness, light-headedness, numbness and headaches.   Hematological:  Negative for adenopathy. Does not bruise/bleed easily.   Psychiatric/Behavioral:  Negative for suicidal ideas.           Review of patient's allergies indicates:  No Known Allergies    Current Outpatient Medications:     donepeziL (ARICEPT) 10 MG tablet, Take 1 tablet (10 mg total) by mouth every evening., Disp: 90 tablet, Rfl: 3    finasteride (PROSCAR) 5 mg tablet, Take 1 tablet (5 mg total) by mouth once daily., Disp: 90 tablet, Rfl: 3    gabapentin (NEURONTIN) 100 MG capsule, Take 1 capsule (100 mg total) by mouth once daily., Disp: 90 capsule, Rfl: 3    lovastatin (MEVACOR) 20 MG tablet, Take 1 tablet (20 mg total) by mouth once daily., Disp: 90 tablet, Rfl: 3    tamsulosin (FLOMAX) 0.4 mg Cap, Take 1 capsule (0.4 mg total) by mouth once daily., Disp: 90 capsule, Rfl: 3    triamterene-hydrochlorothiazide 37.5-25 mg (DYAZIDE) 37.5-25 mg per capsule, Take 1 capsule by mouth once  daily., Disp: 90 capsule, Rfl: 3    verapamiL (CALAN-SR) 240 MG CR tablet, Take 1 tablet (240 mg total) by mouth once daily., Disp: 90 tablet, Rfl: 3    Patient Active Problem List   Diagnosis    Benign prostatic hyperplasia    History of colon polyps    GERD with esophagitis    Hyperlipidemia    Intraoperative floppy iris syndrome (IFIS)    Essential hypertension    Aortic atherosclerosis    Lumbar stenosis    Cervical spine arthritis    Idiopathic peripheral neuropathy    MCI (mild cognitive impairment)    Prostate cancer    Late onset Alzheimer's disease without behavioral disturbance    Acute congestive heart failure    Shortness of breath    Foot pain    Dependent edema    Dementia in other diseases classified elsewhere, unspecified severity, without behavioral disturbance, psychotic disturbance, mood disturbance, and anxiety       Past Medical History:   Diagnosis Date    Amblyopia     refractive amblyopia left eye ()    Arthritis     BPH (benign prostatic hypertrophy)     Colon polyp     Encephalopathy acute 3/27/2019    Generalized pain     Hyperlipidemia     Hypertension     URI (upper respiratory infection)        Past Surgical History:   Procedure Laterality Date    CATARACT EXTRACTION W/  INTRAOCULAR LENS IMPLANT Right n/a    done at [facility] before     CATARACT EXTRACTION W/  INTRAOCULAR LENS IMPLANT Left 2016    OS     EYE SURGERY      PROSTATE SURGERY      TRANSURETHRAL RESECTION OF PROSTATE         Social History     Socioeconomic History    Marital status:    Tobacco Use    Smoking status: Former     Current packs/day: 0.00     Types: Cigarettes     Quit date: 1987     Years since quittin.5    Smokeless tobacco: Never   Substance and Sexual Activity    Alcohol use: No    Drug use: No    Sexual activity: Not Currently   Social History Narrative    Lives with wife; 3 kids; 10 grandkids; 5 great grandkids    +driving day and night     Social Determinants of  Health     Financial Resource Strain: Low Risk  (5/21/2021)    Overall Financial Resource Strain (CARDIA)     Difficulty of Paying Living Expenses: Not hard at all   Food Insecurity: No Food Insecurity (5/21/2021)    Hunger Vital Sign     Worried About Running Out of Food in the Last Year: Never true     Ran Out of Food in the Last Year: Never true   Transportation Needs: No Transportation Needs (5/21/2021)    PRAPARE - Transportation     Lack of Transportation (Medical): No     Lack of Transportation (Non-Medical): No   Physical Activity: Insufficiently Active (5/21/2021)    Exercise Vital Sign     Days of Exercise per Week: 4 days     Minutes of Exercise per Session: 10 min   Stress: No Stress Concern Present (5/21/2021)    [employer] [place] [employer] - Occupational Stress Questionnaire     Feeling of Stress : Not at all   Social Connections: Moderately Integrated (5/21/2021)    Social Connection and Isolation Panel [NHANES]     Frequency of Communication with Friends and Family: More than three times a week     Frequency of Social Gatherings with Friends and Family: Three times a week     Attends Religion Services: 1 to 4 times per year     Active Member of Clubs or Organizations: No     Attends Club or Organization Meetings: Never     Marital Status:    Housing Stability: Low Risk  (11/30/2020)    Housing Stability Vital Sign     Unable to Pay for Housing in the Last Year: No     Number of Places Lived in the Last Year: 1     Unstable Housing in the Last Year: No       Family History   Problem Relation Name Age of Onset    Diabetes Father      Diabetes Paternal Aunt      Cancer Brother      No Known Problems Mother      Heart disease Neg Hx      Amblyopia Neg Hx      Blindness Neg Hx      Cataracts Neg Hx      Glaucoma Neg Hx      Hypertension Neg Hx      Macular degeneration Neg Hx      Retinal detachment Neg Hx      Strabismus Neg Hx      Stroke Neg Hx      Thyroid disease Neg Hx    "      Objective     Vitals:    04/16/24 1103   BP: 130/80   Pulse: 88   Resp: 18   Temp: 97.9 °F (36.6 °C)   Weight: 48.9 kg (107 lb 11.1 oz)   Height: 5' 9" (1.753 m)   PainSc:   1   PainLoc: Leg       Body mass index is 15.9 kg/m².    Physical Exam  Vitals and nursing note reviewed.   Constitutional:       Comments: underweight   HENT:      Head: Normocephalic.      Nose: Nose normal.      Mouth/Throat:      Mouth: Mucous membranes are moist.   Eyes:      Conjunctiva/sclera: Conjunctivae normal.   Cardiovascular:      Rate and Rhythm: Normal rate and regular rhythm.      Pulses: Normal pulses.           Dorsalis pedis pulses are 2+ on the right side and 2+ on the left side.        Posterior tibial pulses are 2+ on the right side and 2+ on the left side.      Heart sounds: Normal heart sounds. No murmur heard.     No gallop.   Pulmonary:      Effort: Pulmonary effort is normal.      Breath sounds: Normal breath sounds.   Musculoskeletal:      Cervical back: Normal range of motion.      Right lower leg: 3+ Pitting Edema present.      Left lower leg: 3+ Pitting Edema present.   Skin:     General: Skin is warm and dry.      Capillary Refill: Capillary refill takes 2 to 3 seconds.      Findings: Abrasion, erythema and wound present.             Comments: Skin tear on top of left foot with +3 pitting edema and some weeping noted, tender, some redness noted   Neurological:      Mental Status: He is alert. Mental status is at baseline.   Psychiatric:         Mood and Affect: Mood normal.         Behavior: Behavior normal.         Thought Content: Thought content normal.         Judgment: Judgment normal.            Assessment and Plan     1. Acute congestive heart failure, unspecified heart failure type  -     Ambulatory referral/consult to Cardiology; Future; Expected date: 04/16/2025    2. Bilateral lower extremity edema  -     Ambulatory referral/consult to Cardiology; Future; Expected date: 04/16/2025    3. Adult BMI " <19 kg/sq m  Bmi reviewed    4. Ulcer of left foot, limited to breakdown of skin  -     mupirocin (BACTROBAN) 2 % ointment; Apply topically 3 (three) times daily. for 7 days  Dispense: 30 g; Refill: 0  -     sulfamethoxazole-trimethoprim 800-160mg (BACTRIM DS) 800-160 mg Tab; Take 1 tablet by mouth 2 (two) times daily. for 7 days  Dispense: 14 tablet; Refill: 0    5. Late onset Alzheimer's disease without behavioral disturbance  Elevate extremity, do sit with legs prone long periods    Low salt diet    Make sure pt is taking his medicine, especially the Dyazide which is for fluid    Keep skin clean with antibacterial and soap    Compression socks or stockings over the counter as needed    Mupirocin ointment to wound 3 times a day for 7 days    Bactrim DS take twice a day for 7 days    Referral to Cardiology for follow up on congestive heart failure    Will set up follow up with Dr. Brantley my practice partner on 5/3 to transfer care and home health orders    Self care instructions provided in AVS         Follow up in about 2 weeks (around 4/30/2024) for with Dr. Suresh Brantley.

## 2024-04-16 NOTE — PATIENT INSTRUCTIONS
Elevate extremity, do sit with legs prone long periods    Low salt diet    Make sure pt is taking his medicine, especially the Dyazide which is for fluid    Keep skin clean with antibacterial and soap    Compression socks or stockings over the counter as needed    Mupirocin ointment to wound 3 times a day for 7 days    Bactrim DS take twice a day for 7 days    Referral to Cardiology for follow up on congestive heart failure    Will set up follow up with Dr. Brantley my practice partner on 5/3 to transfer care and home health orders

## 2024-04-18 ENCOUNTER — PATIENT MESSAGE (OUTPATIENT)
Dept: INTERNAL MEDICINE | Facility: CLINIC | Age: 89
End: 2024-04-18
Payer: MEDICARE

## 2024-04-24 ENCOUNTER — OFFICE VISIT (OUTPATIENT)
Dept: INTERNAL MEDICINE | Facility: CLINIC | Age: 89
End: 2024-04-24
Attending: FAMILY MEDICINE
Payer: MEDICARE

## 2024-04-24 VITALS
HEIGHT: 69 IN | SYSTOLIC BLOOD PRESSURE: 118 MMHG | WEIGHT: 118.19 LBS | BODY MASS INDEX: 17.51 KG/M2 | DIASTOLIC BLOOD PRESSURE: 60 MMHG

## 2024-04-24 DIAGNOSIS — I10 HYPERTENSION, ESSENTIAL: ICD-10-CM

## 2024-04-24 DIAGNOSIS — I50.9 ACUTE CONGESTIVE HEART FAILURE, UNSPECIFIED HEART FAILURE TYPE: Primary | ICD-10-CM

## 2024-04-24 DIAGNOSIS — E78.5 HYPERLIPIDEMIA, UNSPECIFIED HYPERLIPIDEMIA TYPE: ICD-10-CM

## 2024-04-24 DIAGNOSIS — F02.80 DEMENTIA IN OTHER DISEASES CLASSIFIED ELSEWHERE, UNSPECIFIED SEVERITY, WITHOUT BEHAVIORAL DISTURBANCE, PSYCHOTIC DISTURBANCE, MOOD DISTURBANCE, AND ANXIETY: ICD-10-CM

## 2024-04-24 DIAGNOSIS — R60.0 BILATERAL LOWER EXTREMITY EDEMA: ICD-10-CM

## 2024-04-24 DIAGNOSIS — C61 PROSTATE CANCER: ICD-10-CM

## 2024-04-24 DIAGNOSIS — N40.0 BENIGN PROSTATIC HYPERPLASIA WITHOUT LOWER URINARY TRACT SYMPTOMS: Chronic | ICD-10-CM

## 2024-04-24 DIAGNOSIS — I70.0 AORTIC ATHEROSCLEROSIS: ICD-10-CM

## 2024-04-24 DIAGNOSIS — L97.521 ULCER OF LEFT FOOT, LIMITED TO BREAKDOWN OF SKIN: ICD-10-CM

## 2024-04-24 PROBLEM — M79.673 FOOT PAIN: Status: RESOLVED | Noted: 2024-04-07 | Resolved: 2024-04-24

## 2024-04-24 PROCEDURE — 1159F MED LIST DOCD IN RCRD: CPT | Mod: HCNC,CPTII,S$GLB, | Performed by: FAMILY MEDICINE

## 2024-04-24 PROCEDURE — 3288F FALL RISK ASSESSMENT DOCD: CPT | Mod: HCNC,CPTII,S$GLB, | Performed by: FAMILY MEDICINE

## 2024-04-24 PROCEDURE — 1101F PT FALLS ASSESS-DOCD LE1/YR: CPT | Mod: HCNC,CPTII,S$GLB, | Performed by: FAMILY MEDICINE

## 2024-04-24 PROCEDURE — 1160F RVW MEDS BY RX/DR IN RCRD: CPT | Mod: HCNC,CPTII,S$GLB, | Performed by: FAMILY MEDICINE

## 2024-04-24 PROCEDURE — 1126F AMNT PAIN NOTED NONE PRSNT: CPT | Mod: HCNC,CPTII,S$GLB, | Performed by: FAMILY MEDICINE

## 2024-04-24 PROCEDURE — 99999 PR PBB SHADOW E&M-EST. PATIENT-LVL V: CPT | Mod: PBBFAC,HCNC,, | Performed by: FAMILY MEDICINE

## 2024-04-24 PROCEDURE — 99214 OFFICE O/P EST MOD 30 MIN: CPT | Mod: HCNC,S$GLB,, | Performed by: FAMILY MEDICINE

## 2024-04-24 NOTE — PROGRESS NOTES
Subjective:       Patient ID: Kevyn Leach is a 89 y.o. male.    Chief Complaint: Follow-up    Established patient follows up for management of chronic medical illnesses with complaints today. Please see dictation and ROS for interval problems, specific complaints and disease management discussion.    Past, Surgical, Family, Social, Histories; Medications, allergies reviewed and reconciled.  Health maintenance file reviewed and addressed items due. Recent applicable lab, imaging and cardiovascular results reviewed.  Problem list items reviewed and modified or added entries (in the overview section) may not be transcribed into this encounter note due to note writer format.      Accompanied by daughter Shahab Jonas, who provides history.  Patient is lethargic at home.  They canceled his appointment with Cardiology.  This was follow-up for April 7th emergency room visit.  Had a sore to the top of his foot.  Subsequently saw ANDRE, placed on antibiotics and that is much better.  She is interesting in putting him in [facility] nursing home.  They have requested that they liquid 8 property however, posing some difficulty to family concerning the finances and extreme cost of all of this.    Patient is alert today answers questions.  New to me, seen by our nurse practitioner staff.        Review of Systems   Unable to perform ROS: Dementia   Constitutional:  Positive for fatigue.   Respiratory:  Positive for shortness of breath.    Cardiovascular:  Positive for leg swelling.   Genitourinary:  Positive for difficulty urinating.   Skin:  Positive for wound.   Neurological:  Positive for weakness.   Psychiatric/Behavioral:  Positive for confusion.        Objective:      Physical Exam  Vitals and nursing note reviewed.   Constitutional:       Appearance: He is well-developed. He is not diaphoretic.   HENT:      Head: Normocephalic and atraumatic.   Eyes:      General: No scleral icterus.     Conjunctiva/sclera:  Conjunctivae normal.   Cardiovascular:      Rate and Rhythm: Normal rate and regular rhythm.      Heart sounds: Heart sounds are distant. No murmur heard.     No friction rub. No gallop.   Pulmonary:      Effort: Pulmonary effort is normal. No respiratory distress.      Breath sounds: Decreased breath sounds present. No wheezing or rales.   Abdominal:      General: There is no distension.      Tenderness: There is no abdominal tenderness.   Musculoskeletal:         General: No deformity.      Cervical back: Normal range of motion and neck supple.   Skin:     General: Skin is warm and dry.      Findings: No erythema or rash.          Neurological:      Mental Status: He is alert.      GCS: GCS eye subscore is 4. GCS verbal subscore is 5. GCS motor subscore is 6.      Cranial Nerves: No cranial nerve deficit.      Motor: No tremor.      Coordination: Coordination normal.      Gait: Gait normal.   Psychiatric:         Behavior: Behavior normal.         Thought Content: Thought content normal.         Judgment: Judgment normal.         Assessment:       1. Acute congestive heart failure, unspecified heart failure type    2. Bilateral lower extremity edema    3. Benign prostatic hyperplasia without lower urinary tract symptoms    4. Dementia in other diseases classified elsewhere, unspecified severity, without behavioral disturbance, psychotic disturbance, mood disturbance, and anxiety    5. Ulcer of left foot, limited to breakdown of skin    6. Prostate cancer    7. Aortic atherosclerosis    8. Hypertension, essential    9. Hyperlipidemia, unspecified hyperlipidemia type        Plan:     Medication List with Changes/Refills   Current Medications    DONEPEZIL (ARICEPT) 10 MG TABLET    Take 1 tablet (10 mg total) by mouth every evening.    FINASTERIDE (PROSCAR) 5 MG TABLET    Take 1 tablet (5 mg total) by mouth once daily.    GABAPENTIN (NEURONTIN) 100 MG CAPSULE    Take 1 capsule (100 mg total) by mouth once daily.     LOVASTATIN (MEVACOR) 20 MG TABLET    Take 1 tablet (20 mg total) by mouth once daily.    MUPIROCIN (BACTROBAN) 2 % OINTMENT    Apply topically 3 (three) times daily. for 7 days    SULFAMETHOXAZOLE-TRIMETHOPRIM 800-160MG (BACTRIM DS) 800-160 MG TAB    Take 1 tablet by mouth 2 (two) times daily. for 7 days    TAMSULOSIN (FLOMAX) 0.4 MG CAP    Take 1 capsule (0.4 mg total) by mouth once daily.    TRIAMTERENE-HYDROCHLOROTHIAZIDE 37.5-25 MG (DYAZIDE) 37.5-25 MG PER CAPSULE    Take 1 capsule by mouth once daily.    VERAPAMIL (CALAN-SR) 240 MG CR TABLET    Take 1 tablet (240 mg total) by mouth once daily.     1. Acute congestive heart failure, unspecified heart failure type  -     Ambulatory referral/consult to Cardiology; Future; Expected date: 05/01/2024  -     Ambulatory referral/consult to Outpatient Case Management  -     Ambulatory referral/consult to [facility]    2. Bilateral lower extremity edema  Comments:  better, continue Dyazide    3. Benign prostatic hyperplasia without lower urinary tract symptoms  -     Ambulatory referral/consult to Urology; Future; Expected date: 05/01/2024  -     Ambulatory referral/consult to [facility]    4. Dementia in other diseases classified elsewhere, unspecified severity, without behavioral disturbance, psychotic disturbance, mood disturbance, and anxiety  Overview:  Noted by KISHAN MACK NP  last documented on [id number]    Orders:  -     Ambulatory referral/consult to Outpatient Case Management  -     Ambulatory referral/consult to [facility]    5. Ulcer of left foot, limited to breakdown of skin  Comments:  on bactrim per ANDRE Chavez, much improved per daughter    6. Prostate cancer  Assessment & Plan:  -Unconfirmed diagnosis, last PSA 4.5 2014  -8/9/2020 CT CAP suggests prostate neoplasm      7. Aortic atherosclerosis  Overview:  -example CT Uro 11/12/2022, on statin      8. Hypertension, essential  Assessment &  Plan:  -as first visit to me, meds noted dyazide, verapamil, lovastatin      9. Hyperlipidemia, unspecified hyperlipidemia type      See meds, orders, follow up, routing and instructions sections of encounter and AVS. Discussed with patient and provided on AVS.    Lab Results   Component Value Date     04/07/2024    K 3.8 04/07/2024     04/07/2024    BUN 11 04/07/2024    CREATININE 0.9 04/07/2024    GLU 79 04/07/2024    HGBA1C 5.5 03/27/2019    MG 1.8 07/09/2019    AST 19 04/07/2024    ALT 8 (L) 04/07/2024    ALBUMIN 3.5 04/07/2024    PROT 8.1 04/07/2024    BILITOT 0.3 04/07/2024    CHOL 187 10/16/2023    HDL 49 10/16/2023    LDLCALC 121.2 10/16/2023    TRIG 84 10/16/2023    WBC 4.49 04/07/2024    HGB 13.0 (L) 04/07/2024    HCT 42.9 04/07/2024    HCT 38 03/27/2019     04/07/2024    PSA 4.5 (H) 05/15/2014    TSH 0.943 10/16/2023     (H) 04/07/2024    URICACID 3.7 04/07/2024

## 2024-04-26 ENCOUNTER — OUTPATIENT CASE MANAGEMENT (OUTPATIENT)
Dept: ADMINISTRATIVE | Facility: OTHER | Age: 89
End: 2024-04-26
Payer: MEDICARE

## 2024-04-26 NOTE — PROGRESS NOTES
Outpatient Care Management  Initial Patient Assessment    Patient: Kevyn Leach  MRN: [id number]  Date of Service: 04/26/2024  Completed by: Lesia Mckenna RN  Referral Date: 04/24/2024  Date of Eligibility: 4/25/2024  Program:   High Risk  Status: Ongoing  Effective Dates: 4/26/2024 - present  Responsible Staff: Lesia Mckenna RN        Reason for Visit   Patient presents with    OPCM Chart Review     4/26/24    Nursing Assessment     4/26/24    OPCM Enrollment Call     4/26/24       Brief Summary:  Kevyn Leach was referred by Dr. Brantley for Heart failure, Dementia, and Benign  prostatic hyperplasia. Patient qualifies for program based on risk score of 61%.   Active problem list, medical, surgical and social history reviewed. Active comorbidities include Htn, Hyperlipidemia, Heart Failure, and Alzheimer's . Areas of need identified by patient caregiver include help managing health and assistance with in home sitters or adult day care. Referral sent to OPCM .   Next steps: Follow up with patient caregiver on or around 5/3/24.    Disability Status  Is the patient alert and oriented (person, place, time, and situation)?: Disoriented to Situation; Disoriented to Time (can identify family and place)  Visual Difficulty or Blind: yes  Visual and Hearing Needs Conclusion: wears eyeglasses all of the time  Vision Management: Other (wears eyeglasses all of the time)  Difficulty Concentrating, Remembering or Making Decisions: yes  Concentration Management: Family assists when needed  Communication Difficulty: no  Eating/Swallowing Difficulty: no  Walking or Climbing Stairs Difficulty: yes  Walking or Climbing Stairs: ambulation difficulty, requires equipment  Mobility Management: uses walker  Dressing/Bathing Difficulty: yes  Dressing/Bathing: bathing difficulty, requires equipment; bathing difficulty, assistance 1 person  Dressing/Bathing Management: has shower chair; daughter gives him  baths  Toileting : Independent  Continence : Incontience - Bladder (wears adult diapers)  Difficulty Managing Errands Independently: yes  Errands Management: Daughter assists and takes patient to appointments  Equipment Currently Used at Home: shower chair; rollator  ADL Conclusion Statement: Patient can ambulate with rollator. Is able to feed himself. Can use toilet. Needs assistance with other ADLs.  Service Animal Required: no  Change in Functional Status Since Onset of Current Illness/Injury: yes (Patient has become more tired and does not want to do much.)        Spiritual Beliefs  Spiritual, Cultural Beliefs, Sikhism Practices, Values that Affect Care: no      Social History     Socioeconomic History    Marital status:    Tobacco Use    Smoking status: Former     Current packs/day: 0.00     Types: Cigarettes     Quit date: 1987     Years since quittin.6    Smokeless tobacco: Never   Substance and Sexual Activity    Alcohol use: No    Drug use: No    Sexual activity: Not Currently   Social History Narrative    Lives with wife; 3 kids; 10 grandkids; 5 great grandkids    +driving day and night     Social Determinants of Health     Financial Resource Strain: Low Risk  (2024)    Overall Financial Resource Strain (CARDIA)     Difficulty of Paying Living Expenses: Not hard at all   Food Insecurity: No Food Insecurity (2024)    Hunger Vital Sign     Worried About Running Out of Food in the Last Year: Never true     Ran Out of Food in the Last Year: Never true   Transportation Needs: No Transportation Needs (2024)    PRAPARE - Transportation     Lack of Transportation (Medical): No     Lack of Transportation (Non-Medical): No   Physical Activity: Inactive (2024)    Exercise Vital Sign     Days of Exercise per Week: 0 days     Minutes of Exercise per Session: 0 min   Stress: No Stress Concern Present (2024)    [place] [employer] - Occupational Stress  Questionnaire     Feeling of Stress : Only a little   Social Connections: Moderately Integrated (4/26/2024)    Social Connection and Isolation Panel [NHANES]     Frequency of Communication with Friends and Family: More than three times a week     Frequency of Social Gatherings with Friends and Family: More than three times a week     Attends Latter-day Services: 1 to 4 times per year     Active Member of Clubs or Organizations: No     Attends Club or Organization Meetings: Never     Marital Status:    Housing Stability: Low Risk  (4/26/2024)    Housing Stability Vital Sign     Unable to Pay for Housing in the Last Year: No     Number of Times Moved in the Last Year: 1     Homeless in the Last Year: No       Roles and Relationships  Primary Source of Support/Comfort: child(annel)  Name of Support/Comfort Primary Source: Ventura Jonas daughter  Primary Roles/Responsibilities: retired  Secondary Source of Support/Comfort: spouse  Name of Support/Comfort Secondary Source: Karime Leach      Advance Directives (For Healthcare)  Advance Directive  (If Adv Dir status is received, view document under Adv Dir in header or Chart Review Media tab): Patient has advance directive, copy not received.  Patient Requests Assistance: Patient will do independently        Patient Reported Insurance  Verified current insurance plan:: [employer] Advantage  [employer] benefits discussed:: Mail Order Pharmacy            4/26/2024    12:27 PM 4/24/2024    10:43 AM 10/16/2023    10:29 AM 5/21/2021     9:23 AM 1/31/2019    10:09 AM 6/22/2018     2:05 PM 7/31/2017    11:28 AM   Depression Patient Health Questionnaire   Over the last two weeks how often have you been bothered by little interest or pleasure in doing things Several days Not at all Not at all Not at all Several days Not at all Not at all   Over the last two weeks how often have you been bothered by feeling down, depressed or hopeless Several days Not at all Not at all Not at  all Several days Not at all Not at all   PHQ-2 Total Score 2 0 0 0 2 0 0       Learning Assessment       04/26/2024 1253 [facility] (4/26/2024 - Present)   Created by Lesia Mckenna, RN -  (Nurse) Status: Complete                 PRIMARY LEARNER     Primary Learner Name:  Kevyn Leach BB - 04/26/2024 1253    Relationship:  Patient BB - 04/26/2024 1253    Does the primary learner have any barriers to learning?:  Cognitive BB - 04/26/2024 1253    Comment: Dementia/Alzheimers     What is the preferred language of the primary learner?:  English BB - 04/26/2024 1253    Is an  required?:  No BB - 04/26/2024 1253    How does the primary learner prefer to learn new concepts?:  Listening, Demonstration, Pictures/Video BB - 04/26/2024 1253    How often do you need to have someone help you read instructions, pamphlets, or written material from your doctor or pharmacy?:  Often BB - 04/26/2024 1253        CO-LEARNER #1     Co-Learner Name (if applicable):  Ventura Jonas BB - 04/26/2024 1253    Relationship:  Family BB - 04/26/2024 1253    Does the co-learner have any barriers to learning?:  No Barriers BB - 04/26/2024 1253    What is the preferred language of the co-learner?:  English BB - 04/26/2024 1253    Is an  required?:  No BB - 04/26/2024 1253    How does the co-learner prefer to learn new concepts?:  Listening, Reading BB - 04/26/2024 1253        CO-LEARNER #2     No question answered        SPECIAL TOPICS     No question answered        ANSWERED BY:     No question answered        Comments         Edit History       Lesia Mckenna, RN -  (Nurse)   04/26/2024 1253

## 2024-04-26 NOTE — LETTER
Kevyn Hany  [address] [address]    Dear Mr. Kevyn Leach,     Welcome to [facility] Outpatient Care Management Program. We are here to assist patients with multiple long-term (chronic) conditions who often need more personalized healthcare.    It was a pleasure talking with your daughter today. My name is Lesia Mckenna. I look forward to working with you as your Nurse Care Manager. I will be contacting you by telephone routinely to help coordinate care and resolve issues.    My goal is to help you function at the healthiest and highest level possible. You can contact me directly at [phone].    As an [facility] patient with [employer], some of the services we provide, at no cost to you, include:     Development of an individualized care plan with a Registered Nurse   Connection with a   Assistance from a Community Health Worker  Connection with available resources and services    Coordinate communication among your care team members   Provide coaching and education  Help you understand your doctor's treatment plan  Help you obtain information about your insurance coverage.    All services provided by [facility]s Outpatient Care Managers and other care team members are coordinated with and communicated to your primary care team.      As part of your enrollment, you will be receiving education materials and more information about these services in your [facility] account, by phone, or through the mail. If you do not wish to participate or receive information, you can Opt Out by contacting our office at [phone].      Kind Regards,        Lesia Mckenna RN  [facility]   Outpatient Care Management

## 2024-04-29 ENCOUNTER — TELEPHONE (OUTPATIENT)
Dept: INTERNAL MEDICINE | Facility: CLINIC | Age: 89
End: 2024-04-29
Payer: MEDICARE

## 2024-04-29 NOTE — TELEPHONE ENCOUNTER
----- Message from Marion Mcguire sent at 4/29/2024 11:22 AM CDT -----  Regarding: Care @ Home (NP Home Visit)  Thank your referral to [facility]. Multiple attempts to reach the patent have been unsuccessful. Please advise the patient to call and schedule at [phone] option 1 or schedule an in-clinic visit.

## 2024-05-03 ENCOUNTER — OUTPATIENT CASE MANAGEMENT (OUTPATIENT)
Dept: ADMINISTRATIVE | Facility: OTHER | Age: 89
End: 2024-05-03
Payer: MEDICARE

## 2024-05-06 NOTE — PROGRESS NOTES
"Outpatient Care Management   - High Risk Patient Assessment    Patient: Kevyn Leach  MRN:  [id number]  Date of Service:  5/6/2024  Completed by:  Mercedes Cooper LMSW  Referral Date: 04/24/2024    Reason for Visit   Patient presents with    Social Work Assessment     5/6/2024       Brief Summary:  received a referral from OPCM MURTAZA Mckenna for the following patient identified psycho-social needs: sitter services, adult day care and incontinence supplies.  DAISY completed social assessment with pt's daughter, Ventura, via telephone.  Pt dx with dementia. Pt's wife, Karime, also has dementia. Pt and Karime live alone. Ventura stated pt is "in and out" and urinating on himself. Pt required assistance with bathing and dressing. Ventura is very involved with pt's care. Their family friend, Dorota, checks on them daily in the evenings and helps with housekeeping. Pt has become noncompliant with wanting to take his medications. Pt does not drive anymore and Ventura brings him to his MD appmnts and the grocery store. 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 Pt is currently receiving Meals on Wheels. Ventura's daughter helps manage pt's finances and pays their bills for pt and his wife. Ventura reported they have an appmt Thursday regarding [facility] and their adult day care program. DAISY encouraged her to inquire if pt would need to switch provider to sign up for [facility]. DAISY will send portal resources for sitter services, along with adult day cares. Ventura also open to referral being sent to [facility] for additional support. Daisy will follow up in 1 week. Care plan was created in collaboration with patient/caregiver input.  completed the SDOH questionnaire.    "

## 2024-05-07 ENCOUNTER — OUTPATIENT CASE MANAGEMENT (OUTPATIENT)
Dept: ADMINISTRATIVE | Facility: OTHER | Age: 89
End: 2024-05-07
Payer: MEDICARE

## 2024-05-07 NOTE — PROGRESS NOTES
PCP - Suresh Brantley MD  Referring Physician:     Subjective:   Patient ID:  Kevyn Leach is a 89 y.o. male with past medical history of:   Alzheimer's  Hypertension  Hyperlipidemia  BPH  arthritis     Here for new patient evaluation. Recent ER visit for foot wound but also found to have elevated BNP and swollen legs/ankles. Today, the patient is accompanied to the visit by his daughter. She states that he has lost 20 lbs over the past 2-3 weeks and that he does not eat meals or walk around at home. He is being taken care of by his wife who is also around age 90. She states that the patient has stopped taking his medications because he no longer wants to take them. She has tried to get him into a nursing home  before but there have been financial/social barriers related to the selling of some of his properties.     EKG in clinic today shows normal sinus rhythm with LVH and frequent PVCs    History:     Social History     Tobacco Use    Smoking status: Former     Current packs/day: 0.00     Types: Cigarettes     Quit date: 1987     Years since quittin.6    Smokeless tobacco: Never   Substance Use Topics    Alcohol use: No     Family History   Problem Relation Name Age of Onset    Diabetes Father      Diabetes Paternal Aunt      Cancer Brother      No Known Problems Mother      Heart disease Neg Hx      Amblyopia Neg Hx      Blindness Neg Hx      Cataracts Neg Hx      Glaucoma Neg Hx      Hypertension Neg Hx      Macular degeneration Neg Hx      Retinal detachment Neg Hx      Strabismus Neg Hx      Stroke Neg Hx      Thyroid disease Neg Hx         Meds:   Review of patient's allergies indicates:  No Known Allergies    Current Outpatient Medications:     donepeziL (ARICEPT) 10 MG tablet, Take 1 tablet (10 mg total) by mouth every evening., Disp: 90 tablet, Rfl: 3    finasteride (PROSCAR) 5 mg tablet, Take 1 tablet (5 mg total) by mouth once daily., Disp: 90 tablet, Rfl: 3    gabapentin  "(NEURONTIN) 100 MG capsule, Take 1 capsule (100 mg total) by mouth once daily., Disp: 90 capsule, Rfl: 3    lovastatin (MEVACOR) 20 MG tablet, Take 1 tablet (20 mg total) by mouth once daily., Disp: 90 tablet, Rfl: 3    tamsulosin (FLOMAX) 0.4 mg Cap, Take 1 capsule (0.4 mg total) by mouth once daily., Disp: 90 capsule, Rfl: 3    triamterene-hydrochlorothiazide 37.5-25 mg (DYAZIDE) 37.5-25 mg per capsule, Take 1 capsule by mouth once daily., Disp: 90 capsule, Rfl: 3    verapamiL (CALAN-SR) 240 MG CR tablet, Take 1 tablet (240 mg total) by mouth once daily., Disp: 90 tablet, Rfl: 3      Objective:   /69   Pulse (!) 180   Ht 5' 9" (1.753 m)   Wt 48.2 kg (106 lb 4.2 oz)   SpO2 (!) 90%   BMI 15.69 kg/m²     Physical Exam  Gen: ill-appearing, cachectic  HEENT: Pupils equal and reactive to light  Cardio: Regular rate, point of maximal impulse not displaced, no murmur noted, 2+ radial pulses bilaterally, 2+ DP pulses bilaterally  Resp: CTAB, no wheezing  Abd: Soft, non-tender, non-distended  Skin: Warm, dry, no peripheral edema noted  Neuro: Forgetful with even brief conversation        Labs:     Lab Results   Component Value Date     04/07/2024    K 3.8 04/07/2024     04/07/2024    CO2 28 04/07/2024    BUN 11 04/07/2024    CREATININE 0.9 04/07/2024    ANIONGAP 10 04/07/2024     Lab Results   Component Value Date    HGBA1C 5.5 03/27/2019     Lab Results   Component Value Date     (H) 04/07/2024     (H) 11/13/2023    BNP 91 05/22/2019       Lab Results   Component Value Date    WBC 4.49 04/07/2024    HGB 13.0 (L) 04/07/2024    HCT 42.9 04/07/2024    HCT 38 03/27/2019     04/07/2024    GRAN 2.5 04/07/2024    GRAN 55.5 04/07/2024     Lab Results   Component Value Date    CHOL 187 10/16/2023    HDL 49 10/16/2023    LDLCALC 121.2 10/16/2023    TRIG 84 10/16/2023       Lab Results   Component Value Date     04/07/2024    K 3.8 04/07/2024     04/07/2024    CO2 28 04/07/2024 " "   BUN 11 04/07/2024    CREATININE 0.9 04/07/2024    ANIONGAP 10 04/07/2024     Lab Results   Component Value Date    HGBA1C 5.5 03/27/2019     Lab Results   Component Value Date     (H) 04/07/2024     (H) 11/13/2023    BNP 91 05/22/2019    Lab Results   Component Value Date    WBC 4.49 04/07/2024    HGB 13.0 (L) 04/07/2024    HCT 42.9 04/07/2024    HCT 38 03/27/2019     04/07/2024    GRAN 2.5 04/07/2024    GRAN 55.5 04/07/2024     Lab Results   Component Value Date    CHOL 187 10/16/2023    HDL 49 10/16/2023    LDLCALC 121.2 10/16/2023    TRIG 84 10/16/2023                Cardiovascular Imaging:     Echo: No results found for: "EF"    Stress test:     LHC:    Assessment & Plan:     Failure to thrive  -Patient to be sent to the ER for failure to thrive (losing weight, not eating, not taking meds, etc...)  -Recommend consult to Palliative care    2. Heart failure  -Recommend echocardiogram to further evaluate if hospice route is not chosen, will order today  -Discontinue verapamil    Case staffed with Dr. Pastor. RTC on discharge if hospice not chosen after ER visit/admission.    Signed:  Jose Cadena MD  [facility] PGY-6    Staff attestation to follow.      "

## 2024-05-07 NOTE — PROGRESS NOTES
Outpatient Care Management  Plan of Care Follow Up Visit    Patient: Kevyn Leach  MRN: [id number]  Date of Service: 05/07/2024  Completed by: Lesia Mckenna RN  Referral Date: 04/24/2024    Reason for Visit   Patient presents with    OPCM Chart Review     5/7/24    OPCM RN Follow Up Call     5/7/24       Brief Summary:     OPCM follow up complete. Continued education on CHF. Daughter/caregiver is in agreement with follow up call on or around 5/14/24.

## 2024-05-08 ENCOUNTER — OFFICE VISIT (OUTPATIENT)
Dept: CARDIOLOGY | Facility: CLINIC | Age: 89
End: 2024-05-08
Payer: MEDICARE

## 2024-05-08 ENCOUNTER — HOSPITAL ENCOUNTER (EMERGENCY)
Facility: HOSPITAL | Age: 89
Discharge: HOME | End: 2024-05-09
Attending: EMERGENCY MEDICINE
Payer: MEDICARE

## 2024-05-08 VITALS
OXYGEN SATURATION: 90 % | SYSTOLIC BLOOD PRESSURE: 124 MMHG | WEIGHT: 106.25 LBS | BODY MASS INDEX: 15.74 KG/M2 | DIASTOLIC BLOOD PRESSURE: 69 MMHG | HEART RATE: 180 BPM | HEIGHT: 69 IN

## 2024-05-08 DIAGNOSIS — I50.9 ACUTE CONGESTIVE HEART FAILURE, UNSPECIFIED HEART FAILURE TYPE: ICD-10-CM

## 2024-05-08 DIAGNOSIS — G30.9 SEVERE ALZHEIMER'S DEMENTIA, UNSPECIFIED TIMING OF DEMENTIA ONSET, UNSPECIFIED WHETHER BEHAVIORAL, PSYCHOTIC, OR MOOD DISTURBANCE OR ANXIETY: Primary | ICD-10-CM

## 2024-05-08 DIAGNOSIS — Z51.5 ENCOUNTER FOR HOSPICE CARE: ICD-10-CM

## 2024-05-08 DIAGNOSIS — I50.9 CONGESTIVE HEART FAILURE, UNSPECIFIED HF CHRONICITY, UNSPECIFIED HEART FAILURE TYPE: Primary | ICD-10-CM

## 2024-05-08 DIAGNOSIS — I10 HYPERTENSION, ESSENTIAL: ICD-10-CM

## 2024-05-08 DIAGNOSIS — R60.0 BILATERAL LOWER EXTREMITY EDEMA: ICD-10-CM

## 2024-05-08 DIAGNOSIS — R53.1 WEAKNESS: ICD-10-CM

## 2024-05-08 DIAGNOSIS — F02.C0 SEVERE ALZHEIMER'S DEMENTIA, UNSPECIFIED TIMING OF DEMENTIA ONSET, UNSPECIFIED WHETHER BEHAVIORAL, PSYCHOTIC, OR MOOD DISTURBANCE OR ANXIETY: Primary | ICD-10-CM

## 2024-05-08 LAB
ALBUMIN SERPL BCP-MCNC: 3.4 G/DL (ref 3.5–5.2)
ALP SERPL-CCNC: 58 U/L (ref 55–135)
ALT SERPL W/O P-5'-P-CCNC: 9 U/L (ref 10–44)
ANION GAP SERPL CALC-SCNC: 12 MMOL/L (ref 8–16)
AST SERPL-CCNC: 21 U/L (ref 10–40)
BASOPHILS # BLD AUTO: 0.03 K/UL (ref 0–0.2)
BASOPHILS NFR BLD: 0.6 % (ref 0–1.9)
BILIRUB SERPL-MCNC: 0.6 MG/DL (ref 0.1–1)
BNP SERPL-MCNC: 95 PG/ML (ref 0–99)
BUN SERPL-MCNC: 26 MG/DL (ref 10–30)
CALCIUM SERPL-MCNC: 9.7 MG/DL (ref 8.7–10.5)
CHLORIDE SERPL-SCNC: 95 MMOL/L (ref 95–110)
CO2 SERPL-SCNC: 29 MMOL/L (ref 23–29)
CREAT SERPL-MCNC: 1 MG/DL (ref 0.5–1.4)
DIFFERENTIAL METHOD BLD: ABNORMAL
EOSINOPHIL # BLD AUTO: 0 K/UL (ref 0–0.5)
EOSINOPHIL NFR BLD: 0.4 % (ref 0–8)
ERYTHROCYTE [DISTWIDTH] IN BLOOD BY AUTOMATED COUNT: 12.7 % (ref 11.5–14.5)
EST. GFR  (NO RACE VARIABLE): >60 ML/MIN/1.73 M^2
GLUCOSE SERPL-MCNC: 108 MG/DL (ref 70–110)
HCT VFR BLD AUTO: 42.7 % (ref 40–54)
HCV AB SERPL QL IA: NORMAL
HGB BLD-MCNC: 13.8 G/DL (ref 14–18)
HIV 1+2 AB+HIV1 P24 AG SERPL QL IA: NORMAL
IMM GRANULOCYTES # BLD AUTO: 0.05 K/UL (ref 0–0.04)
IMM GRANULOCYTES NFR BLD AUTO: 1 % (ref 0–0.5)
LYMPHOCYTES # BLD AUTO: 0.8 K/UL (ref 1–4.8)
LYMPHOCYTES NFR BLD: 15.4 % (ref 18–48)
MCH RBC QN AUTO: 27.7 PG (ref 27–31)
MCHC RBC AUTO-ENTMCNC: 32.3 G/DL (ref 32–36)
MCV RBC AUTO: 86 FL (ref 82–98)
MONOCYTES # BLD AUTO: 0.5 K/UL (ref 0.3–1)
MONOCYTES NFR BLD: 9 % (ref 4–15)
NEUTROPHILS # BLD AUTO: 3.9 K/UL (ref 1.8–7.7)
NEUTROPHILS NFR BLD: 73.6 % (ref 38–73)
NRBC BLD-RTO: 0 /100 WBC
PLATELET # BLD AUTO: 160 K/UL (ref 150–450)
PLATELET BLD QL SMEAR: ABNORMAL
PMV BLD AUTO: ABNORMAL FL (ref 9.2–12.9)
POTASSIUM SERPL-SCNC: 3.2 MMOL/L (ref 3.5–5.1)
PROT SERPL-MCNC: 8.5 G/DL (ref 6–8.4)
RBC # BLD AUTO: 4.98 M/UL (ref 4.6–6.2)
SODIUM SERPL-SCNC: 136 MMOL/L (ref 136–145)
TROPONIN I SERPL DL<=0.01 NG/ML-MCNC: <0.006 NG/ML (ref 0–0.03)
WBC # BLD AUTO: 5.25 K/UL (ref 3.9–12.7)

## 2024-05-08 PROCEDURE — 1126F AMNT PAIN NOTED NONE PRSNT: CPT | Mod: HCNC,CPTII,GC,S$GLB | Performed by: STUDENT IN AN ORGANIZED HEALTH CARE EDUCATION/TRAINING PROGRAM

## 2024-05-08 PROCEDURE — 84484 ASSAY OF TROPONIN QUANT: CPT | Mod: HCNC | Performed by: EMERGENCY MEDICINE

## 2024-05-08 PROCEDURE — 83880 ASSAY OF NATRIURETIC PEPTIDE: CPT | Mod: HCNC | Performed by: EMERGENCY MEDICINE

## 2024-05-08 PROCEDURE — 80053 COMPREHEN METABOLIC PANEL: CPT | Mod: HCNC | Performed by: EMERGENCY MEDICINE

## 2024-05-08 PROCEDURE — 93000 ELECTROCARDIOGRAM COMPLETE: CPT | Mod: HCNC,S$GLB,, | Performed by: INTERNAL MEDICINE

## 2024-05-08 PROCEDURE — 85025 COMPLETE CBC W/AUTO DIFF WBC: CPT | Mod: HCNC | Performed by: EMERGENCY MEDICINE

## 2024-05-08 PROCEDURE — 99284 EMERGENCY DEPT VISIT MOD MDM: CPT | Mod: 25,HCNC

## 2024-05-08 PROCEDURE — 93010 ELECTROCARDIOGRAM REPORT: CPT | Mod: HCNC,,, | Performed by: INTERNAL MEDICINE

## 2024-05-08 PROCEDURE — 87389 HIV-1 AG W/HIV-1&-2 AB AG IA: CPT | Mod: HCNC | Performed by: PHYSICIAN ASSISTANT

## 2024-05-08 PROCEDURE — 86803 HEPATITIS C AB TEST: CPT | Mod: HCNC | Performed by: PHYSICIAN ASSISTANT

## 2024-05-08 PROCEDURE — 99999 PR PBB SHADOW E&M-EST. PATIENT-LVL IV: CPT | Mod: PBBFAC,HCNC,GC, | Performed by: STUDENT IN AN ORGANIZED HEALTH CARE EDUCATION/TRAINING PROGRAM

## 2024-05-08 PROCEDURE — 99204 OFFICE O/P NEW MOD 45 MIN: CPT | Mod: HCNC,GC,S$GLB, | Performed by: STUDENT IN AN ORGANIZED HEALTH CARE EDUCATION/TRAINING PROGRAM

## 2024-05-08 PROCEDURE — 93005 ELECTROCARDIOGRAM TRACING: CPT | Mod: HCNC

## 2024-05-09 ENCOUNTER — TELEPHONE (OUTPATIENT)
Dept: CARDIOLOGY | Facility: CLINIC | Age: 89
End: 2024-05-09
Payer: MEDICARE

## 2024-05-09 ENCOUNTER — OUTPATIENT CASE MANAGEMENT (OUTPATIENT)
Dept: ADMINISTRATIVE | Facility: OTHER | Age: 89
End: 2024-05-09
Payer: MEDICARE

## 2024-05-09 VITALS
OXYGEN SATURATION: 96 % | WEIGHT: 106.25 LBS | DIASTOLIC BLOOD PRESSURE: 78 MMHG | SYSTOLIC BLOOD PRESSURE: 155 MMHG | HEART RATE: 68 BPM | TEMPERATURE: 98 F | BODY MASS INDEX: 15.74 KG/M2 | HEIGHT: 69 IN | RESPIRATION RATE: 16 BRPM

## 2024-05-09 DIAGNOSIS — I10 HYPERTENSION, ESSENTIAL: Primary | ICD-10-CM

## 2024-05-09 LAB
OHS QRS DURATION: 76 MS
OHS QRS DURATION: 78 MS
OHS QTC CALCULATION: 456 MS
OHS QTC CALCULATION: 534 MS

## 2024-05-09 NOTE — PLAN OF CARE
DAISY was asked to follow-up with patient home hospice.    DAISY contacted Sofia with [facility] [phone].  As per Sofia she did receive an email this am regarding pt hospice.  Sofia [facility] and stated she will follow-up with the family.    [facility]      Brittny Gabriel CD, MSW, LMSW, RSW   Case Management  [facility]  Email: [email or web address]     See History of Present Illness for attending note.

## 2024-05-09 NOTE — PLAN OF CARE
[facility] - Emergency Dept  Discharge Assessment    Primary Care Provider: Suresh Brantley MD     Discharge Assessment (most recent)       BRIEF DISCHARGE ASSESSMENT - 05/08/24 2371          Discharge Planning    Assessment Type Discharge Planning Assessment (P)      Resource/Environmental Concerns environmental (P)      Environment Concerns none (P)      Support Systems Spouse/significant other;Children (P)      Equipment Currently Used at Home other (see comments) (P)      Current Living Arrangements apartment (P)      Patient/Family Anticipates Transition to home;other (see comments) (P)    home hospice    Patient/Family Anticipated Services at Transition other (see comments) (P)    home hospice    DME Needed Upon Discharge  none (P)      Discharge Plan A Home with family (P)      Discharge Plan B Hospice/home (P)         Financial Resource Strain    How hard is it for you to pay for the very basics like food, housing, medical care, and heating? Patient unable to answer (P)         Housing Stability    In the last 12 months, was there a time when you were not able to pay the mortgage or rent on time? Patient unable to answer (P)      At any time in the past 12 months, were you homeless or living in a shelter (including now)? Patient unable to answer (P)         Transportation Needs    Has the lack of transportation kept you from medical appointments, meetings, work or from getting things needed for daily living? Patient unable to answer (P)         Food Insecurity    Within the past 12 months, you worried that your food would run out before you got the money to buy more. Patient unable to answer (P)      Within the past 12 months, the food you bought just didn't last and you didn't have money to get more. Patient unable to answer (P)         Stress    Do you feel stress - tense, restless, nervous, or anxious, or unable to sleep at night because your mind is troubled all the time - these days? Patient unable  to answer (P)         Social Isolation    How often do you feel lonely or isolated from those around you?  Patient unable to answer (P)         Alcohol Use    Q1: How often do you have a drink containing alcohol? Patient unable to answer (P)      Q2: How many drinks containing alcohol do you have on a typical day when you are drinking? Patient unable to answer (P)      Q3: How often do you have six or more drinks on one occasion? Patient unable to answer (P)         Utilities    In the past 12 months has the electric, gas, oil, or water company threatened to shut off services in your home? Patient unable to answer (P)         Health Literacy    How often do you need to have someone help you when you read instructions, pamphlets, or other written material from your doctor or pharmacy? Patient unable to respond (P)                    Cesar Goldman LMSW  Case Management  Emergency Department  [phone]

## 2024-05-09 NOTE — PROGRESS NOTES
Outpatient Care Management   - Care Plan Follow Up    Patient: Kevyn Leach  MRN:  [id number]  Date of Service:  5/9/2024  Completed by:  Mercedes Cooper LMSW  Referral Date: 04/24/2024    Reason for Visit   Patient presents with    Case Closure     5/9/2024       Brief Summary: DAISY confirmed with Sofia from [facility] that patient was admitted to hospice on 5/9/2024. Notified OPCM RN. Case closed.     Complex Care Plan     Care plan was discussed and completed today with input from patient and/or caregiver.    Patient Instructions     No follow-ups on file.

## 2024-05-09 NOTE — PROVIDER PROGRESS NOTES - EMERGENCY DEPT.
Encounter Date: 5/8/2024    ED Physician Progress Notes            [facility]  Department of Hospital Medicine  [facility] [place] [address]  [phone] after hours  [phone] fax    HOSPICE  ORDERS    05/08/2024    Admit to Hospice:  Home Service   Diagnoses: There are no hospital problems to display for this patient.      Hospice Qualifying Diagnoses: advanced dementia , failure to thrive       Patient has a life expectancy < 6 months due to:  Primary Hospice Diagnosis  Comorbid Conditions Contributing to Decline:  CHF, Alzheimer's   Vital Signs: Routine per Hospice Protocol.    Code Status: DNR    Allergies: Review of patient's allergies indicates:  No Known Allergies    Diet: Diet as tolerated   Activities: As tolerated    Goals of Care Treatment Preferences:  DNR      Nursing: Per Hospice Routine.    Colostomy Care:  Empty bag every shift and prn                                             Change and clean site every 48 hours    PEG Care:  Clean site daily.  Monitor skin integrity.    Lora Care: Empty Lora bag Q shift and PRN.  Change Lora every month.    Routine Skin for Bedridden Patients: Apply moisture barrier cream to all skin folds and   wet areas in perineal area daily and after baths and all bowel movements.      Oxygen: 2l nc for comfort prn    Other Miscellaneous Care:Medications:          Medication List        ASK your doctor about these medications      donepeziL 10 MG tablet  Commonly known as: ARICEPT  Take 1 tablet (10 mg total) by mouth every evening.     finasteride 5 mg tablet  Commonly known as: PROSCAR  Take 1 tablet (5 mg total) by mouth once daily.     gabapentin 100 MG capsule  Commonly known as: NEURONTIN  Take 1 capsule (100 mg total) by mouth once daily.     lovastatin 20 MG tablet  Commonly known as: MEVACOR  Take 1 tablet (20 mg total) by mouth once daily.     tamsulosin 0.4 mg Cap  Commonly known as: FLOMAX  Take 1 capsule  (0.4 mg total) by mouth once daily.     triamterene-hydrochlorothiazide 37.5-25 mg 37.5-25 mg per capsule  Commonly known as: DYAZIDE  Take 1 capsule by mouth once daily.                  Future Orders:  Hospice Medical Director may dictate new orders for comfortable care measures & sign death certificate.        _________________________________  Arabella Williamson MD  05/08/2024

## 2024-05-09 NOTE — PLAN OF CARE
05/08/24 1879   Post-Acute Status   Post-Acute Authorization Other   Other Status Community Services   Hospital Resources/Appts/Education Provided Community resources provided   Discharge Delays None known at this time   Discharge Plan   Discharge Plan A Hospice/home   Discharge Plan B Hospice/home         Mina met pt's daughter Ventura Bowman ( [phone]) at bedside. Ventura stated pt's granddaughter Barbara Lim ( [phone]) has pt's POA and pt's Medical POW. Mina spoke to Barbara Lim, as Ventura had  Barbara on speaker . Family requested home hospice. Barbara requested [facility] for home hospice. Mina informed  family that sw will reach out to [facility] and will reach back out to family.     Mina reached out to [facility] ( [phone]) and spoke to the on call nurse Keri. Mina informed Saavedra of the family wishes for home hospice services. Keri stated they do not have an overnight navigator, but she will send a message to the [place] team and someone will reach out to pt's family on  5-9-2024.    Mina spoke to pt's family with the above information. Pt's family requested assistance transporting family home if pt is discharged. Barbara asked SW if we could admit pt until the morning. Mina informed pt that she will need to speak to pt's dr, in reference to discharge and admission.  Ventura understood.        Met with pt's family  to review discharge recommendation of Home Hospice  and is agreeable to plan    Patient/family provided list of facilities in-network with patient's payor plan. Providers that are owned, operated, or affiliated with [facility] are included on the list.     Notified that referral sent to below listed facilities from in-network list based on proximity to home/family support:   1.  2.  3.  4.  5. (can send more than 5)    Patient/family instructed to identify preference.    Preferred Facility: (if more than 1, listed in order of descending preference)  [facility]    If an  additional preferred facility not listed above is identified, additional referral to be sent. If above facilities unable to accept, will send additional referrals to in-network providers.      Cesar Goldman DAISY  Case Management  Emergency Department  [phone]

## 2024-05-09 NOTE — PROVIDER PROGRESS NOTES - EMERGENCY DEPT.
Encounter Date: 5/8/2024    ED Physician Progress Notes        Received sign-out from previous team plan was reassess patient, patient wishes to be on hospice care .  Hospice will evaluate patient in the morning .  I discussed with patient's daughter and patient they are comfortable with discharge back home.  Return precautions discussed.  Awaiting EMS transfer to home.

## 2024-05-09 NOTE — ED PROVIDER NOTES
"Encounter Date: 5/8/2024       History     Chief Complaint   Patient presents with    Referral     Patient presents from cardiology clinic for evaluation of "fast heart rate". Daughter reports 10lb weight loss in two weeks.      This patient is a 89 year Past Medical History:  No date: Amblyopia      Comment:  refractive amblyopia left eye ()  No date: Arthritis  No date: BPH (benign prostatic hypertrophy)  No date: Colon polyp  3/27/2019: Encephalopathy acute  No date: Generalized pain  No date: Hyperlipidemia  No date: Hypertension  No date: URI (upper respiratory infection)  Presenting at the urging of his cardiologist with his daughter at the bedside.  Cardiologist saw him earlier and noted that he appears lethargic, can not take care of himself, as well as other hallmarks of failure to thrive and forward him to the emergency department.  There is a note that he was found to have a heart rate of 180 at the cardiac clinic.  The patient's daughter states that he no longer walks, feeds himself, drinks adequate liquid or takes his medication.  The patient has a history of dementia and can not contribute recent or remote events to this history.        Note from his cardiologist: "She states that he has lost 20 lbs over the past 2-3 weeks and that he does not eat meals or walk around at home. He is being taken care of by his wife who is also around age 89. She states that the patient has stopped taking his medications because he no longer wants to take them. She has tried to get him into a nursing home  before but there have been financial/social barriers related to the selling of some of his properties."      Review of patient's allergies indicates:  No Known Allergies  Past Medical History:   Diagnosis Date    Amblyopia     refractive amblyopia left eye ()    Arthritis     BPH (benign prostatic hypertrophy)     Colon polyp     Encephalopathy acute 3/27/2019    Generalized pain     Hyperlipidemia     " Hypertension     URI (upper respiratory infection)      Past Surgical History:   Procedure Laterality Date    CATARACT EXTRACTION W/  INTRAOCULAR LENS IMPLANT Right n/a    done at [facility] before     CATARACT EXTRACTION W/  INTRAOCULAR LENS IMPLANT Left 2016    OS     EYE SURGERY      PROSTATE SURGERY      TRANSURETHRAL RESECTION OF PROSTATE       Family History   Problem Relation Name Age of Onset    Diabetes Father      Diabetes Paternal Aunt      Cancer Brother      No Known Problems Mother      Heart disease Neg Hx      Amblyopia Neg Hx      Blindness Neg Hx      Cataracts Neg Hx      Glaucoma Neg Hx      Hypertension Neg Hx      Macular degeneration Neg Hx      Retinal detachment Neg Hx      Strabismus Neg Hx      Stroke Neg Hx      Thyroid disease Neg Hx       Social History     Tobacco Use    Smoking status: Former     Current packs/day: 0.00     Types: Cigarettes     Quit date: 1987     Years since quittin.6    Smokeless tobacco: Never   Substance Use Topics    Alcohol use: No    Drug use: No     Review of Systems   Neurological:  Positive for weakness.       Physical Exam     Initial Vitals [24 1714]   BP Pulse Resp Temp SpO2   116/73 75 19 97.3 °F (36.3 °C) 100 %      MAP       --         Physical Exam    Nursing note and vitals reviewed.  Constitutional:   Frail, elderly cachectic male seen in no acute distress   HENT:   Dry mucous membranes   Eyes: Conjunctivae and EOM are normal.   Neck:   Normal range of motion.  Cardiovascular:  Normal rate and regular rhythm.           Pulmonary/Chest: No respiratory distress. He exhibits no tenderness.   Abdominal: He exhibits no distension. There is no abdominal tenderness.   Musculoskeletal:         General: Normal range of motion.      Cervical back: Normal range of motion.     Neurological: No cranial nerve deficit or sensory deficit.   Skin: Skin is dry.         ED Course   Procedures  Labs Reviewed   CBC W/ AUTO  DIFFERENTIAL - Abnormal; Notable for the following components:       Result Value    Hemoglobin 13.8 (*)     Immature Granulocytes 1.0 (*)     Immature Grans (Abs) 0.05 (*)     Lymph # 0.8 (*)     Gran % 73.6 (*)     Lymph % 15.4 (*)     Platelet Estimate Clumped (*)     All other components within normal limits    Narrative:     Release to patient->Immediate   COMPREHENSIVE METABOLIC PANEL - Abnormal; Notable for the following components:    Potassium 3.2 (*)     Total Protein 8.5 (*)     Albumin 3.4 (*)     ALT 9 (*)     All other components within normal limits    Narrative:     Release to patient->Immediate   HIV 1 / 2 ANTIBODY    Narrative:     Release to patient->Immediate   HEPATITIS C ANTIBODY    Narrative:     Release to patient->Immediate   TROPONIN I    Narrative:     Release to patient->Immediate   B-TYPE NATRIURETIC PEPTIDE    Narrative:     Release to patient->Immediate          Imaging Results              X-Ray Chest AP Portable (Final result)  Result time 05/08/24 20:26:15      Final result by Simone Hunt MD (05/08/24 20:26:15)                   Impression:      1. Chronic appearing interstitial findings, correlation with any history of COPD/emphysema.  No large focal consolidation.      Electronically signed by: Simone Hunt MD  Date:    05/08/2024  Time:    20:26               Narrative:    EXAMINATION:  XR CHEST AP PORTABLE    CLINICAL HISTORY:  weakness;    TECHNIQUE:  Single frontal view of the chest was performed.    COMPARISON:  04/07/2024    FINDINGS:  The cardiomediastinal silhouette is not enlarged noting tortuosity of the aorta..  There is no pleural effusion.  The trachea is midline.  The lungs are symmetrically expanded bilaterally with coarse interstitial attenuation bilaterally..  No large focal consolidation seen.  There is no pneumothorax.  The osseous structures are remarkable for degenerative change..                                       Medications - No data to  display  Medical Decision Making  Pt rapidly assessed. VSS. Pt at his reported baseline per daughter. I also spoke with his granddaughter, Anum, who is his POA. They both confirm that he is DNR/DNI. After a discussion of goals of care, they report that they would wish him to be placed on home hospice. Wishes were d/w Hosp medicine and AALIYAH Shah spoke with patient's daughter to confirm appropriate paperwork is uploaded in the chart and to initiate transition to home hospice. Case s/o to oncoming EM phys regarding final disposition and time to DC.     Amount and/or Complexity of Data Reviewed  Labs: ordered.  Radiology: ordered.                                      Clinical Impression:  Final diagnoses:  [R53.1] Weakness  [G30.9, F02.C0] Severe Alzheimer's dementia, unspecified timing of dementia onset, unspecified whether behavioral, psychotic, or mood disturbance or anxiety (Primary)  [Z51.5] Encounter for hospice care          ED Disposition Condition    Discharge Stable          ED Prescriptions    None       Follow-up Information       Follow up With Specialties Details Why Contact Info    Suresh Brantley MD Family Medicine, Sports Medicine Schedule an appointment as soon as possible for a visit   [address]  [place] [address]  [phone]               Geoff Klein MD  05/09/24 6135

## 2024-05-10 LAB
OHS QRS DURATION: 84 MS
OHS QTC CALCULATION: 506 MS

## 2024-05-23 ENCOUNTER — OUTPATIENT CASE MANAGEMENT (OUTPATIENT)
Dept: ADMINISTRATIVE | Facility: OTHER | Age: 89
End: 2024-05-23
Payer: MEDICARE

## 2024-05-23 NOTE — PROGRESS NOTES
Outpatient Care Management  Patient Not Qualified    Patient: Kevyn Leach  MRN:  [id number]  Date of Service:  5/23/2024  Completed by:  Lesia Mckenna RN    Chief Complaint   Patient presents with    OPCM Chart Review     5/23/24    Case Closure     5/23/24       Patient Summary                5/23/24 Patient was admitted to [facility] hospice on 5/9/24. Closing case.

## 2024-06-10 ENCOUNTER — PATIENT MESSAGE (OUTPATIENT)
Dept: INTERNAL MEDICINE | Facility: CLINIC | Age: 89
End: 2024-06-10
Payer: MEDICARE

## 2024-06-25 DIAGNOSIS — Z00.00 ENCOUNTER FOR MEDICARE ANNUAL WELLNESS EXAM: ICD-10-CM

## 2024-10-28 NOTE — TELEPHONE ENCOUNTER
No care due was identified.  [facility] Embedded Care Due Messages. Reference number: [id number].   10/28/2024 2:38:29 PM CDT

## 2024-11-15 RX ORDER — FINASTERIDE 5 MG/1
5 TABLET, FILM COATED ORAL DAILY
Qty: 90 TABLET | Refills: 0 | Status: SHIPPED | OUTPATIENT
Start: 2024-11-15

## 2025-01-14 DIAGNOSIS — Z00.00 ENCOUNTER FOR MEDICARE ANNUAL WELLNESS EXAM: ICD-10-CM
